# Patient Record
Sex: FEMALE | Race: WHITE | NOT HISPANIC OR LATINO | URBAN - METROPOLITAN AREA
[De-identification: names, ages, dates, MRNs, and addresses within clinical notes are randomized per-mention and may not be internally consistent; named-entity substitution may affect disease eponyms.]

---

## 2017-05-19 ENCOUNTER — EMERGENCY (EMERGENCY)
Facility: HOSPITAL | Age: 62
LOS: 0 days | Discharge: NURSING, REHAB OR LONG-TERM CARE | End: 2017-05-19
Admitting: INTERNAL MEDICINE

## 2017-05-31 ENCOUNTER — INPATIENT (INPATIENT)
Facility: HOSPITAL | Age: 62
LOS: 71 days | Discharge: PSYCHIATRIC OR SUBSTANCE USE FACILITY | End: 2017-08-11
Attending: PSYCHIATRY & NEUROLOGY | Admitting: INTERNAL MEDICINE

## 2017-05-31 DIAGNOSIS — F25.9 SCHIZOAFFECTIVE DISORDER, UNSPECIFIED: ICD-10-CM

## 2017-05-31 DIAGNOSIS — Z72.0 TOBACCO USE: ICD-10-CM

## 2017-05-31 DIAGNOSIS — I10 ESSENTIAL (PRIMARY) HYPERTENSION: ICD-10-CM

## 2017-05-31 DIAGNOSIS — N39.0 URINARY TRACT INFECTION, SITE NOT SPECIFIED: ICD-10-CM

## 2017-07-06 DIAGNOSIS — Z79.51 LONG TERM (CURRENT) USE OF INHALED STEROIDS: ICD-10-CM

## 2017-07-06 DIAGNOSIS — Z79.899 OTHER LONG TERM (CURRENT) DRUG THERAPY: ICD-10-CM

## 2017-07-06 DIAGNOSIS — J44.1 CHRONIC OBSTRUCTIVE PULMONARY DISEASE WITH (ACUTE) EXACERBATION: ICD-10-CM

## 2017-07-06 DIAGNOSIS — R53.1 WEAKNESS: ICD-10-CM

## 2017-07-06 DIAGNOSIS — F17.200 NICOTINE DEPENDENCE, UNSPECIFIED, UNCOMPLICATED: ICD-10-CM

## 2017-08-16 DIAGNOSIS — E78.5 HYPERLIPIDEMIA, UNSPECIFIED: ICD-10-CM

## 2017-08-16 DIAGNOSIS — J44.9 CHRONIC OBSTRUCTIVE PULMONARY DISEASE, UNSPECIFIED: ICD-10-CM

## 2017-08-16 DIAGNOSIS — F20.0 PARANOID SCHIZOPHRENIA: ICD-10-CM

## 2017-08-16 DIAGNOSIS — Z91.14 PATIENT'S OTHER NONCOMPLIANCE WITH MEDICATION REGIMEN: ICD-10-CM

## 2017-08-16 DIAGNOSIS — I10 ESSENTIAL (PRIMARY) HYPERTENSION: ICD-10-CM

## 2017-08-16 DIAGNOSIS — Z88.0 ALLERGY STATUS TO PENICILLIN: ICD-10-CM

## 2017-08-16 DIAGNOSIS — F20.9 SCHIZOPHRENIA, UNSPECIFIED: ICD-10-CM

## 2017-08-16 DIAGNOSIS — M81.0 AGE-RELATED OSTEOPOROSIS WITHOUT CURRENT PATHOLOGICAL FRACTURE: ICD-10-CM

## 2017-08-16 DIAGNOSIS — Z78.1 PHYSICAL RESTRAINT STATUS: ICD-10-CM

## 2017-08-16 DIAGNOSIS — Z63.8 OTHER SPECIFIED PROBLEMS RELATED TO PRIMARY SUPPORT GROUP: ICD-10-CM

## 2017-08-16 SDOH — SOCIAL STABILITY - SOCIAL INSECURITY: OTHER SPECIFIED PROBLEMS RELATED TO PRIMARY SUPPORT GROUP: Z63.8

## 2019-10-11 ENCOUNTER — OUTPATIENT (OUTPATIENT)
Dept: OUTPATIENT SERVICES | Facility: HOSPITAL | Age: 64
LOS: 1 days | Discharge: HOME | End: 2019-10-11

## 2019-10-11 DIAGNOSIS — J44.9 CHRONIC OBSTRUCTIVE PULMONARY DISEASE, UNSPECIFIED: ICD-10-CM

## 2020-07-31 ENCOUNTER — INPATIENT (INPATIENT)
Facility: HOSPITAL | Age: 65
LOS: 1 days | Discharge: PSYCHIATRIC OR SUBSTANCE USE FACILITY | End: 2020-08-02
Attending: HOSPITALIST | Admitting: HOSPITALIST
Payer: MEDICARE

## 2020-07-31 VITALS
DIASTOLIC BLOOD PRESSURE: 62 MMHG | RESPIRATION RATE: 18 BRPM | TEMPERATURE: 99 F | OXYGEN SATURATION: 94 % | HEART RATE: 80 BPM | SYSTOLIC BLOOD PRESSURE: 148 MMHG

## 2020-07-31 DIAGNOSIS — F31.9 BIPOLAR DISORDER, UNSPECIFIED: ICD-10-CM

## 2020-07-31 DIAGNOSIS — F03.90 UNSPECIFIED DEMENTIA, UNSPECIFIED SEVERITY, WITHOUT BEHAVIORAL DISTURBANCE, PSYCHOTIC DISTURBANCE, MOOD DISTURBANCE, AND ANXIETY: ICD-10-CM

## 2020-07-31 LAB
ALBUMIN SERPL ELPH-MCNC: 4.4 G/DL — SIGNIFICANT CHANGE UP (ref 3.5–5.2)
ALP SERPL-CCNC: 75 U/L — SIGNIFICANT CHANGE UP (ref 30–115)
ALT FLD-CCNC: 8 U/L — SIGNIFICANT CHANGE UP (ref 0–41)
ANION GAP SERPL CALC-SCNC: 13 MMOL/L — SIGNIFICANT CHANGE UP (ref 7–14)
APAP SERPL-MCNC: <5 UG/ML — LOW (ref 10–30)
APPEARANCE UR: ABNORMAL
AST SERPL-CCNC: 12 U/L — SIGNIFICANT CHANGE UP (ref 0–41)
BACTERIA # UR AUTO: ABNORMAL
BASOPHILS # BLD AUTO: 0.03 K/UL — SIGNIFICANT CHANGE UP (ref 0–0.2)
BASOPHILS NFR BLD AUTO: 0.4 % — SIGNIFICANT CHANGE UP (ref 0–1)
BILIRUB SERPL-MCNC: 0.6 MG/DL — SIGNIFICANT CHANGE UP (ref 0.2–1.2)
BILIRUB UR-MCNC: NEGATIVE — SIGNIFICANT CHANGE UP
BUN SERPL-MCNC: 12 MG/DL — SIGNIFICANT CHANGE UP (ref 10–20)
CALCIUM SERPL-MCNC: 9.7 MG/DL — SIGNIFICANT CHANGE UP (ref 8.5–10.1)
CHLORIDE SERPL-SCNC: 101 MMOL/L — SIGNIFICANT CHANGE UP (ref 98–110)
CO2 SERPL-SCNC: 29 MMOL/L — SIGNIFICANT CHANGE UP (ref 17–32)
COLOR SPEC: YELLOW — SIGNIFICANT CHANGE UP
CREAT SERPL-MCNC: 0.7 MG/DL — SIGNIFICANT CHANGE UP (ref 0.7–1.5)
DIFF PNL FLD: NEGATIVE — SIGNIFICANT CHANGE UP
EOSINOPHIL # BLD AUTO: 0.13 K/UL — SIGNIFICANT CHANGE UP (ref 0–0.7)
EOSINOPHIL NFR BLD AUTO: 1.5 % — SIGNIFICANT CHANGE UP (ref 0–8)
EPI CELLS # UR: >27 /HPF — HIGH (ref 0–5)
ETHANOL SERPL-MCNC: <10 MG/DL — SIGNIFICANT CHANGE UP
GLUCOSE SERPL-MCNC: 139 MG/DL — HIGH (ref 70–99)
GLUCOSE UR QL: NEGATIVE — SIGNIFICANT CHANGE UP
HCT VFR BLD CALC: 45 % — SIGNIFICANT CHANGE UP (ref 37–47)
HGB BLD-MCNC: 14.2 G/DL — SIGNIFICANT CHANGE UP (ref 12–16)
HYALINE CASTS # UR AUTO: 6 /LPF — SIGNIFICANT CHANGE UP (ref 0–7)
IMM GRANULOCYTES NFR BLD AUTO: 0.4 % — HIGH (ref 0.1–0.3)
KETONES UR-MCNC: ABNORMAL
LEUKOCYTE ESTERASE UR-ACNC: ABNORMAL
LYMPHOCYTES # BLD AUTO: 2.2 K/UL — SIGNIFICANT CHANGE UP (ref 1.2–3.4)
LYMPHOCYTES # BLD AUTO: 25.9 % — SIGNIFICANT CHANGE UP (ref 20.5–51.1)
MCHC RBC-ENTMCNC: 28.1 PG — SIGNIFICANT CHANGE UP (ref 27–31)
MCHC RBC-ENTMCNC: 31.6 G/DL — LOW (ref 32–37)
MCV RBC AUTO: 88.9 FL — SIGNIFICANT CHANGE UP (ref 81–99)
MONOCYTES # BLD AUTO: 0.57 K/UL — SIGNIFICANT CHANGE UP (ref 0.1–0.6)
MONOCYTES NFR BLD AUTO: 6.7 % — SIGNIFICANT CHANGE UP (ref 1.7–9.3)
NEUTROPHILS # BLD AUTO: 5.54 K/UL — SIGNIFICANT CHANGE UP (ref 1.4–6.5)
NEUTROPHILS NFR BLD AUTO: 65.1 % — SIGNIFICANT CHANGE UP (ref 42.2–75.2)
NITRITE UR-MCNC: NEGATIVE — SIGNIFICANT CHANGE UP
NRBC # BLD: 0 /100 WBCS — SIGNIFICANT CHANGE UP (ref 0–0)
PH UR: 6 — SIGNIFICANT CHANGE UP (ref 5–8)
PLATELET # BLD AUTO: 176 K/UL — SIGNIFICANT CHANGE UP (ref 130–400)
POTASSIUM SERPL-MCNC: 4.3 MMOL/L — SIGNIFICANT CHANGE UP (ref 3.5–5)
POTASSIUM SERPL-SCNC: 4.3 MMOL/L — SIGNIFICANT CHANGE UP (ref 3.5–5)
PROT SERPL-MCNC: 6.4 G/DL — SIGNIFICANT CHANGE UP (ref 6–8)
PROT UR-MCNC: ABNORMAL
RBC # BLD: 5.06 M/UL — SIGNIFICANT CHANGE UP (ref 4.2–5.4)
RBC # FLD: 13.9 % — SIGNIFICANT CHANGE UP (ref 11.5–14.5)
RBC CASTS # UR COMP ASSIST: 3 /HPF — SIGNIFICANT CHANGE UP (ref 0–4)
SALICYLATES SERPL-MCNC: <0.3 MG/DL — LOW (ref 4–30)
SARS-COV-2 RNA SPEC QL NAA+PROBE: SIGNIFICANT CHANGE UP
SODIUM SERPL-SCNC: 143 MMOL/L — SIGNIFICANT CHANGE UP (ref 135–146)
SP GR SPEC: 1.02 — SIGNIFICANT CHANGE UP (ref 1.01–1.02)
UROBILINOGEN FLD QL: SIGNIFICANT CHANGE UP
WBC # BLD: 8.5 K/UL — SIGNIFICANT CHANGE UP (ref 4.8–10.8)
WBC # FLD AUTO: 8.5 K/UL — SIGNIFICANT CHANGE UP (ref 4.8–10.8)
WBC UR QL: 238 /HPF — HIGH (ref 0–5)

## 2020-07-31 PROCEDURE — 99285 EMERGENCY DEPT VISIT HI MDM: CPT | Mod: CS

## 2020-07-31 PROCEDURE — 90792 PSYCH DIAG EVAL W/MED SRVCS: CPT

## 2020-07-31 RX ORDER — MIDAZOLAM HYDROCHLORIDE 1 MG/ML
4 INJECTION, SOLUTION INTRAMUSCULAR; INTRAVENOUS ONCE
Refills: 0 | Status: DISCONTINUED | OUTPATIENT
Start: 2020-07-31 | End: 2020-07-31

## 2020-07-31 RX ORDER — HALOPERIDOL DECANOATE 100 MG/ML
2 INJECTION INTRAMUSCULAR ONCE
Refills: 0 | Status: COMPLETED | OUTPATIENT
Start: 2020-07-31 | End: 2020-07-31

## 2020-07-31 RX ORDER — CEFTRIAXONE 500 MG/1
1000 INJECTION, POWDER, FOR SOLUTION INTRAMUSCULAR; INTRAVENOUS ONCE
Refills: 0 | Status: COMPLETED | OUTPATIENT
Start: 2020-07-31 | End: 2020-07-31

## 2020-07-31 RX ORDER — HALOPERIDOL DECANOATE 100 MG/ML
3 INJECTION INTRAMUSCULAR ONCE
Refills: 0 | Status: COMPLETED | OUTPATIENT
Start: 2020-07-31 | End: 2020-07-31

## 2020-07-31 RX ORDER — CEFTRIAXONE 500 MG/1
1000 INJECTION, POWDER, FOR SOLUTION INTRAMUSCULAR; INTRAVENOUS ONCE
Refills: 0 | Status: DISCONTINUED | OUTPATIENT
Start: 2020-07-31 | End: 2020-07-31

## 2020-07-31 RX ORDER — MIDAZOLAM HYDROCHLORIDE 1 MG/ML
5 INJECTION, SOLUTION INTRAMUSCULAR; INTRAVENOUS ONCE
Refills: 0 | Status: DISCONTINUED | OUTPATIENT
Start: 2020-07-31 | End: 2020-07-31

## 2020-07-31 RX ADMIN — HALOPERIDOL DECANOATE 2 MILLIGRAM(S): 100 INJECTION INTRAMUSCULAR at 16:59

## 2020-07-31 RX ADMIN — Medication 1 MILLIGRAM(S): at 16:59

## 2020-07-31 RX ADMIN — MIDAZOLAM HYDROCHLORIDE 4 MILLIGRAM(S): 1 INJECTION, SOLUTION INTRAMUSCULAR; INTRAVENOUS at 20:43

## 2020-07-31 RX ADMIN — HALOPERIDOL DECANOATE 3 MILLIGRAM(S): 100 INJECTION INTRAMUSCULAR at 18:25

## 2020-07-31 RX ADMIN — CEFTRIAXONE 1000 MILLIGRAM(S): 500 INJECTION, POWDER, FOR SOLUTION INTRAMUSCULAR; INTRAVENOUS at 22:52

## 2020-07-31 NOTE — ED PROVIDER NOTE - PROGRESS NOTE DETAILS
Assess by Psych attending. Recommends giving patient Haldol 2 and ativan 1 for sedation. Will need to sedate patient for IV and blood work. -DC Attending Note:   64 yo F PMH dementia and schizophrenia, came from NH for erratic behavior for psych eval. On exam: Pt told resident that she thought we were all members of the [employer], demonstrating disorganized behavior. Psych consulted stat. Was able to be eval pt prior to sedating her. Pt now still refusing labs but will provide sedation with Ativan and Haldol and reassess. Patient will be admitted to IPP if cleared medically; persistently agitated despite sedative medications. Was refusing blood work, urine throughout stay. Pending urinalysis and dispo. Signed out to Dr. Williamson. [place]

## 2020-07-31 NOTE — ED BEHAVIORAL HEALTH ASSESSMENT NOTE - SUMMARY
65 year old female with history of bipolar sent from nursing home due to decompensated psychiatric symptoms, secondary to medication non-compliance. Notable disorganized behavior and underlying psychosis impairing patient's judgment and behavior. Although dementia predisposed patient to become agitated but current presentation is mostly related to decompensated symptoms secondary to medication non-compliance.   Once medically cleared, r/o delirium Patient is in need of inpatient psych admission for stabilization

## 2020-07-31 NOTE — CHART NOTE - NSCHARTNOTEFT_GEN_A_CORE
Pt has home medication of PO Invega XR 6mg BID. This is a non-formulary medication and will require a non-formulary form when the pt is on the inpatient unit.
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
## 2020-07-31 NOTE — ED BEHAVIORAL HEALTH ASSESSMENT NOTE - DESCRIPTION
See above. none known  from her  more than 30 years ago. She has 2 daughters. Patient has a court appointed legal guardian.

## 2020-07-31 NOTE — ED ADULT NURSE NOTE - NSFALLRSKASSESSTYPE_ED_ALL_ED
Procedures Ordered This Visit    CBC auto differential           Comprehensive metabolic panel           Ferritin           Iron and TIBC           Lipid panel           Magnesium           TSH           Vitamin B1           Vitamin B12           Vitamin D               Initial (On Arrival)

## 2020-07-31 NOTE — ED BEHAVIORAL HEALTH ASSESSMENT NOTE - OTHER PAST PSYCHIATRIC HISTORY (INCLUDE DETAILS REGARDING ONSET, COURSE OF ILLNESS, INPATIENT/OUTPATIENT TREATMENT)
Patient has psychiatric history diagnosed with Bipolar since age 24, has been in an out of the hospital. Was at [facility] for almost 3 years, until November of 2020. REcently discharged from [facility] and currently livign at [facility].

## 2020-07-31 NOTE — ED PROVIDER NOTE - OBJECTIVE STATEMENT
Patient is a 66 yo F w/ hx of paranoid schizophrenia, COPD, HTN, anemia, (possible hx of DVT s/p IVC filter- unsure if on AC per daughter) BIBEMS from [facility] for AMS. Per SNF staff, patient was more somnolent than usual today but otherwise was at baseline health. Patient has been noncompliant with all meds x few weeks including IM and PO haldol. Staff denies cough, vomiting, diarrhea. Patient denies all symptoms bedside but is unreliable. Patient refusing to answer questions; refuses blood work, is agitated. Talks of her 17 year old boyfriend; believes the system is trying to kill her and overtake her.

## 2020-07-31 NOTE — ED ADULT NURSE NOTE - CHIEF COMPLAINT QUOTE
Pt sent in for psych evaluation. Denies suicidal/homicidal ideations. Pt has been refusing all medications

## 2020-07-31 NOTE — ED BEHAVIORAL HEALTH ASSESSMENT NOTE - CURRENT MEDICATION
On haloperidol 1mg po three times a day. Haldol 50mg IM was due on 7/15/20, but patient refused. Patient also on Invega 6mg XR q12hrs, she has been refusing.

## 2020-07-31 NOTE — ED PROVIDER NOTE - CARE PLAN
Principal Discharge DX:	Altered mental status  Secondary Diagnosis:	Agitation  Secondary Diagnosis:	UTI (urinary tract infection)

## 2020-07-31 NOTE — ED PROVIDER NOTE - PHYSICAL EXAMINATION
CONSTITUTIONAL: Well-developed; well-nourished; in no acute distress.   SKIN: warm, dry.  HEAD: Normocephalic; atraumatic.  EYES: PERRL, EOMI, no conjunctival erythema  ENT: No nasal discharge; airway clear.  NECK: Supple; non tender.  CARD: S1, S2 normal; no murmurs, gallops, or rubs. Regular rate and rhythm.   RESP: No wheezes, rales or rhonchi.  ABD: soft ntnd.  EXT: Normal ROM.  No clubbing, cyanosis or edema.   NEURO: Alert, oriented x 4, grossly unremarkable.  PSYCH: Uncooperative; agitated. Punching/kicking. Patient appears disheveled.
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
## 2020-08-01 VITALS — RESPIRATION RATE: 18 BRPM | SYSTOLIC BLOOD PRESSURE: 124 MMHG | HEART RATE: 75 BPM | DIASTOLIC BLOOD PRESSURE: 56 MMHG

## 2020-08-01 DIAGNOSIS — F31.9 BIPOLAR DISORDER, UNSPECIFIED: ICD-10-CM

## 2020-08-01 DIAGNOSIS — F03.90 UNSPECIFIED DEMENTIA, UNSPECIFIED SEVERITY, WITHOUT BEHAVIORAL DISTURBANCE, PSYCHOTIC DISTURBANCE, MOOD DISTURBANCE, AND ANXIETY: ICD-10-CM

## 2020-08-01 LAB
AMPHET UR-MCNC: NEGATIVE — SIGNIFICANT CHANGE UP
BARBITURATES UR SCN-MCNC: NEGATIVE — SIGNIFICANT CHANGE UP
BENZODIAZ UR-MCNC: POSITIVE
COCAINE METAB.OTHER UR-MCNC: NEGATIVE — SIGNIFICANT CHANGE UP
DRUG SCREEN 1, URINE RESULT: SIGNIFICANT CHANGE UP
METHADONE UR-MCNC: NEGATIVE — SIGNIFICANT CHANGE UP
OPIATES UR-MCNC: NEGATIVE — SIGNIFICANT CHANGE UP
PCP UR-MCNC: NEGATIVE — SIGNIFICANT CHANGE UP
PROPOXYPHENE QUALITATIVE URINE RESULT: NEGATIVE — SIGNIFICANT CHANGE UP
THC UR QL: NEGATIVE — SIGNIFICANT CHANGE UP

## 2020-08-01 PROCEDURE — 99222 1ST HOSP IP/OBS MODERATE 55: CPT | Mod: AI

## 2020-08-01 PROCEDURE — 99239 HOSP IP/OBS DSCHRG MGMT >30: CPT | Mod: 25

## 2020-08-01 RX ORDER — ENOXAPARIN SODIUM 100 MG/ML
40 INJECTION SUBCUTANEOUS AT BEDTIME
Refills: 0 | Status: DISCONTINUED | OUTPATIENT
Start: 2020-08-01 | End: 2020-08-02

## 2020-08-01 RX ORDER — HALOPERIDOL DECANOATE 100 MG/ML
2 INJECTION INTRAMUSCULAR
Refills: 0 | Status: DISCONTINUED | OUTPATIENT
Start: 2020-08-01 | End: 2020-08-01

## 2020-08-01 RX ORDER — CEFPODOXIME PROXETIL 100 MG
1 TABLET ORAL
Qty: 6 | Refills: 0
Start: 2020-08-01 | End: 2020-08-03

## 2020-08-01 RX ORDER — CEFTRIAXONE 500 MG/1
1000 INJECTION, POWDER, FOR SOLUTION INTRAMUSCULAR; INTRAVENOUS EVERY 24 HOURS
Refills: 0 | Status: DISCONTINUED | OUTPATIENT
Start: 2020-08-01 | End: 2020-08-02

## 2020-08-01 RX ORDER — PALIPERIDONE 1.5 MG/1
6 TABLET, EXTENDED RELEASE ORAL
Refills: 0 | Status: DISCONTINUED | OUTPATIENT
Start: 2020-08-01 | End: 2020-08-02

## 2020-08-01 RX ORDER — HALOPERIDOL DECANOATE 100 MG/ML
2 INJECTION INTRAMUSCULAR EVERY 8 HOURS
Refills: 0 | Status: DISCONTINUED | OUTPATIENT
Start: 2020-08-01 | End: 2020-08-02
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
## 2020-08-01 NOTE — DISCHARGE NOTE PROVIDER - CARE PROVIDER_API CALL
Inpatient Psychiatric Facility,   Please follow up with psychiatrist and PCP at inpatient psychiatric facility  Phone: (   )    -  Fax: (   )    -  Follow Up Time: 1-3 days

## 2020-08-01 NOTE — H&P ADULT - NSHPPHYSICALEXAM_GEN_ALL_CORE
pt is very sleepy, not answering my questions, couldn't do full exam  CONSTITUTIONAL: No acute distress, well-developed,  HEAD:   EYES:   ENT:   PULMONARY: Clear to auscultation bilaterally; no wheezes, rales, or rhonchi  CARDIOVASCULAR: Regular rate and rhythm; no murmurs, rubs, or gallops  GASTROINTESTINAL:  non-distended;   MUSCULOSKELETAL:   NEUROLOGY: very sleepy, non-focal  SKIN:

## 2020-08-01 NOTE — PROGRESS NOTE BEHAVIORAL HEALTH - RISK ASSESSMENT
patient at risk for acute psychosis and inability to participate in safety planning. This is mitigated by housing stability, lack of suicidality, suicide attempts and location in controlled environment.

## 2020-08-01 NOTE — PROGRESS NOTE BEHAVIORAL HEALTH - NSBHCHARTREVIEWVS_PSY_A_CORE FT
Vital Signs Last 24 Hrs  T(C): --  T(F): --  HR: 75 (01 Aug 2020 06:23) (75 - 75)  BP: 124/56 (01 Aug 2020 06:23) (124/56 - 124/56)  BP(mean): --  RR: 18 (01 Aug 2020 06:23) (18 - 18)  SpO2: --

## 2020-08-01 NOTE — DISCHARGE NOTE NURSING/CASE MANAGEMENT/SOCIAL WORK - PATIENT PORTAL LINK FT
You can access the FollowMyHealth Patient Portal offered by [facility] by registering at the following website: [email or web address]. By joining [facility]’s FollowMyHealth portal, you will also be able to view your health information using other applications (apps) compatible with our system.

## 2020-08-01 NOTE — DISCHARGE NOTE PROVIDER - HOSPITAL COURSE
64 yo F w/ hx of schizophrenia, COPD, HTN, GERD, questionable DVT with IVC was brought from NH due to alerted mental status.    Per SNF staff, today the patient was noted to be somnolent with abnormal behavioral according , and she has been refusing VS checking meds and blood work, pt is sleeping and refusing to answer questions. Pt was treated few weeks ago for COVID.        Per ED, refuses blood draw and VS, was sedated with Ativan and Haldol. she said that she believes the system is trying to kill her and overtake her, she also said to ED team they are all members of the [facility]. Labs showed +ve UA, normal WBC, normal VS, psych consulted, assessed as decompensated psychiatric symptoms, secondary to medication non-compliance, asked to have medical clearance and treatment of UTI. Pt is refusing tests, IV placement, however  is medically stable, can treat UTI with PO abx. Pt will be transferred to inpatient psych facility.

## 2020-08-01 NOTE — H&P ADULT - ASSESSMENT
64 yo F w/ hx of schizophrenia, COPD, HTN, GERD, questionable DVT with IVC was brought from NH due to alerted mental status, agitation, delusions and hallucinations       #schizophrenia with decompensated psychiatric symptoms 2/2 to medications non compliance and presumed UTI  - Needs Inpt psych admission once treated for presumed UTI  - Rocephin, f/u Ucx  - f/u with psych    #GERD  - PPI    #copd:  - stable on RA, not on any other meds    #HTN  - BP controlled for now, no home meds    #Diet: DASH  #DVT pro: Lovenox  #GI pro:  PPI  #Dispo: from NH, needs inpt pschy  #Code status : full code 64 yo F w/ hx of schizophrenia, COPD, HTN, GERD, questionable DVT with IVC was brought from NH due to alerted mental status, agitation, delusions and hallucinations       #schizophrenia with decompensated psychiatric symptoms 2/2 to medications non compliance and presumed UTI  - Needs Inpt psych admission once treated for presumed UTI  - Rocephin, f/u Ucx (in chartn pt is allergic to PCN with no detail of kind of reaction, no contact to reach to family to confirm, she got Rocephin in ED with no reaction, c/w Rocephin for now)  - f/u with psych  - Invega 6mg BID  - Haldol PRN for agitation  - Check EKG for QTc    #GERD  - PPI    #copd:  - stable on RA, not on any other meds    #HTN  - BP controlled for now, no home meds    #Diet: DASH  #DVT pro: Lovenox  #GI pro:  PPI  #Dispo: from NH, needs inpt pschy  #Code status : full code 64 yo F w/ hx of schizophrenia, COPD, HTN, GERD, questionable DVT with IVC was brought from NH due to alerted mental status, agitation, delusions and hallucinations       #schizophrenia with decompensated psychiatric symptoms 2/2 to medications non compliance and presumed UTI  - Needs Inpt psych admission once treated for presumed UTI  - Rocephin, f/u Ucx (in chartn pt is allergic to PCN with no detail of kind of reaction, no contact to reach to family to confirm, she got Rocephin in ED with no reaction, c/w Rocephin for now)  - f/u with psych  - Invega 6mg BID  - Haldol or Ativan inkection PRN for agitation  - Check EKG for QTc    #GERD  - PPI    #copd:  - stable on RA, not on any other meds    #HTN  - BP controlled for now, no home meds    #Diet: DASH  #DVT pro: Lovenox  #GI pro:  PPI  #Dispo: from NH, needs inpt pschy  #Code status : full code 64 yo F w/ hx of schizophrenia, COPD, HTN, GERD, questionable DVT with IVC was brought from NH due to alerted mental status, agitation, delusions and hallucinations     # ? UTI  U/A is lg leuks, but few bact  pt denies symptoms  abx: vantin 200mg po q12 x3 days and stop (PCN all, but tolerating rocpehin)  no need to wait on cultures - afeb, no WBC, fully awake and alert    # schizophrenia with decompensated psychiatric symptoms 2/2 to medications non compliance   pt refused physical exam by me  Needs Inpt psych admission once treated for presumed UTI  f/u with psych  Invega 6mg BID  Haldol 2mg IM q8 PRN for agitation    # GERD  PPI    # copd:  stable on RA, not on any other meds    # HTN  BP controlled for now, no home meds    # DVT pro: Lovenox    # GI pro:  PPI    # Code status : full code    # Dispo: medically stable for d/c; short course abx for u/a; f/u w/ psych for IPP admit today

## 2020-08-01 NOTE — H&P ADULT - NSHPLABSRESULTS_GEN_ALL_CORE
LABS:                        14.2   8.50  )-----------( 176      ( 31 Jul 2020 18:59 )             45.0     31 Jul 2020 18:59    143    |  101    |  12     ----------------------------<  139    4.3     |  29     |  0.7      Ca    9.7        31 Jul 2020 18:59    TPro  6.4    /  Alb  4.4    /  TBili  0.6    /  DBili  x      /  AST  12     /  ALT  8      /  AlkPhos  75     31 Jul 2020 18:59

## 2020-08-01 NOTE — H&P ADULT - HISTORY OF PRESENT ILLNESS
64 yo F w/ hx of schizophrenia, COPD, HTN, GERD, questionable DVT with IVC was brought from NH due to alerted mental status.  Per SNF staff, today the patient was noted to be somnolent with abnormal behavioral according , and she has been refusing VS checking meds and blood work, ,     pt is sleeping and refusing to answer questions   according to NH staff, no fever, chills, cough, SOB, diarrhea, dysuria, frequency, urgency, abd pain,   pt was treated few weeks ago for COVID,     per ED, refuses blood draw and VS, was sedated with Ativan and Haldol.  she said that she believes the system is trying to kill her and overtake her, she also said to ED team they are all members of the [facility].  Labs showed +ve UA, normal WBC, normal VS, psych consulted, assessed as decompensated psychiatric symptoms, secondary to medication non-compliance,  asked to have medical clearance and treatment of UTI,  once treated fot UTI will take her to inpatient psych admission 66 yo F w/ hx of schizophrenia, COPD, HTN, GERD, questionable DVT with IVC was brought from NH due to alerted mental status.  Per SNF staff, today the patient was noted to be somnolent with abnormal behavioral according , and she has been refusing VS checking meds and blood work, ,     pt is sleeping and refusing to answer questions   according to NH staff, no fever, chills, cough, SOB, diarrhea, dysuria, frequency, urgency, abd pain,   pt was treated few weeks ago for COVID,     per ED, refuses blood draw and VS, was sedated with Ativan and Haldol.  she said that she believes the system is trying to kill her and overtake her, she also said to ED team they are all members of the [facility].  Labs showed +ve UA, normal WBC, normal VS, psych consulted, assessed as decompensated psychiatric symptoms, secondary to medication non-compliance,  asked to have medical clearance and treatment of UTI,  once treated fot UTI will take her to inpatient psych admission    Attd: pt says she is fine and does not need an exam; says she is eating well; I saw her walking fine; NH sent her in for abnl behavior and psych said she'll take to IPP; she is on 1:1 sit on floor

## 2020-08-01 NOTE — PROGRESS NOTE BEHAVIORAL HEALTH - SUMMARY
65 year old female with history of bipolar sent from nursing home due to decompensated psychiatric symptoms, secondary to medication non-compliance. Upon initial evaluation patient demonstrated grossly disorganized behavior and underlying psychosis impairing patient's judgment and behavior. Patient admitted for   Upon re-evaluation patient continues to demonstrate grossly disorganized behavior with continued delusions, impaired judgement and insight. Given presentation, condition likely symptoms secondary to continued medication non-compliance. Currently patient of danger to herself as she is unable to care for herself. Patient is in need of inpatient psych admission for stabilization.     Plan:   # Acute aggitation  For acute agitation not responsive to verbal redirection may give haldol 2mg q8 prn with escalation to IM if patient becomes a danger to him/herself/others or property.   - Admission to inpatient psychiatry

## 2020-08-01 NOTE — DISCHARGE NOTE PROVIDER - PROVIDER TOKENS
FREE:[LAST:[Inpatient Psychiatric Facility],PHONE:[(   )    -],FAX:[(   )    -],ADDRESS:[Please follow up with psychiatrist and PCP at inpatient psychiatric facility],FOLLOWUP:[1-3 days]]

## 2020-08-01 NOTE — PROGRESS NOTE BEHAVIORAL HEALTH - NSBHCHARTREVIEWLAB_PSY_A_CORE FT
14.2   8.50  )-----------( 176      ( 31 Jul 2020 18:59 )             45.0     07-31    143  |  101  |  12  ----------------------------<  139<H>  4.3   |  29  |  0.7    Ca    9.7      31 Jul 2020 18:59    TPro  6.4  /  Alb  4.4  /  TBili  0.6  /  DBili  x   /  AST  12  /  ALT  8   /  AlkPhos  75  07-31

## 2020-08-01 NOTE — DISCHARGE NOTE PROVIDER - NSDCCPCAREPLAN_GEN_ALL_CORE_FT
PRINCIPAL DISCHARGE DIAGNOSIS  Diagnosis: Acute exacerbation of chronic schizophrenia  Assessment and Plan of Treatment: You were brought in for evaluation of your change in behavior. Psychiatry was consulted. You were found to have Schizophrenia with decompensated psychiatric symptoms due to medcompliance vs UTI. Your UTI is being treated and now you will be transferred to inpatient psychiatric facility.      SECONDARY DISCHARGE DIAGNOSES  Diagnosis: UTI (urinary tract infection)  Assessment and Plan of Treatment: You have bacteria in your Urine. You have been treated with IV antibiotics. You will be discharged with Vantin 200mg PO twice a day for 3 days. Please follow up with your Primary Care provider within 1 week.

## 2020-08-01 NOTE — DISCHARGE NOTE PROVIDER - NSDCMRMEDTOKEN_GEN_ALL_CORE_FT
Haldol 1 mg oral tablet: 1 tab(s) orally 3 times a day  Haldol Decanoate 50 mg/mL intramuscular solution: 0.5 milliliter(s) intramuscular once a month  Invega 6 mg oral tablet, extended release: 1 tab(s) orally 2 times a day  LORazepam 2 mg/mL injectable solution: 2 milligram(s) injectable every 8 hours, As Needed - for agitation  pantoprazole 40 mg oral delayed release tablet: 1 tab(s) orally once a day

## 2020-08-02 ENCOUNTER — INPATIENT (INPATIENT)
Facility: HOSPITAL | Age: 65
LOS: 122 days | Discharge: PSYCHIATRIC OR SUBSTANCE USE FACILITY | End: 2020-12-03
Attending: PSYCHIATRY & NEUROLOGY | Admitting: PSYCHIATRY & NEUROLOGY
Payer: MEDICARE

## 2020-08-02 VITALS
SYSTOLIC BLOOD PRESSURE: 150 MMHG | HEART RATE: 96 BPM | HEIGHT: 62 IN | WEIGHT: 151.02 LBS | DIASTOLIC BLOOD PRESSURE: 82 MMHG

## 2020-08-02 DIAGNOSIS — F20.9 SCHIZOPHRENIA, UNSPECIFIED: ICD-10-CM

## 2020-08-02 DIAGNOSIS — K21.9 GASTRO-ESOPHAGEAL REFLUX DISEASE WITHOUT ESOPHAGITIS: ICD-10-CM

## 2020-08-02 DIAGNOSIS — J44.9 CHRONIC OBSTRUCTIVE PULMONARY DISEASE, UNSPECIFIED: ICD-10-CM

## 2020-08-02 PROCEDURE — 99231 SBSQ HOSP IP/OBS SF/LOW 25: CPT

## 2020-08-02 RX ORDER — ACETAMINOPHEN 500 MG
650 TABLET ORAL EVERY 4 HOURS
Refills: 0 | Status: DISCONTINUED | OUTPATIENT
Start: 2020-08-02 | End: 2020-09-01

## 2020-08-02 RX ORDER — PANTOPRAZOLE SODIUM 20 MG/1
40 TABLET, DELAYED RELEASE ORAL
Refills: 0 | Status: DISCONTINUED | OUTPATIENT
Start: 2020-08-02 | End: 2020-09-01

## 2020-08-02 RX ORDER — HYDROXYZINE HCL 10 MG
25 TABLET ORAL EVERY 12 HOURS
Refills: 0 | Status: DISCONTINUED | OUTPATIENT
Start: 2020-08-02 | End: 2020-08-18

## 2020-08-02 RX ORDER — HALOPERIDOL DECANOATE 100 MG/ML
5 INJECTION INTRAMUSCULAR EVERY 12 HOURS
Refills: 0 | Status: DISCONTINUED | OUTPATIENT
Start: 2020-08-02 | End: 2020-08-03

## 2020-08-02 NOTE — PATIENT PROFILE BEHAVIORAL HEALTH - AS SC BRADEN SENSORY
How Severe Is Your Rash?: moderate
Is This A New Presentation, Or A Follow-Up?: Rash
(4) no impairment

## 2020-08-02 NOTE — H&P ADULT - ASSESSMENT
64 yo F w/ hx of schizophrenia, COPD, HTN, GERD, questionable DVT with IVC was brought from NH due to alerted mental status.  Per SNF staff, today the patient was noted to be somnolent with abnormal behavioral according staff. Pt  here with a c/o abnormal behavior and depression.

## 2020-08-02 NOTE — H&P ADULT - NSHPLABSRESULTS_GEN_ALL_CORE
14.2   8.50  )-----------( 176      ( 2020 18:59 )             45.0       07-31    143  |  101  |  12  ----------------------------<  139<H>  4.3   |  29  |  0.7    Ca    9.7      2020 18:59    TPro  6.4  /  Alb  4.4  /  TBili  0.6  /  DBili  x   /  AST  12  /  ALT  8   /  AlkPhos  75  07-              Urinalysis Basic - ( 2020 21:00 )    Color: Yellow / Appearance: Slightly Turbid / S.024 / pH: x  Gluc: x / Ketone: Small  / Bili: Negative / Urobili: <2 mg/dL   Blood: x / Protein: 30 mg/dL / Nitrite: Negative   Leuk Esterase: Large / RBC: 3 /HPF /  /HPF   Sq Epi: x / Non Sq Epi: >27 /HPF / Bacteria: Few            Lactate Trend            CAPILLARY BLOOD GLUCOSE

## 2020-08-02 NOTE — H&P ADULT - HISTORY OF PRESENT ILLNESS
64 yo F w/ hx of schizophrenia, COPD, HTN, GERD, questionable DVT with IVC was brought from NH due to alerted mental status.  Per SNF staff, today the patient was noted to be somnolent with abnormal behavioral . Pt here with a c/o abnormal behavior and worsening depression.

## 2020-08-02 NOTE — PROGRESS NOTE BEHAVIORAL HEALTH - NSBHFUPINTERVALHXFT_PSY_A_CORE
Chart reviewed, Nursing report discussed, pt was assessed in person. Pt was admitted from medical floor to [facility]. Earlier she was sent from her residence for changed mental status, withdrawn behaviors, not taking medications for over a month. Her last Haldol decanoate was due July 15. She also refused Invega.    Pt was laying in bed refusing interview or communicate. No aggressive behaviors. She ate and stayed in her bed. 1:1 observation initiated.    PRN Haldol 5mg, Benadryl 50mg, Ativan 2mg po for agitation ordered.

## 2020-08-02 NOTE — H&P ADULT - NSHPPHYSICALEXAM_GEN_ALL_CORE
GENERAL:  64y/o Female NAD, resting comfortably.  HEAD:  Atraumatic, Normocephalic  EYES: EOMI, PERRLA, conjunctiva and sclera clear  NECK: Supple, No JVD, no cervical lymphadenopathy, non-tender  CHEST/LUNG: Clear to auscultation bilaterally; No wheeze, rhonchi, or rales  HEART: Regular rate and rhythm; S1&S2  ABDOMEN: Soft, Nontender, Nondistended x 4 quadrants; Bowel sounds present  EXTREMITIES:   Peripheral Pulses Present, No clubbing, no cyanosis, or no edema, no calf tenderness  PSYCH: AAOx3, cooperative, appropriate  NEUROLOGY: WNL  SKIN: WNL

## 2020-08-03 DIAGNOSIS — F17.200 NICOTINE DEPENDENCE, UNSPECIFIED, UNCOMPLICATED: ICD-10-CM

## 2020-08-03 PROCEDURE — 99232 SBSQ HOSP IP/OBS MODERATE 35: CPT

## 2020-08-03 RX ORDER — HALOPERIDOL DECANOATE 100 MG/ML
1 INJECTION INTRAMUSCULAR
Refills: 0 | Status: DISCONTINUED | OUTPATIENT
Start: 2020-08-03 | End: 2020-08-21

## 2020-08-03 RX ORDER — HALOPERIDOL DECANOATE 100 MG/ML
5 INJECTION INTRAMUSCULAR EVERY 12 HOURS
Refills: 0 | Status: DISCONTINUED | OUTPATIENT
Start: 2020-08-03 | End: 2020-08-03

## 2020-08-03 RX ORDER — NICOTINE POLACRILEX 2 MG
2 GUM BUCCAL
Refills: 0 | Status: DISCONTINUED | OUTPATIENT
Start: 2020-08-03 | End: 2020-09-02

## 2020-08-03 RX ORDER — HALOPERIDOL DECANOATE 100 MG/ML
2 INJECTION INTRAMUSCULAR EVERY 8 HOURS
Refills: 0 | Status: DISCONTINUED | OUTPATIENT
Start: 2020-08-03 | End: 2020-08-21

## 2020-08-03 NOTE — CHART NOTE - NSCHARTNOTEFT_GEN_A_CORE
Called in by the Psychiatry resident   She found that Patient was discharged with a UA positive and has not received any antibiotics.   Patient is afebrile, no leukocytosis, UA from /31st were positive for Leukocytes but without nitrites and a few bacteria and many epithelial cells, likely contaminated   Will get another UA and Urine culture   Will hold on giving antibiotics for now

## 2020-08-03 NOTE — PROGRESS NOTE BEHAVIORAL HEALTH - NSBHCHARTREVIEWLAB_PSY_A_CORE FT
Complete Blood Count + Automated Diff (07.31.20 @ 18:59)    WBC Count: 8.50 K/uL    RBC Count: 5.06 M/uL    Hemoglobin: 14.2 g/dL    Hematocrit: 45.0 %    Mean Cell Volume: 88.9 fL    Mean Cell Hemoglobin: 28.1 pg    Mean Cell Hemoglobin Conc: 31.6 g/dL    Red Cell Distrib Width: 13.9     Platelet Count - Automated: 176 K/uL    Auto Neutrophil #: 5.54 K/uL    Auto Lymphocyte #: 2.20 K/uL    Auto Monocyte #: 0.57 K/uL    Auto Eosinophil #: 0.13 K/uL    Auto Basophil #: 0.03 K/uL    Auto Neutrophil %: 65.1    Auto Lymphocyte %: 25.9     Auto Monocyte %: 6.7     Auto Eosinophil %: 1.5     Auto Basophil %: 0.4     Auto Immature Granulocyte %: 0.4     Nucleated RBC: 0 /100 WBCs  Comprehensive Metabolic Panel (07.31.20 @ 18:59)    Sodium, Serum: 143 mmol/L    Potassium, Serum: 4.3 mmol/L    Chloride, Serum: 101 mmol/L    Carbon Dioxide, Serum: 29 mmol/L    Anion Gap, Serum: 13 mmol/L    Blood Urea Nitrogen, Serum: 12 mg/dL    Creatinine, Serum: 0.7 mg/dL    Glucose, Serum: 139 mg/dL    Calcium, Total Serum: 9.7 mg/dL    Protein Total, Serum: 6.4 g/dL    Albumin, Serum: 4.4 g/dL    Bilirubin Total, Serum: 0.6 mg/dL    Alkaline Phosphatase, Serum: 75 U/L    Aspartate Aminotransferase (AST/SGOT): 12 U/L    Alanine Aminotransferase (ALT/SGPT): 8 U/L    eGFR if Non : 90:

## 2020-08-03 NOTE — PROGRESS NOTE BEHAVIORAL HEALTH - SUMMARY
63 year old female , domiciled in nursing home, with history of COPD, DVT, bipolar, and dementia, multiple prior inpatient psych, was at [place] for 2 years, recently discharged from inpatient psych [facility] transferred to [facility], sent to the ER for continuous medication non-compliant for the past months, worsening psychosis and agitation.    Patient presents with decompensated psychiatric symptoms, secondary to medication non-compliance. Disorganized behavior noted and underlying psychosis appears to be impairing patient's judgment and behavior. Although dementia predisposed patient to become agitated but current presentation is mostly related to decompensated symptoms secondary to medication non-compliance. Patient appears to be a danger to herself at this time due to patient unable to care for herself at this time. Patient would benefit form IPP for safety and stabilization.    #Admit 2PC (9:27)    Plan:    # Schizophrenia  -Haldol 1mg BID    #Tobacco Use Disorder  -Nicotine Gum PRN    -Haldol 2mg Q8 PRN for Severe agitation  -Lorazepam 1mg Q12 PRN for agitation/anxiety  -Hydroxyzine 25mg Q12 PRN for anxiety    -Tylenol PRN for pain  -Pantoprazole for GERD  -Maalox PRN for dyspepsia

## 2020-08-03 NOTE — PROGRESS NOTE BEHAVIORAL HEALTH - NSBHFUPINTERVALHXFT_PSY_A_CORE
Pt seen and evaluated, chart reviewed. As per nursing report patient transferred from [facility], refusing all meds and vitals. Patient alert and oriented to person and place. Patient calm and attempted to be cooperative. No agitation or aggression noted. Impaired concentration, had to be redirected multiple times. Patient presents with paranoid delusions. States she was brought her from [facility] because the facility is closing. [facility] chinese placed her there. Patient states she has 2 daughters but one in really a machine. Patient states she sleeps well and appetite is good. When asked about depression she sates " its situational" Would not elaborate but denies depression and this time. Denies anxiety. Denies suicidal/homicidal ideations. Denies A/V hallucinations. When asked about allergies states " I rather not answer". Then later admits to an allergy to PCN. Interview cut short due to patients inability to concentrate, paranoia.     Collaterals: Nurse Mary Meyers [phone], [facility]. Patient currently prescribed haloperidol 1mg po three times a day. Haldol 25mg IM was due on 7/15/20, but patient refused, Invega 6mg q12hrs. [facility] patient has not taken any medication in over a month.    Collaterals: Hannah Frazier [phone] patients daughter. Patients daughter states patient diagnosed with Schizophrenia vs Bipolar. [facility] she has been at [facility] since May 2020 and has not been taking medication in over a month. [facility] patient was supposed to be getting a monthly injection and was not aware until recently that patient has been refusing all meds. [facility] she spent a few years in [place] prior and has been on multiple medications.    Patients transferred from [facility], UTI. Patient was discharged with a UA positive and has not received any antibiotics. Writer contacted PA and reviewed PA note:  Patient is afebrile, no leukocytosis, UA from /31st were positive for Leukocytes but without nitrites and a few bacteria and many epithelial cells, likely contaminated   Will get another UA and Urine culture   Will hold on giving antibiotics for now

## 2020-08-03 NOTE — CONSULT NOTE ADULT - SUBJECTIVE AND OBJECTIVE BOX
GAGANDEEP MORRIS  65y  Female      Patient is a 65y old  Female who presents with a chief complaint of depression (02 Aug 2020 02:03)    HPI:  66 yo F w/ hx of schizophrenia, COPD, HTN, GERD, questionable DVT with IVC was brought from NH due to alerted mental status.  Per SNF staff, today the patient was noted to be somnolent with abnormal behavioral . Pt here with a c/o abnormal behavior and worsening depression. (02 Aug 2020 02:03)    INTERVAL HPI/OVERNIGHT EVENTS:  HEALTH ISSUES - PROBLEM Dx:  Chronic obstructive lung disease: Chronic obstructive lung disease  Chronic GERD: Chronic GERD  Schizophrenia: Schizophrenia    known from previos admssion in past    PAST MEDICAL & SURGICAL HISTORY:  Chronic GERD  Chronic obstructive lung disease  Schizophrenia    FAMILY HISTORY:    acetaminophen   Tablet .. 650 milliGRAM(s) Oral every 4 hours PRN  aluminum hydroxide/magnesium hydroxide/simethicone Suspension 30 milliLiter(s) Oral every 4 hours PRN  haloperidol     Tablet 5 milliGRAM(s) Oral every 12 hours PRN  hydrOXYzine hydrochloride 25 milliGRAM(s) Oral every 12 hours PRN  LORazepam     Tablet 1 milliGRAM(s) Oral every 12 hours PRN  pantoprazole    Tablet 40 milliGRAM(s) Oral before breakfast      REVIEW OF SYSTEMS:  CONSTITUTIONAL: No fever, weight loss, or fatigue  EYES: No eye pain, visual disturbances, or discharge  ENMT:  No difficulty hearing, tinnitus, vertigo; No sinus or throat pain  NECK: No pain or stiffness  BREASTS: No pain, masses, or nipple discharge  RESPIRATORY: No cough, wheezing, chills or hemoptysis; No shortness of breath  CARDIOVASCULAR: No chest pain, palpitations, dizziness, or leg swelling  GASTROINTESTINAL: No abdominal or epigastric pain. No nausea, vomiting, or hematemesis; No diarrhea or constipation. No melena or hematochezia.  GENITOURINARY: No dysuria, frequency, hematuria, or incontinence  NEUROLOGICAL: No headaches, memory loss, loss of strength, numbness, or tremors  SKIN: No itching, burning, rashes, or lesions   LYMPH NODES: No enlarged glands  ENDOCRINE: No heat or cold intolerance; No hair loss  MUSCULOSKELETAL: No joint pain or swelling; No muscle, back, or extremity pain  PSYCHIATRIC: as per hpi and previous psych history  HEME/LYMPH: No easy bruising, or bleeding gums  ALLERY AND IMMUNOLOGIC: No hives or eczema    T(C): --  HR: --  BP: --  RR: --  SpO2: --  Wt(kg): --Vital Signs Last 24 Hrs  T(C): --  T(F): --  HR: --  BP: --  BP(mean): --  RR: --  SpO2: --    PHYSICAL EXAM:  GENERAL: NAD,well-developed deshevled  HEAD:  Atraumatic, Normocephalic  EYES: EOMI, PERRLA, conjunctiva and sclera clear  ENMT:; Moist mucous membranes, poor dentition, No lesions  NECK: Supple, No JVD, Normal thyroid  CHEST/LUNG: Clear bs bilaterally; No rales, rhonchi, wheezing  HEART: Regular rate and rhythm; No murmurs, rubs, or gallops  ABDOMEN: Soft, Nontender, Nondistended; Bowel sounds present  EXTREMITIES:  2+ Peripheral Pulses, No clubbing, cyanosis, or edema  LYMPH: No lymphadenopathy noted  SKIN: No rashes or lesions  Neuro: alert  no focal deficits    Consultant(s) Notes Reviewed:  [x ] YES  [ ] NO  Care Discussed with Consultants/Other Providers [ x] YES  [ ] NO    LABS:              CAPILLARY BLOOD GLUCOSE                RADIOLOGY & ADDITIONAL TESTS:    Imaging Personally Reviewed:  [ ] YES  [ ] NO

## 2020-08-03 NOTE — PROGRESS NOTE BEHAVIORAL HEALTH - NSBHCHARTREVIEWVS_PSY_A_CORE FT
Vital Signs Last 24 Hrs  T(C): --  T(F): --  HR: --  BP: --  BP(mean): --  RR: --  SpO2: -- Vital Signs Last 24 Hrs Patient refused  T(C): --  T(F): --  HR: --  BP: --  BP(mean): --  RR: --  SpO2: --
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
## 2020-08-03 NOTE — CHART NOTE - NSCHARTNOTEFT_GEN_A_CORE
Social Work Admit Note:    Ms. Frazier is 65 years of age female  who was admitted for D/O Thought and behavior as well as medication non-compliance. Pt was BIB EMS from [facility] where she was receiving Short Term Rehab. Pt was brought to IPP from the Medical Floor once UTI was resolved. Even with treatment of UTI pt still presents disorganized and is refusing any and all psych medications at this time. Per report, pt was in [facility] for 2 years. Pt was unable to tell us how she got to [facility]. Pt at this time is s poor historian. NP states she will contact family contact on file for some more information,     When meeting with the pt, she was lying in bed and refused to come to treatment team. Pt was tangential in speech and reported that the world is ending, and stating that the “chinese people put her into [facility]”. Pt reports that [facility] is closing down and that is why she is in the hospital. SW spoke with [facility] Lesion Xin who reports that pt is currently at [facility] on a short term basis and once stable could return. Pt also has Medicaid at this time.     Sexual History – patient identifies as heterosexual. 	    Family relationships and history – Patient’s primary social support is her daughter    Leisure Activity Assessment – Not Identified    Community Supports –[facility].     Employment – patient is not employed     Substance Use Assessment – no use reported    History of suicidality or self- injurious behaviors – none identified      Significant Loses – None identified.      Life Goals – patient verbalized goal of improved mental health     will continue to meet with patient 1:1 and with treatment team daily.  Discharge plan is for continued treatment @ [facility] [place]  Please refer to Social Work Psychosocial for additional information.

## 2020-08-03 NOTE — CONSULT NOTE ADULT - PROBLEM SELECTOR RECOMMENDATION 9
continue present treatment as per psych plan as reviewed  Medically stable with no new changes in treatment  will continue to monitor medical status while being treated on psych  h/o gff in [facility]

## 2020-08-03 NOTE — PROGRESS NOTE BEHAVIORAL HEALTH - NSBHADDHXPSYSOCFT_PSY_A_CORE
Lives at [facility] Paramedian Forehead Flap Text: A decision was made to reconstruct the defect utilizing an interpolation axial flap and a staged reconstruction.  A telfa template was made of the defect.  This telfa template was then used to outline the paramedian forehead pedicle flap.  The donor area for the pedicle flap was then injected with anesthesia.  The flap was excised through the skin and subcutaneous tissue down to the layer of the underlying musculature.  The pedicle flap was carefully excised within this deep plane to maintain its blood supply.  The edges of the donor site were undermined.   The donor site was closed in a primary fashion.  The pedicle was then rotated into position and sutured.  Once the tube was sutured into place, adequate blood supply was confirmed with blanching and refill.  The pedicle was then wrapped with xeroform gauze and dressed appropriately with a telfa and gauze bandage to ensure continued blood supply and protect the attached pedicle.

## 2020-08-04 DIAGNOSIS — J44.9 CHRONIC OBSTRUCTIVE PULMONARY DISEASE, UNSPECIFIED: ICD-10-CM

## 2020-08-04 DIAGNOSIS — Z91.14 PATIENT'S OTHER NONCOMPLIANCE WITH MEDICATION REGIMEN: ICD-10-CM

## 2020-08-04 DIAGNOSIS — Z88.0 ALLERGY STATUS TO PENICILLIN: ICD-10-CM

## 2020-08-04 DIAGNOSIS — N39.0 URINARY TRACT INFECTION, SITE NOT SPECIFIED: ICD-10-CM

## 2020-08-04 DIAGNOSIS — F17.210 NICOTINE DEPENDENCE, CIGARETTES, UNCOMPLICATED: ICD-10-CM

## 2020-08-04 DIAGNOSIS — F03.90 UNSPECIFIED DEMENTIA WITHOUT BEHAVIORAL DISTURBANCE: ICD-10-CM

## 2020-08-04 DIAGNOSIS — I10 ESSENTIAL (PRIMARY) HYPERTENSION: ICD-10-CM

## 2020-08-04 DIAGNOSIS — F20.9 SCHIZOPHRENIA, UNSPECIFIED: ICD-10-CM

## 2020-08-04 DIAGNOSIS — R41.82 ALTERED MENTAL STATUS, UNSPECIFIED: ICD-10-CM

## 2020-08-04 DIAGNOSIS — F31.9 BIPOLAR DISORDER, UNSPECIFIED: ICD-10-CM

## 2020-08-04 DIAGNOSIS — K21.9 GASTRO-ESOPHAGEAL REFLUX DISEASE WITHOUT ESOPHAGITIS: ICD-10-CM

## 2020-08-04 DIAGNOSIS — Z86.718 PERSONAL HISTORY OF OTHER VENOUS THROMBOSIS AND EMBOLISM: ICD-10-CM

## 2020-08-04 DIAGNOSIS — D64.9 ANEMIA, UNSPECIFIED: ICD-10-CM

## 2020-08-04 DIAGNOSIS — F20.0 PARANOID SCHIZOPHRENIA: ICD-10-CM

## 2020-08-04 PROCEDURE — 99231 SBSQ HOSP IP/OBS SF/LOW 25: CPT

## 2020-08-04 NOTE — PROGRESS NOTE BEHAVIORAL HEALTH - NSBHFUPINTERVALHXFT_PSY_A_CORE
Pt seen and evaluated, chart reviewed. As per nursing report patient continues to refuse all meds and vitals, refusing labs. Patient alert and oriented to person and place. Patient calm, no agitation or aggression noted. Impaired concentration, had to be redirected multiple times. Thoughts disorganized, illogical. When asked about medications patient states " I was weened off all medications"  Patient continues to present with paranoid delusions. Preoccupied with Nixon Trgustavo and people conspiring with him against her. Patient states she sleeps well and appetite is good. Denies depression and anxiety. Denies suicidal/homicidal ideations. Denies A/V hallucinations. Pt seen and evaluated, chart reviewed. As per nursing report patient continues to refuse all meds and vitals, refusing labs. Patient alert and oriented to person and place. Patient calm, no agitation or aggression noted. Impaired concentration, had to be redirected multiple times. Thoughts disorganized, illogical. When asked about medications patient states " I was weened off all medications"  Patient continues to present with paranoid delusions. Preoccupied with Nixon Trump and people conspiring with him against her. Patient states she sleeps well and appetite is good. Denies depression and anxiety. Denies suicidal/homicidal ideations. Denies A/V hallucinations.    Writer reviewed note from internal medicine. No new interventions at this time.

## 2020-08-04 NOTE — PROGRESS NOTE ADULT - SUBJECTIVE AND OBJECTIVE BOX
pt stable alert in NAD  no new complaints  pa note reviewd adn ntoed poitive leukocyte neg nitirates    PSY  ^PSY  Handoff  Chronic GERD  Chronic obstructive lung disease  Schizophrenia  Tobacco use disorder  Chronic obstructive lung disease  Chronic GERD  Schizophrenia    HEALTH ISSUES - PROBLEM Dx:  Tobacco use disorder  Chronic obstructive lung disease: Chronic obstructive lung disease  Chronic GERD: Chronic GERD  Schizophrenia: Schizophrenia        PAST MEDICAL & SURGICAL HISTORY:  Chronic GERD  Chronic obstructive lung disease  Schizophrenia    eggs (Other (U))  fish (Other (U))  Navane (Other (U))  penicillins (Other (U))      FAMILY HISTORY:      acetaminophen   Tablet .. 650 milliGRAM(s) Oral every 4 hours PRN  aluminum hydroxide/magnesium hydroxide/simethicone Suspension 30 milliLiter(s) Oral every 4 hours PRN  haloperidol     Tablet 1 milliGRAM(s) Oral two times a day  haloperidol     Tablet 2 milliGRAM(s) Oral every 8 hours PRN  hydrOXYzine hydrochloride 25 milliGRAM(s) Oral every 12 hours PRN  LORazepam     Tablet 1 milliGRAM(s) Oral every 12 hours PRN  nicotine  Polacrilex Gum 2 milliGRAM(s) Oral every 2 hours PRN  pantoprazole    Tablet 40 milliGRAM(s) Oral before breakfast      T(C): --  HR: --  BP: --  RR: --  SpO2: --    PE;  general:  no acute cahnges in nad    Lungs:    Heart:    EXT:    Neuro:  no deficits                          CAPILLARY BLOOD GLUCOSE

## 2020-08-04 NOTE — PROGRESS NOTE BEHAVIORAL HEALTH - NSBHCHARTREVIEWVS_PSY_A_CORE FT
Vital Signs Last 24 Hrs Patient refused  T(C): --  T(F): --  HR: --  BP: --  BP(mean): --  RR: --  SpO2: --

## 2020-08-04 NOTE — PROGRESS NOTE BEHAVIORAL HEALTH - SUMMARY
63 year old female , domiciled in nursing home, with history of COPD, DVT, bipolar, and dementia, multiple prior inpatient psych, was at [place] for 2 years, recently discharged from inpatient psych [facility] transferred to [facility], sent to the ER for continuous medication non-compliant for the past months, worsening psychosis and agitation.    Patient presents with decompensated psychiatric symptoms, secondary to medication non-compliance. Disorganized behavior noted and underlying psychosis appears to be impairing patient's judgment and behavior. Although dementia predisposed patient to become agitated but current presentation is mostly related to decompensated symptoms secondary to medication non-compliance. Patient appears to be a danger to herself at this time due to patient unable to care for herself at this time. Patient would benefit form IPP for safety and stabilization.    #Admit 2PC (9:27)    Plan:    #Schizophrenia  -Haldol 1mg BID    #Tobacco Use Disorder  -Nicotine Gum PRN    -Haldol 2mg Q8 PRN for Severe agitation  -Lorazepam 1mg Q12 PRN for agitation/anxiety  -Hydroxyzine 25mg Q12 PRN for anxiety    -Tylenol PRN for pain  -Pantoprazole for GERD  -Maalox PRN for dyspepsia 63 year old female , domiciled in nursing home, with history of COPD, DVT, bipolar, and dementia, multiple prior inpatient psych, was at [place] for 2 years, recently discharged from inpatient psych [facility] transferred to [facility], sent to the ER for continuous medication non-compliant for the past months, worsening psychosis and agitation.    Patient presents with decompensated psychiatric symptoms, secondary to medication non-compliance. Disorganized behavior noted and underlying psychosis appears to be impairing patient's judgment and behavior. Patient appears to be a danger to herself at this time due to patient unable to care for herself at this time. Patient would benefit form IPP for safety and stabilization.    #Admit 2PC (9:27)    Plan:    #Schizophrenia  -Haldol 1mg BID    #Tobacco Use Disorder  -Nicotine Gum PRN    -Haldol 2mg Q8 PRN for Severe agitation  -Lorazepam 1mg Q12 PRN for agitation/anxiety  -Hydroxyzine 25mg Q12 PRN for anxiety    -Tylenol PRN for pain  -Pantoprazole for GERD  -Maalox PRN for dyspepsia

## 2020-08-05 PROCEDURE — 99231 SBSQ HOSP IP/OBS SF/LOW 25: CPT

## 2020-08-05 RX ORDER — BENZTROPINE MESYLATE 1 MG
1 TABLET ORAL ONCE
Refills: 0 | Status: DISCONTINUED | OUTPATIENT
Start: 2020-08-05 | End: 2020-08-05

## 2020-08-05 RX ORDER — HALOPERIDOL DECANOATE 100 MG/ML
2 INJECTION INTRAMUSCULAR ONCE
Refills: 0 | Status: DISCONTINUED | OUTPATIENT
Start: 2020-08-05 | End: 2020-08-05

## 2020-08-05 RX ADMIN — HALOPERIDOL DECANOATE 1 MILLIGRAM(S): 100 INJECTION INTRAMUSCULAR at 22:44

## 2020-08-05 RX ADMIN — Medication 1 MILLIGRAM(S): at 22:44

## 2020-08-05 NOTE — PROGRESS NOTE BEHAVIORAL HEALTH - NSBHCHARTREVIEWVS_PSY_A_CORE FT
Vital Signs Last 24 Hrs  T(C): --  T(F): --  HR: 70 (05 Aug 2020 06:01) (70 - 70)  BP: 138/76 (05 Aug 2020 06:01) (138/76 - 138/76)  BP(mean): --  RR: 18 (05 Aug 2020 06:01) (18 - 18)  SpO2: --

## 2020-08-05 NOTE — PROGRESS NOTE BEHAVIORAL HEALTH - SUMMARY
63 year old female , domiciled in nursing home, with history of COPD, DVT, bipolar, and dementia, multiple prior inpatient psych, was at [place] for 2 years, recently discharged from inpatient psych [facility] transferred to [facility], sent to the ER for continuous medication non-compliant for the past months, worsening psychosis and agitation.    Patient presents with decompensated psychiatric symptoms, secondary to medication non-compliance. Disorganized behavior noted and underlying psychosis appears to be impairing patient's judgment and behavior. Patient appears to be a danger to herself at this time due to patient unable to care for herself at this time. Patient would benefit form IPP for safety and stabilization.    Thoughts continue to be disorganized, illogical. Patient continues to present with paranoid delusions.  Denies depression and anxiety. Denies suicidal/homicidal ideations. Denies A/V hallucinations.    #Admit 2PC (9:27)    Plan:    #Schizophrenia  -Haldol 1mg BID    #Tobacco Use Disorder  -Nicotine Gum PRN    -Haldol 2mg Q8 PRN for Severe agitation  -Lorazepam 1mg Q12 PRN for agitation/anxiety  -Hydroxyzine 25mg Q12 PRN for anxiety    -Tylenol PRN for pain  -Pantoprazole for GERD  -Maalox PRN for dyspepsia

## 2020-08-05 NOTE — PROGRESS NOTE BEHAVIORAL HEALTH - NSBHFUPINTERVALHXFT_PSY_A_CORE
Pt seen and evaluated, chart reviewed. As per nursing report patient continues to refuse all meds and vitals at times, refusing labs. PCA was able to obtain BP this morning. Patient alert and oriented to person and place. Patient calm, no agitation or aggression noted. Impaired concentration, had to be redirected multiple times. Thoughts continue to be disorganized, illogical. When again asked about medications patient states " Im not on any medication. I don't need medication"  Patient continues to present with paranoid delusions. Patient states she sleeps well and appetite is good. Denies depression and anxiety. Denies suicidal/homicidal ideations. Denies A/V hallucinations. Pt seen and evaluated, chart reviewed. As per nursing report patient continues to refuse all meds and vitals at times, refusing labs. PCA was able to obtain BP this morning. Patient alert and oriented to person and place. Patient calm, no agitation or aggression noted.  Impaired concentration, had to be redirected multiple times. Thoughts continue to be disorganized, illogical. When again asked about medications patient states " Im not on any medication. I don't need medication". States "everything is fine". Patient continues to present with paranoid delusions. Patient states she sleeps well and appetite is good. Denies depression and anxiety. Denies suicidal/homicidal ideations. Denies A/V hallucinations. Patient visualized on the unit engaging with peers and attending groups.

## 2020-08-05 NOTE — CHART NOTE - NSCHARTNOTEFT_GEN_A_CORE
Social Work Note:    Treatment team met with patient to discuss treatment plan, medications and discharge plan.  During the day patient is observed to be visible on unit and active in groups. Patient in good behavioral control but at this time continues to refuse all medication and vital signs. SW has spoken to [facility] who is willing to accept the pt back, once pt is stable and taking medication. While pt is pleasant in nature she does not want to talk and will just tell treatment team “everything is fine” and dismiss us. TOO to be scheduled and conducted to ensure medication compliance. SW will continue to follow.     Mental Status Exam:    Mood – Euthymic    Sleep - Good  Appetite - Good  ADLs - Good  Thought Process – Delusional/Tangential    Observation – [id number].

## 2020-08-06 PROCEDURE — 99231 SBSQ HOSP IP/OBS SF/LOW 25: CPT

## 2020-08-06 RX ORDER — HALOPERIDOL DECANOATE 100 MG/ML
3 INJECTION INTRAMUSCULAR ONCE
Refills: 0 | Status: COMPLETED | OUTPATIENT
Start: 2020-08-06 | End: 2020-08-06

## 2020-08-06 RX ADMIN — HALOPERIDOL DECANOATE 1 MILLIGRAM(S): 100 INJECTION INTRAMUSCULAR at 08:29

## 2020-08-06 RX ADMIN — Medication 1 MILLIGRAM(S): at 23:39

## 2020-08-06 RX ADMIN — HALOPERIDOL DECANOATE 2 MILLIGRAM(S): 100 INJECTION INTRAMUSCULAR at 23:39

## 2020-08-06 NOTE — PROGRESS NOTE BEHAVIORAL HEALTH - SUMMARY
63 year old female , domiciled in nursing home, with history of COPD, DVT, bipolar, and dementia, multiple prior inpatient psych, was at [place] for 2 years, recently discharged from inpatient psych [facility] transferred to [facility], sent to the ER for continuous medication non-compliant for the past months, worsening psychosis and agitation.    Patient presents with decompensated psychiatric symptoms, secondary to medication non-compliance. Disorganized behavior noted and underlying psychosis appears to be impairing patient's judgment and behavior. Patient appears to be a danger to herself at this time due to patient unable to care for herself at this time. Patient would benefit form IPP for safety and stabilization.    Thoughts continue to be disorganized, illogical. Patient continues to present with paranoid delusions.  Denies depression and anxiety. Denies suicidal/homicidal ideations. Denies A/V hallucinations.    #Admit 2PC (9:27)    Plan:    #Schizophrenia  -Haldol 1mg BID    #Tobacco Use Disorder  -Nicotine Gum PRN    -Haldol 2mg Q8 PRN for Severe agitation  -Lorazepam 1mg Q12 PRN for agitation/anxiety  -Hydroxyzine 25mg Q12 PRN for anxiety    -Tylenol PRN for pain  -Pantoprazole for GERD  -Maalox PRN for dyspepsia 63 year old female , domiciled in nursing home, with history of COPD, DVT, bipolar, and dementia, multiple prior inpatient psych, was at [place] for 2 years, recently discharged from inpatient psych [facility] transferred to [facility], sent to the ER for continuous medication non-compliant for the past months, worsening psychosis and agitation.    Patient presents with decompensated psychiatric symptoms, secondary to medication non-compliance. Disorganized behavior noted and underlying psychosis appears to be impairing patient's judgment and behavior. Patient appears to be a danger to herself at this time due to patient unable to care for herself at this time. Patient would benefit form IPP for safety and stabilization.    Thoughts continue to be disorganized, illogical. Patient continues to present with paranoid delusions.  Denies depression and anxiety. Denies suicidal/homicidal ideations. Denies A/V hallucinations.    #Admit 2PC (9:27)    Plan:    #Schizophrenia  -Haldol 1mg BID    #Tobacco Use Disorder  -Nicotine Gum PRN    -Haldol 2mg Q8 PRN for Severe agitation  -Lorazepam 1mg Q12 PRN for agitation/anxiety  -Hydroxyzine 25mg Q12 PRN for anxiety    -Tylenol PRN for pain  -Pantoprazole for GERD  -Maalox PRN for dyspepsia    -cw 1:1 elopement risk

## 2020-08-06 NOTE — PROGRESS NOTE ADULT - SUBJECTIVE AND OBJECTIVE BOX
pt stable alert in NAD  no new complaints    PSY  ^PSY  Handoff  Chronic GERD  Chronic obstructive lung disease  Schizophrenia  Chronic obstructive pulmonary disease, unspecified COPD type  Schizophrenia, unspecified type  Tobacco use disorder  Chronic obstructive lung disease  Chronic GERD  Schizophrenia    HEALTH ISSUES - PROBLEM Dx:  Chronic obstructive pulmonary disease, unspecified COPD type: Chronic obstructive pulmonary disease, unspecified COPD type  Schizophrenia, unspecified type: Schizophrenia, unspecified type  Tobacco use disorder  Chronic obstructive lung disease: Chronic obstructive lung disease  Chronic GERD: Chronic GERD  Schizophrenia: Schizophrenia        PAST MEDICAL & SURGICAL HISTORY:  Chronic GERD  Chronic obstructive lung disease  Schizophrenia    eggs (Other (U))  fish (Other (U))  Navane (Other (U))  penicillins (Other (U))      FAMILY HISTORY:      acetaminophen   Tablet .. 650 milliGRAM(s) Oral every 4 hours PRN  aluminum hydroxide/magnesium hydroxide/simethicone Suspension 30 milliLiter(s) Oral every 4 hours PRN  haloperidol     Tablet 2 milliGRAM(s) Oral every 8 hours PRN  haloperidol     Tablet 1 milliGRAM(s) Oral two times a day  hydrOXYzine hydrochloride 25 milliGRAM(s) Oral every 12 hours PRN  LORazepam     Tablet 1 milliGRAM(s) Oral every 12 hours PRN  nicotine  Polacrilex Gum 2 milliGRAM(s) Oral every 2 hours PRN  pantoprazole    Tablet 40 milliGRAM(s) Oral before breakfast      T(C): --  HR: --  BP: --  RR: --  SpO2: --    PE;  general:  still desheveled  but innad    Lungs:    Heart:    EXT:    Neuro:  no deficits                          CAPILLARY BLOOD GLUCOSE

## 2020-08-06 NOTE — PROGRESS NOTE BEHAVIORAL HEALTH - NSBHFUPINTERVALHXFT_PSY_A_CORE
Pt seen and evaluated, chart reviewed. As per nursing report patient eloped from unit. Placed on 1:1 for safety. Noted episodes of restlessness, anger, cursing at times, inappropriate behavior. Continues to periodically refuse meds and vitals at times, refusing labs. On approach during rounds patient calm, no agitation or aggression noted.  Impaired concentration, had to be redirected multiple times. Thoughts continue to be disorganized, illogical. Patient continues to present with paranoid delusions. Patient states she sleeps well and appetite is good. Denies depression and anxiety. Denies suicidal/homicidal ideations. Denies A/V hallucinations. Patient visualized on the unit engaging with peers and attending groups. Pt seen and evaluated, chart reviewed. As per nursing report patient eloped from unit. Placed on 1:1 for safety. Noted episodes of restlessness, anger, cursing at times, inappropriate behavior. Continues to periodically refuse meds and vitals at times, refusing labs. On approach during rounds patient calm, no agitation or aggression noted.  Impaired concentration, had to be redirected multiple times. Thoughts continue to be disorganized, illogical. Patient continues to present with paranoid delusions. Patient states she sleeps well and appetite is good. Denies depression and anxiety. Denies suicidal/homicidal ideations. Denies A/V hallucinations. Patient visualized on the unit engaging with peers and attending groups.    Writer reviewed note from Internal Medicine. No new interventions at this time. Pt seen and evaluated, chart reviewed. As per nursing report patient eloped from unit. Placed on 1:1 for safety. Noted episodes of restlessness, anger, cursing at times, inappropriate behavior. Continues to periodically refuse meds and vitals at times, refusing labs. On approach during rounds patient calm, no agitation or aggression noted.  Impaired concentration, had to be redirected multiple times. Thoughts continue to be disorganized, illogical. Patient continues to present with paranoid delusions. Patient states she sleeps well and appetite is good. Denies depression and anxiety. Denies suicidal/homicidal ideations. Denies A/V hallucinations. Patient visualized on the unit engaging with peers and attending groups.    Writer reviewed note from Internal Medicine. No new interventions at this time.    Placed call to patients daughter Hannah Frazier [phone]. Updated her on patients care and treatment. Made her aware of elopement incident and patients continued refusal to take meds, refusing labs and vitals. Patients daughter states she is aware of how her mother can be when she is not taking her medication. Wants her mom to get better. Daughter verbalized that a court order was needed for her to take medication before and she is in agreement if that is what is needed. Daughter states she is patients healthcare proxy and makes all medical decisions for her mom.

## 2020-08-07 PROCEDURE — 99231 SBSQ HOSP IP/OBS SF/LOW 25: CPT

## 2020-08-07 RX ADMIN — HALOPERIDOL DECANOATE 1 MILLIGRAM(S): 100 INJECTION INTRAMUSCULAR at 19:43

## 2020-08-07 RX ADMIN — PANTOPRAZOLE SODIUM 40 MILLIGRAM(S): 20 TABLET, DELAYED RELEASE ORAL at 08:00

## 2020-08-07 RX ADMIN — HALOPERIDOL DECANOATE 1 MILLIGRAM(S): 100 INJECTION INTRAMUSCULAR at 08:00

## 2020-08-07 RX ADMIN — HALOPERIDOL DECANOATE 2 MILLIGRAM(S): 100 INJECTION INTRAMUSCULAR at 16:19

## 2020-08-07 RX ADMIN — Medication 25 MILLIGRAM(S): at 00:23

## 2020-08-07 RX ADMIN — Medication 1 MILLIGRAM(S): at 19:38

## 2020-08-07 RX ADMIN — Medication 25 MILLIGRAM(S): at 19:42

## 2020-08-07 NOTE — PROGRESS NOTE BEHAVIORAL HEALTH - NSBHFUPINTERVALHXFT_PSY_A_CORE
Pt seen and evaluated, chart reviewed. As per nursing report patient verbally aggressive toward staff, IM haldol and Ativan given. 1:1 in place for safety. Continues to periodically refuse meds and vitals at times, refusing labs. On approach during rounds patient calm, no agitation or aggression noted. Continues to have impaired concentration, had to be redirected multiple times. Thoughts continue to be disorganized and illogical. Patient continues to present with paranoid delusions. Denies depression and anxiety. Denies suicidal/homicidal ideations. Denies A/V hallucinations. Patient visualized on the unit engaging with peers and attending groups. Pt seen and evaluated, chart reviewed. As per nursing report patient verbally aggressive toward staff, IM haldol and Ativan given. 1:1 in place for safety. Continues to periodically refuse meds and vitals at times, refusing labs. On approach during rounds patient calm, no agitation or aggression noted. Continues to have impaired concentration, had to be redirected multiple times. Thoughts continue to be disorganized and illogical. Patient continues to present with paranoid delusions. Continues to present with poor insight and judgement. Denies depression and anxiety. Denies suicidal/homicidal ideations. Denies A/V hallucinations. Patient visualized on the unit engaging with peers and attending groups.

## 2020-08-07 NOTE — CHART NOTE - NSCHARTNOTEFT_GEN_A_CORE
Social Work Note:    Treatment team met with patient to discuss treatment plan, medications and discharge plan.  During the day patient is observed to be visible on unit but not attending groups and can be verbally aggressive towards staff. Pt is on a 1-1 due to elopment risk as she attempted to leave the unit. Patient  continues to refuse all medication and vital signs. SW has spoken to [facility] who is willing to accept the pt back, once pt is stable and taking medication. While pt is pleasant in nature she does not want to talk and will just tell treatment team “everything is fine” and dismiss us. TOO to be scheduled and conducted to ensure medication compliance. SW will continue to follow.     Mental Status Exam:    Mood – Euthymic    Sleep - Good  Appetite - Good  ADLs - Good  Thought Process – Delusional/Tangential    Observation – [id number].

## 2020-08-07 NOTE — CHART NOTE - NSCHARTNOTEFT_GEN_A_CORE
Chart reviewed. Pt was seen and evaluated with [facility] .  Pt exhibits poor insight into her illness and regarding the need for medication. States "I don't need medication, there is nothing wrong with me."    I agree with treatment team that pt needs to be treated over objection for clinical improvement.

## 2020-08-07 NOTE — PROGRESS NOTE BEHAVIORAL HEALTH - SUMMARY
63 year old female , domiciled in nursing home, with history of COPD, DVT, bipolar, and dementia, multiple prior inpatient psych, was at [place] for 2 years, recently discharged from inpatient psych [facility] transferred to [facility], sent to the ER for continuous medication non-compliant for the past months, worsening psychosis and agitation.    Patient presents with decompensated psychiatric symptoms, secondary to medication non-compliance. Disorganized behavior noted and underlying psychosis appears to be impairing patient's judgment and behavior. Patient appears to be a danger to herself at this time due to patient unable to care for herself at this time. Patient would benefit form IPP for safety and stabilization.    Thoughts continue to be disorganized, illogical. Patient continues to present with paranoid delusions.  Denies depression and anxiety. Denies suicidal/homicidal ideations. Denies A/V hallucinations.    #Admit 2PC (9:27)    Plan:    #Schizophrenia  -Haldol 1mg BID    #Tobacco Use Disorder  -Nicotine Gum PRN    -Haldol 2mg Q8 PRN for Severe agitation  -Lorazepam 1mg Q12 PRN for agitation/anxiety  -Hydroxyzine 25mg Q12 PRN for anxiety    -Tylenol PRN for pain  -Pantoprazole for GERD  -Maalox PRN for dyspepsia    -cw 1:1 elopement risk 63 year old female , domiciled in nursing home, with history of COPD, DVT, bipolar, and dementia, multiple prior inpatient psych, was at [place] for 2 years, recently discharged from inpatient psych [facility] transferred to [facility], sent to the ER for continuous medication non-compliant for the past months, worsening psychosis and agitation.    Patient presents with decompensated psychiatric symptoms, secondary to medication non-compliance. Disorganized behavior noted and underlying psychosis appears to be impairing patient's judgment and behavior. Patient appears to be a danger to herself at this time due to patient unable to care for herself at this time. Patient would benefit form IPP for safety and stabilization.    Thoughts continue to be disorganized, illogical. Patient continues to present with paranoid delusions.  Continues to present with poor insight and judgement.  Denies depression and anxiety. Denies suicidal/homicidal ideations. Denies A/V hallucinations.    #Admit 2PC (9:27)    Plan:    #Schizophrenia  -Haldol 1mg BID    #Tobacco Use Disorder  -Nicotine Gum PRN    -Haldol 2mg Q8 PRN for Severe agitation  -Lorazepam 1mg Q12 PRN for agitation/anxiety  -Hydroxyzine 25mg Q12 PRN for anxiety    -Tylenol PRN for pain  -Pantoprazole for GERD  -Maalox PRN for dyspepsia    -cw 1:1 elopement risk

## 2020-08-08 RX ADMIN — HALOPERIDOL DECANOATE 2 MILLIGRAM(S): 100 INJECTION INTRAMUSCULAR at 23:41

## 2020-08-08 RX ADMIN — HALOPERIDOL DECANOATE 1 MILLIGRAM(S): 100 INJECTION INTRAMUSCULAR at 20:06

## 2020-08-08 RX ADMIN — HALOPERIDOL DECANOATE 1 MILLIGRAM(S): 100 INJECTION INTRAMUSCULAR at 12:36

## 2020-08-08 RX ADMIN — Medication 1 MILLIGRAM(S): at 23:41

## 2020-08-08 RX ADMIN — PANTOPRAZOLE SODIUM 40 MILLIGRAM(S): 20 TABLET, DELAYED RELEASE ORAL at 12:35

## 2020-08-09 RX ADMIN — HALOPERIDOL DECANOATE 1 MILLIGRAM(S): 100 INJECTION INTRAMUSCULAR at 20:57

## 2020-08-09 RX ADMIN — HALOPERIDOL DECANOATE 1 MILLIGRAM(S): 100 INJECTION INTRAMUSCULAR at 08:22

## 2020-08-09 NOTE — PROGRESS NOTE BEHAVIORAL HEALTH - NSBHCHARTREVIEWVS_PSY_A_CORE FT
Vital Signs Last 24 Hrs- Patient refused  T(C): --  T(F): --  HR: --  BP: --  BP(mean): --  RR: --  SpO2: --

## 2020-08-09 NOTE — PROGRESS NOTE BEHAVIORAL HEALTH - NSBHFUPINTERVALHXFT_PSY_A_CORE
Pt seen and evaluated, chart reviewed. discussed with staff 1:1 in place for safety and elopement risk  Continues to periodically refuse meds and vitals at times, refusing labs.  No acute event  overnight  On approach during rounds patient lying in bed   calm, no agitation or aggression noted. Continues to have impaired concentration, had to be redirected multiple times. Thoughts continue to be disorganized and illogical. Patient continues to present with paranoid delusions. Continues to present with poor insight and judgement. Denies depression and anxiety. Denies suicidal/homicidal ideations. Denies A/V hallucinations. Patient visualized on the unit engaging with peers and attending groups.

## 2020-08-09 NOTE — PROGRESS NOTE BEHAVIORAL HEALTH - SUMMARY
63 year old female , domiciled in nursing home, with history of COPD, DVT, bipolar, and dementia, multiple prior inpatient psych, was at [place] for 2 years, recently discharged from inpatient psych [facility] transferred to [facility], sent to the ER for continuous medication non-compliant for the past months, worsening psychosis and agitation.    Patient presents with decompensated psychiatric symptoms, secondary to medication non-compliance. Disorganized behavior noted and underlying psychosis appears to be impairing patient's judgment and behavior. Patient appears to be a danger to herself at this time due to patient unable to care for herself at this time. Patient would benefit form IPP for safety and stabilization.    Thoughts continue to be disorganized, illogical. Patient continues to present with paranoid delusions.  Continues to present with poor insight and judgement.  Denies depression and anxiety. Denies suicidal/homicidal ideations. Denies A/V hallucinations.    #Admit 2PC (9:27)    Plan:    #Schizophrenia  -Haldol 1mg BID    #Tobacco Use Disorder  -Nicotine Gum PRN    -Haldol 2mg Q8 PRN for Severe agitation  -Lorazepam 1mg Q12 PRN for agitation/anxiety  -Hydroxyzine 25mg Q12 PRN for anxiety    -Tylenol PRN for pain  -Pantoprazole for GERD  -Maalox PRN for dyspepsia    -cw 1:1 elopement risk

## 2020-08-10 PROCEDURE — 99231 SBSQ HOSP IP/OBS SF/LOW 25: CPT

## 2020-08-10 RX ADMIN — HALOPERIDOL DECANOATE 1 MILLIGRAM(S): 100 INJECTION INTRAMUSCULAR at 20:04

## 2020-08-10 RX ADMIN — Medication 1 MILLIGRAM(S): at 00:30

## 2020-08-10 RX ADMIN — HALOPERIDOL DECANOATE 1 MILLIGRAM(S): 100 INJECTION INTRAMUSCULAR at 08:09

## 2020-08-10 NOTE — PROGRESS NOTE BEHAVIORAL HEALTH - SUMMARY
63 year old female , domiciled in nursing home, with history of COPD, DVT, bipolar, and dementia, multiple prior inpatient psych, was at [place] for 2 years, recently discharged from inpatient psych [facility] transferred to [facility], sent to the ER for continuous medication non-compliant for the past months, worsening psychosis and agitation.    Patient presents with decompensated psychiatric symptoms, secondary to medication non-compliance. Disorganized behavior noted and underlying psychosis appears to be impairing patient's judgment and behavior. Patient appears to be a danger to herself at this time due to patient unable to care for herself at this time. Patient would benefit form IPP for safety and stabilization.    Thoughts continue to be disorganized, illogical. Patient continues to present with paranoid delusions.  Continues to present with poor insight and judgement.  Denies depression and anxiety. Denies suicidal/homicidal ideations. Denies A/V hallucinations.    #Admit 2PC (9:27)    Plan:    #Schizophrenia  -Haldol 1mg BID    #Tobacco Use Disorder  -Nicotine Gum PRN    -Haldol 2mg Q8 PRN for Severe agitation  -Lorazepam 1mg Q12 PRN for agitation/anxiety  -Hydroxyzine 25mg Q12 PRN for anxiety    -Tylenol PRN for pain  -Pantoprazole for GERD  -Maalox PRN for dyspepsia    -cw 1:1 elopement risk

## 2020-08-10 NOTE — PROGRESS NOTE BEHAVIORAL HEALTH - NSBHFUPINTERVALHXFT_PSY_A_CORE
Pt seen and evaluated in treatment team, chart reviewed. As per nursing report patient continues to be intermittently verbally aggressive toward staff, PRN's required for behavioral control. 1:1 in place for safety. Continues to periodically refuse meds and vitals at times, refusing labs. TOO to be scheduled and conducted to ensure medication compliance. Patient calm and cooperative during team. No agitation or aggression noted. Continues to have impaired concentration, had to be redirected multiple times. Thoughts continue to be disorganized and illogical. Periods of clarity. Patient continues to present with paranoid delusions. Continues to present with poor insight and judgement. Denies depression and anxiety. Denies suicidal/homicidal ideations. Denies A/V hallucinations. Patient visualized on the unit engaging with peers.

## 2020-08-10 NOTE — CHART NOTE - NSCHARTNOTEFT_GEN_A_CORE
Social Work Note:    Treatment team met with patient to discuss treatment plan, medications and discharge plan.  During the day patient is observed to be visible on unit but not attending groups and can be verbally aggressive towards staff. Pt is on a 1-1 due to elopement risk as she attempted to leave the unit. Patient continues to refuse all medication and vital signs. AMBROSE has spoken to [facility] who is willing to accept the pt back, once pt is stable and taking medication. While pt is pleasant in nature she does not want to talk and will just tell treatment team “everything is fine” and dismiss us. TOO to be scheduled and conducted to ensure medication compliance. SW will continue to follow.    Pt continues to attempt to make some reality based statements but is showing difficulty at this time.     AMBROSE spoke to Xin wilkins @ [facility] who stated that she can accept the pt back once she  is compliance with meds and psych stable. SW will submit LTC referral due to NO SKILLED NEED at this time.     Mental Status Exam:    Mood – Euthymic    Sleep - Good  Appetite - Good  ADLs - Good  Thought Process – Delusional/Tangential    Observation – [id number].

## 2020-08-11 PROCEDURE — 99231 SBSQ HOSP IP/OBS SF/LOW 25: CPT

## 2020-08-11 NOTE — PROGRESS NOTE BEHAVIORAL HEALTH - NSBHFUPINTERVALHXFT_PSY_A_CORE
Pt seen and evaluated, chart reviewed. As per nursing report patient continues to be intermittently irritable. 1:1 in place for safety. Continues to periodically refuse meds and vitals at times, refusing labs. TOO to be scheduled and conducted to ensure medication compliance. Patient calm and cooperative this morning. No agitation or aggression noted. Continues to have impaired concentration. Thoughts continue to be disorganized and illogical. Patient continues to present with paranoid delusions. Continues to present with poor insight and judgement. Denies depression and anxiety. Denies suicidal/homicidal ideations. Denies A/V hallucinations. Patient visualized on the unit engaging with peers. Pt seen and evaluated, chart reviewed. As per nursing report patient continues to be intermittently irritable, agitated, verbally aggressive toward staff. 1:1 in place for safety. Continues to periodically refuse meds and vitals at times, refusing labs. TOO to be scheduled and conducted to ensure medication compliance. Patient calm and cooperative this morning. No agitation or aggression noted. Continues to have impaired concentration. Thoughts continue to be disorganized and illogical. Patient continues to present with paranoid delusions. Continues to present with poor insight and judgement. Denies depression and anxiety. Denies suicidal/homicidal ideations. Denies A/V hallucinations. Patient visualized on the unit engaging with peers. Pt seen and evaluated, chart reviewed. As per nursing report patient continues to be intermittently irritable, agitated, verbally aggressive toward staff. 1:1 in place for safety. Continues to periodically refuse meds and vitals at times, refusing labs. TOO to be scheduled and conducted to ensure medication compliance. On initial approach with  patient refused to get out of bed and answer questions. Writer met with patient later. Patient calm and cooperative. No agitation or aggression noted. Continues to have impaired concentration. Thoughts continue to be disorganized and illogical. Patient continues to present with paranoid delusions. Continues to present with poor insight and judgement. Denies depression and anxiety. Denies suicidal/homicidal ideations. Denies A/V hallucinations. Patient visualized on the unit engaging with peers.

## 2020-08-11 NOTE — CHART NOTE - NSCHARTNOTEFT_GEN_A_CORE
SW and NP attempted to meet with pt, but pt refused to get out of bed or interact with staff at this time. Pt is still refusing vital signs and all medication at this time. TOO submitted and pending court date at this time. SW will continue to follow.

## 2020-08-11 NOTE — PROGRESS NOTE BEHAVIORAL HEALTH - SUMMARY
63 year old female , domiciled in nursing home, with history of COPD, DVT, bipolar, and dementia, multiple prior inpatient psych, was at [place] for 2 years, recently discharged from inpatient psych [facility] transferred to [facility], sent to the ER for continuous medication non-compliant for the past months, worsening psychosis and agitation.    Patient presents with decompensated psychiatric symptoms, secondary to medication non-compliance. Disorganized behavior noted and underlying psychosis appears to be impairing patient's judgment and behavior. Patient appears to be a danger to herself at this time due to patient unable to care for herself at this time. Patient would benefit form IPP for safety and stabilization.    Thoughts continue to be disorganized, illogical. Patient continues to present with paranoid delusions.  Continues to present with poor insight and judgement.  Denies depression and anxiety. Denies suicidal/homicidal ideations. Denies A/V hallucinations.    #Admit 2PC (9:27)    Plan:    #Schizophrenia  -Haldol 1mg BID    #Tobacco Use Disorder  -Nicotine Gum PRN    -Haldol 2mg Q8 PRN for Severe agitation  -Lorazepam 1mg Q12 PRN for agitation/anxiety  -Hydroxyzine 25mg Q12 PRN for anxiety    -Tylenol PRN for pain  -Pantoprazole for GERD  -Maalox PRN for dyspepsia    -cw 1:1 elopement risk

## 2020-08-12 PROCEDURE — 99231 SBSQ HOSP IP/OBS SF/LOW 25: CPT

## 2020-08-12 RX ORDER — BENZTROPINE MESYLATE 1 MG
1 TABLET ORAL ONCE
Refills: 0 | Status: COMPLETED | OUTPATIENT
Start: 2020-08-12 | End: 2020-08-12

## 2020-08-12 RX ORDER — HALOPERIDOL DECANOATE 100 MG/ML
2 INJECTION INTRAMUSCULAR ONCE
Refills: 0 | Status: COMPLETED | OUTPATIENT
Start: 2020-08-12 | End: 2020-08-12

## 2020-08-12 RX ADMIN — Medication 1 MILLIGRAM(S): at 23:40

## 2020-08-12 RX ADMIN — Medication 1 MILLIGRAM(S): at 01:54

## 2020-08-12 RX ADMIN — Medication 1 MILLIGRAM(S): at 23:41

## 2020-08-12 RX ADMIN — HALOPERIDOL DECANOATE 1 MILLIGRAM(S): 100 INJECTION INTRAMUSCULAR at 08:03

## 2020-08-12 RX ADMIN — HALOPERIDOL DECANOATE 2 MILLIGRAM(S): 100 INJECTION INTRAMUSCULAR at 23:40

## 2020-08-12 RX ADMIN — HALOPERIDOL DECANOATE 2 MILLIGRAM(S): 100 INJECTION INTRAMUSCULAR at 01:55

## 2020-08-12 NOTE — PROVIDER CONTACT NOTE (OTHER) - BACKGROUND
Pt admitted for altered mental status. Pt has been refusing meds. She is easily irritable. Gets verbally abusive; sometimes aggressive. High elopement risk also.

## 2020-08-12 NOTE — PROVIDER CONTACT NOTE (OTHER) - ASSESSMENT
Pt agitated and angry. Refused PO prn. Yelling & cursing at staff. Poor judgement and insight. Pt at first says I don't want either prn PO or IM. Later pt says she would rather has an IM. PO offerred and pt refused

## 2020-08-12 NOTE — PROGRESS NOTE BEHAVIORAL HEALTH - SUMMARY
63 year old female , domiciled in nursing home, with history of COPD, DVT, bipolar, and dementia, multiple prior inpatient psych, was at [place] for 2 years, recently discharged from inpatient psych [facility] transferred to [facility], sent to the ER for continuous medication non-compliant for the past months, worsening psychosis and agitation.    Patient presents with decompensated psychiatric symptoms, secondary to medication non-compliance. Disorganized behavior noted and underlying psychosis appears to be impairing patient's judgment and behavior. Patient appears to be a danger to herself at this time due to patient unable to care for herself at this time. Patient would benefit form IPP for safety and stabilization.    Thoughts continue to be disorganized, illogical. Patient continues to present with paranoid delusions.  Continues to present with poor insight and judgement.  Denies depression and anxiety. Denies suicidal/homicidal ideations. Denies A/V hallucinations.    #Admit 2PC (9:27)    Plan:    #Schizophrenia  -Haldol 1mg BID    #Tobacco Use Disorder  -Nicotine Gum PRN    -Haldol 2mg Q8 PRN for Severe agitation  -Lorazepam 1mg Q12 PRN for agitation/anxiety  -Hydroxyzine 25mg Q12 PRN for anxiety    -Tylenol PRN for pain  -Pantoprazole for GERD  -Maalox PRN for dyspepsia    -cw 1:1 elopement risk

## 2020-08-12 NOTE — CHART NOTE - NSCHARTNOTEFT_GEN_A_CORE
Social Work Note:    Treatment team met with patient to discuss treatment plan, medications and discharge plan.  During the day patient is observed to be visible on unit but not attending groups and can be verbally aggressive towards staff. Pt is on a 1-1 due to elopement risk as she attempted to leave the unit. Patient continues to refuse all medication and vital signs. However pt is taking medication sporadically with her coffee but at times will even refuse that.   Pt has been able to make some reality based statements but will than wane back into delusional thoughts. TOO is scheduled for Monday 8/17/2020.     SW spoke to Xin wilkins @ [facility] who stated that she can accept the pt back once she  is compliance with meds and psych stable. SW will submit LTC referral due to NO SKILLED NEED at this time.     Mental Status Exam:    Mood – Euthymic    Sleep - Good  Appetite - Good  ADLs - Good  Thought Process – Delusional/Tangential    Observation – [id number].

## 2020-08-12 NOTE — PROVIDER CONTACT NOTE (OTHER) - SITUATION
Pt attempted to walk into a male pt's room. When pt was redirected, she began yelling, cursing at staff and raised her hand to hit staffmember. Began to make derogatory hand gestures at staff.

## 2020-08-12 NOTE — PROVIDER CONTACT NOTE (OTHER) - REASON
Pt agiatated, tried to walk into another pt's room. raised hand to hit staffmember, cursing, yelling

## 2020-08-12 NOTE — PROGRESS NOTE BEHAVIORAL HEALTH - NSBHFUPINTERVALHXFT_PSY_A_CORE
Pt seen and evaluated, chart reviewed. As per nursing report patient continues to be intermittently irritable, agitated, verbally aggressive toward staff. 1:1 in place for safety/elopement risk. Continues to periodically refuse meds and vitals at times, refusing labs. TOO to be scheduled and conducted to ensure medication compliance. Patient continues to have impaired concentration. Thoughts continue to be disorganized and illogical. Patient continues to present with paranoid delusions. Continues to present with poor insight and judgement. Denies depression and anxiety. Denies suicidal/homicidal ideations. Denies A/V hallucinations. Patient visualized on the unit engaging with peers. Pt seen and evaluated, chart reviewed. As per nursing report patient continues to be intermittently irritable, agitated, verbally aggressive toward staff. 1:1 in place for safety/elopement risk. Continues to periodically refuse meds and vitals at times, refusing labs. TOO to be scheduled and conducted to ensure medication compliance. Patient continues to have impaired concentration. Thoughts continue to be disorganized and illogical. Patient continues to present with paranoid delusions. Continues to present with poor insight and judgement. Denies depression and anxiety. Denies suicidal/homicidal ideations. Denies A/V hallucinations. Patient visualized on the unit engaging with peers.      At end of day writer notified Pt seemed to collapse in hallway while on the phone. Pt was caught by 1:1 before falling to the floor. Pt assisted to her room; no injuries. However, pt is refusing vitals, labs, and other diagnostic tests. Writer assessed patient who seemed to be fine. Observed in dinning room eating dinner. Pt seen and evaluated, chart reviewed. As per nursing report patient continues to be intermittently irritable, agitated, verbally aggressive toward staff. 1:1 in place for safety/elopement risk. Continues to periodically refuse meds and vitals at times, refusing labs. TOO to be scheduled and conducted to ensure medication compliance. Patient continues to have impaired concentration. Thoughts continue to be disorganized and illogical. Patient continues to present with paranoid delusions. Continues to present with poor insight and judgement. Denies depression and anxiety. Denies suicidal/homicidal ideations. Denies A/V hallucinations. Patient visualized on the unit engaging with peers.      At end of day writer notified Pt seemed to collapse in hallway while on the phone. Pt was caught by 1:1 before falling to the floor. Pt assisted to her room; no injuries. However, pt is refusing vitals, labs, and other diagnostic tests. RN notified PA. Writer assessed patient who seemed to be fine. Observed in dinning room eating dinner.

## 2020-08-12 NOTE — CHART NOTE - NSCHARTNOTEFT_GEN_A_CORE
Patient reportedly was trying to enter the room of another patient. When redirected she was agitated and was trying to hit staff. She was not redirectable and continued to be agitated. when offered PO medications she refused and was cursing at staff.   She was given Haldol 2 mg IM, Ativan 1 mg IM, Cogentin 1 mg IM. After the medications she calmed down and more redirectable but continued to be delusional.

## 2020-08-13 PROCEDURE — 99231 SBSQ HOSP IP/OBS SF/LOW 25: CPT

## 2020-08-13 RX ADMIN — HALOPERIDOL DECANOATE 1 MILLIGRAM(S): 100 INJECTION INTRAMUSCULAR at 08:07

## 2020-08-13 RX ADMIN — HALOPERIDOL DECANOATE 1 MILLIGRAM(S): 100 INJECTION INTRAMUSCULAR at 21:53

## 2020-08-13 NOTE — PROGRESS NOTE BEHAVIORAL HEALTH - SUMMARY
63 year old female , domiciled in nursing home, with history of COPD, DVT, bipolar, and dementia, multiple prior inpatient psych, was at [place] for 2 years, recently discharged from inpatient psych [facility] transferred to [facility], sent to the ER for continuous medication non-compliant for the past months, worsening psychosis and agitation.    Patient presents with decompensated psychiatric symptoms, secondary to medication non-compliance. Disorganized behavior noted and underlying psychosis appears to be impairing patient's judgment and behavior. Patient appears to be a danger to herself at this time due to patient unable to care for herself at this time. Patient would benefit form IPP for safety and stabilization.    Thoughts continue to be disorganized, illogical. Patient continues to present with paranoid delusions.  Continues to present with poor insight and judgement.  Denies depression and anxiety. Denies suicidal/homicidal ideations. Denies A/V hallucinations.    #Admit 2PC (9:27)    Plan:    #Schizophrenia  -Haldol 1mg BID    #Tobacco Use Disorder  -Nicotine Gum PRN    -Haldol 2mg Q8 PRN for Severe agitation  -Lorazepam 1mg Q12 PRN for agitation/anxiety  -Hydroxyzine 25mg Q12 PRN for anxiety    -Tylenol PRN for pain  -Pantoprazole for GERD  -Maalox PRN for dyspepsia    -cw 1:1 elopement risk

## 2020-08-13 NOTE — PROGRESS NOTE BEHAVIORAL HEALTH - NSBHFUPINTERVALHXFT_PSY_A_CORE
Pt seen and evaluated, chart reviewed. As per nursing report patient continues to be intermittently irritable, agitated, verbally and physically aggressive toward staff. Required Haldol 2 mg IM, Ativan 1 mg IM, Cogentin 1 mg IM.1:1 in place for safety/elopement risk. Patient stumbled at end of the day yesterday. Patient assessed again today by writer. No injury noted. Continues to periodically refuse meds and vitals at times, refusing labs. TOO to be scheduled and conducted to ensure medication compliance. Patient continues to have impaired concentration. Thoughts continue to be disorganized and illogical. Patient continues to present with paranoid delusions. Continues to present with poor insight and judgement. Denies depression and anxiety. Denies suicidal/homicidal ideations. Denies A/V hallucinations. Patient visualized on the unit engaging with peers.    Writer reviewed note from PA: a/p  1.weakness  -c/w 1 to 1 for safety  -ambulate as tolerated, arise slowly from recumbent position  -encourage po fluids  -monitor pt for return of sx  -get a set of vital signs preferably orthostatic if able  -get a set of labs if pt is agreeable  -pt consult prn  -recall prn.

## 2020-08-14 PROCEDURE — 99231 SBSQ HOSP IP/OBS SF/LOW 25: CPT

## 2020-08-14 RX ADMIN — HALOPERIDOL DECANOATE 1 MILLIGRAM(S): 100 INJECTION INTRAMUSCULAR at 11:41

## 2020-08-14 RX ADMIN — Medication 1 MILLIGRAM(S): at 03:21

## 2020-08-14 NOTE — PROGRESS NOTE BEHAVIORAL HEALTH - SUMMARY
63 year old female , domiciled in nursing home, with history of COPD, DVT, bipolar, and dementia, multiple prior inpatient psych, was at [place] for 2 years, recently discharged from inpatient psych [facility] transferred to [facility], sent to the ER for continuous medication non-compliant for the past months, worsening psychosis and agitation.    Patient presents with decompensated psychiatric symptoms, secondary to medication non-compliance. Disorganized behavior noted and underlying psychosis appears to be impairing patient's judgment and behavior. Patient appears to be a danger to herself at this time due to patient unable to care for herself at this time. Patient would benefit form IPP for safety and stabilization.    Thoughts continue to be disorganized, illogical. Patient continues to present with paranoid delusions.  Continues to present with poor insight and judgement.  Denies depression and anxiety. Denies suicidal/homicidal ideations. Denies A/V hallucinations.    #Admit 2PC (9:27)    Plan:    #Schizophrenia  -Haldol 1mg BID    #Tobacco Use Disorder  -Nicotine Gum PRN    -Haldol 2mg Q8 PRN for Severe agitation  -Lorazepam 1mg Q12 PRN for agitation/anxiety  -Hydroxyzine 25mg Q12 PRN for anxiety    -Tylenol PRN for pain  -Pantoprazole for GERD  -Maalox PRN for dyspepsia    -cw 1:1 elopement risk

## 2020-08-14 NOTE — PROGRESS NOTE BEHAVIORAL HEALTH - NSBHFUPINTERVALHXFT_PSY_A_CORE
Pt seen and evaluated, chart reviewed. As per nursing report patient continues to be intermittently irritable, agitated, verbally and physically aggressive toward staff. Required ativan PRN with good effect.1:1 in place for safety/elopement risk. Continues to periodically refuse meds and vitals at times, refusing labs. TOO scheduled to be conducted to ensure medication compliance. Patient continues to have impaired concentration. Thoughts continue to be disorganized and illogical. Patient continues to present with paranoid delusions. Continues to present with poor insight and judgement. Denies depression and anxiety. Denies suicidal/homicidal ideations. Denies A/V hallucinations. Patient visualized on the unit engaging with peers. Patient attended group yesterday.

## 2020-08-14 NOTE — CHART NOTE - NSCHARTNOTEFT_GEN_A_CORE
Social Work Note:    Treatment team met with patient to discuss treatment plan, medications and discharge plan.  During the day patient is observed to be visible on unit but not attending groups and can be verbally aggressive towards staff. Pt is on a 1-1 due to elopement risk as she attempted to leave the unit. Patient continues to refuse all medication and vital signs. However pt is taking medication sporadically with her coffee but at times will even refuse that.   Pt has been able to make some reality based statements but will than wane back into delusional thoughts. TOO is scheduled for Monday 8/17/2020.     SW spoke to Xin wilkins @ [facility] who stated that she can accept the pt back once she  is compliance with meds and psych stable. SW will submit LTC referral due to NO SKILLED NEED at this time.     Mental Status Exam:    Mood – Euthymic    Sleep - Good  Appetite - Good  ADLs - Good  Thought Process – Delusional/Tangential    Observation – [id number].

## 2020-08-15 PROCEDURE — 99232 SBSQ HOSP IP/OBS MODERATE 35: CPT

## 2020-08-15 NOTE — PROGRESS NOTE BEHAVIORAL HEALTH - NSBHCHARTREVIEWVS_PSY_A_CORE FT
Vital Signs Last 24 Hrs  T(C): --  T(F): --  HR: --  BP: --  BP(mean): --  RR: 16 (15 Aug 2020 06:29) (16 - 16)  SpO2: --

## 2020-08-15 NOTE — PROGRESS NOTE BEHAVIORAL HEALTH - SUMMARY
63 year old female , domiciled in nursing home, with history of COPD, DVT, bipolar, and dementia, multiple prior inpatient psych, was at [place] for 2 years, recently discharged from inpatient psych [facility] transferred to [facility], sent to the ER for continuous medication non-compliant for the past months, worsening psychosis and agitation.    Upon assessment today patient appears to be disorganized, illogical and making nonsensical statements.    #Admit 2PC (9:27)    Plan:    #Schizophrenia  -Haldol 1mg BID    #Tobacco Use Disorder  -Nicotine Gum PRN    -Haldol 2mg Q8 PRN for Severe agitation  -Lorazepam 1mg Q12 PRN for agitation/anxiety  -Hydroxyzine 25mg Q12 PRN for anxiety    -Tylenol PRN for pain  -Pantoprazole for GERD  -Maalox PRN for dyspepsia    -cw 1:1 elopement risk

## 2020-08-15 NOTE — PROGRESS NOTE BEHAVIORAL HEALTH - NSBHFUPINTERVALHXFT_PSY_A_CORE
Pt seen and evaluated, chart reviewed. As per staff, patient remains intrusive. Upon approach patient is seen by the day room. Patient reports no new concerns. Patient state she was "weaned off all of her medications" by her doctor before coming to the hospital. Patient reports, "I don't need to take anything." Patient then makes nonsensical statements about a doctor to whom she once saw.

## 2020-08-16 RX ADMIN — HALOPERIDOL DECANOATE 1 MILLIGRAM(S): 100 INJECTION INTRAMUSCULAR at 08:00

## 2020-08-17 PROCEDURE — 99231 SBSQ HOSP IP/OBS SF/LOW 25: CPT

## 2020-08-17 RX ORDER — HALOPERIDOL DECANOATE 100 MG/ML
3 INJECTION INTRAMUSCULAR ONCE
Refills: 0 | Status: COMPLETED | OUTPATIENT
Start: 2020-08-17 | End: 2020-08-18

## 2020-08-17 RX ORDER — DIPHENHYDRAMINE HCL 50 MG
25 CAPSULE ORAL ONCE
Refills: 0 | Status: COMPLETED | OUTPATIENT
Start: 2020-08-17 | End: 2020-08-18

## 2020-08-17 NOTE — CHART NOTE - NSCHARTNOTEFT_GEN_A_CORE
Social Work Note:    Treatment team met with patient to discuss treatment plan, medications and discharge plan.  During the day patient is observed to be visible on unit but not attending groups and can be verbally aggressive towards staff. Pt is on a 1-1 due to elopement risk as she attempted to leave the unit. Patient continues to refuse all medication and vital signs. However pt is taking medication sporadically with her coffee but at times will even refuse that.   Pt has been able to make some reality based statements but will than wane back into delusional thoughts. TOO is scheduled for TODAY. SW will updated with TOO determination    .   Mental Status Exam:    Mood – Euthymic    Sleep - Good  Appetite - Good  ADLs - Good  Thought Process – Delusional/Tangential    Observation –1-1 constant

## 2020-08-17 NOTE — PROGRESS NOTE BEHAVIORAL HEALTH - SUMMARY
63 year old female , domiciled in nursing home, with history of COPD, DVT, bipolar, and dementia, multiple prior inpatient psych, was at [place] for 2 years, recently discharged from inpatient psych [facility] transferred to [facility], sent to the ER for continuous medication non-compliant for the past months, worsening psychosis and agitation.    Patient presents with decompensated psychiatric symptoms, secondary to medication non-compliance. Disorganized behavior noted and underlying psychosis appears to be impairing patient's judgment and behavior. Patient appears to be a danger to herself at this time due to patient unable to care for herself at this time. Patient would benefit form IPP for safety and stabilization.    Thoughts continue to be disorganized, illogical. Patient continues to present with paranoid delusions.  Continues to present with poor insight and judgement.  Denies depression and anxiety. Denies suicidal/homicidal ideations. Denies A/V hallucinations.    #Admit 2PC (9:27)    Plan:    #Schizophrenia  -Haldol 1mg BID    #Tobacco Use Disorder  -Nicotine Gum PRN    -Haldol 2mg Q8 PRN for Severe agitation  -Lorazepam 1mg Q12 PRN for agitation/anxiety  -Hydroxyzine 25mg Q12 PRN for anxiety    -Tylenol PRN for pain  -Pantoprazole for GERD  -Maalox PRN for dyspepsia    -cw 1:1 elopement risk

## 2020-08-17 NOTE — PROGRESS NOTE BEHAVIORAL HEALTH - NSBHFUPINTERVALHXFT_PSY_A_CORE
Pt seen and evaluated, chart reviewed. As per nursing report patient continues to be intermittently irritable, agitated, verbally and physically aggressive toward staff at times.1:1 in place for safety/elopement risk. Continues to periodically refuse meds and vitals at times, refusing labs. TOO to be scheduled to be conducted to ensure medication compliance. Patient continues to have impaired concentration. Thoughts continue to be disorganized and illogical. Patient continues to present with paranoid delusions. Continues to present with poor insight and judgement. Patient continues to state that she feels fine and does not need any medication. Denies depression and anxiety. Denies suicidal/homicidal ideations. Denies A/V hallucinations. Patient visualized on the unit engaging with peers.

## 2020-08-17 NOTE — PROGRESS NOTE BEHAVIORAL HEALTH - NSBHCHARTREVIEWVS_PSY_A_CORE FT
Vital Signs Last 24 Hrs  T(C): 37.1 (17 Aug 2020 05:41), Max: 37.1 (17 Aug 2020 05:41)  T(F): 98.7 (17 Aug 2020 05:41), Max: 98.7 (17 Aug 2020 05:41)  HR: 69 (17 Aug 2020 05:41) (69 - 69)  BP: 135/63 (17 Aug 2020 05:41) (135/63 - 135/63)  BP(mean): --  RR: 18 (17 Aug 2020 05:41) (18 - 18)  SpO2: --

## 2020-08-18 PROCEDURE — 99231 SBSQ HOSP IP/OBS SF/LOW 25: CPT

## 2020-08-18 RX ORDER — HYDROXYZINE HCL 10 MG
25 TABLET ORAL EVERY 12 HOURS
Refills: 0 | Status: ACTIVE | OUTPATIENT
Start: 2020-08-18 | End: 2021-07-17

## 2020-08-18 RX ADMIN — HALOPERIDOL DECANOATE 3 MILLIGRAM(S): 100 INJECTION INTRAMUSCULAR at 00:01

## 2020-08-18 RX ADMIN — Medication 25 MILLIGRAM(S): at 00:01

## 2020-08-18 RX ADMIN — Medication 1 MILLIGRAM(S): at 00:01

## 2020-08-18 NOTE — PROGRESS NOTE BEHAVIORAL HEALTH - NSBHFUPINTERVALHXFT_PSY_A_CORE
Pt seen and evaluated, chart reviewed. As per nursing report patient continues to be intermittently irritable, agitated, verbally and physically aggressive toward staff at times. Required IM Benadryl, Haldol, Ativan with good effect. 1:1 in place for safety/elopement risk. Continues to periodically refuse meds and vitals at times, refusing labs. TOO to be scheduled to be conducted to ensure medication compliance. Patient continues to have impaired concentration. Thoughts continue to be disorganized and illogical. Patient continues to present with paranoid delusions. Continues to present with poor insight and judgement. Denies depression and anxiety. Denies suicidal/homicidal ideations. Denies A/V hallucinations. Patient visualized on the unit engaging with peers.

## 2020-08-18 NOTE — PROGRESS NOTE BEHAVIORAL HEALTH - SUMMARY
63 year old female , domiciled in nursing home, with history of COPD, DVT, bipolar, and dementia, multiple prior inpatient psych, was at [place] for 2 years, recently discharged from inpatient psych [facility] transferred to [facility], sent to the ER for continuous medication non-compliant for the past months, worsening psychosis and agitation.    Patient presents with decompensated psychiatric symptoms, secondary to medication non-compliance. Disorganized behavior noted and underlying psychosis appears to be impairing patient's judgment and behavior. Patient appears to be a danger to herself at this time due to patient unable to care for herself at this time. Patient would benefit form IPP for safety and stabilization.    Thoughts continue to be disorganized, illogical. Patient continues to present with paranoid delusions.  Continues to present with poor insight and judgement.  Denies depression and anxiety. Denies suicidal/homicidal ideations. Denies A/V hallucinations.    #Admit 2PC (9:27)    Plan:    #Schizophrenia  -Haldol 1mg BID    #Tobacco Use Disorder  -Nicotine Gum PRN    -Haldol 2mg Q8 PRN for Severe agitation  -Lorazepam 1mg Q12 PRN for agitation  -Hydroxyzine 25mg Q12 PRN for anxiety    -Tylenol PRN for pain  -Pantoprazole for GERD  -Maalox PRN for dyspepsia    -cw 1:1 elopement risk

## 2020-08-19 PROCEDURE — 99231 SBSQ HOSP IP/OBS SF/LOW 25: CPT

## 2020-08-19 RX ADMIN — PANTOPRAZOLE SODIUM 40 MILLIGRAM(S): 20 TABLET, DELAYED RELEASE ORAL at 07:36

## 2020-08-19 NOTE — PROGRESS NOTE BEHAVIORAL HEALTH - NSBHCHARTREVIEWVS_PSY_A_CORE FT
Vital Signs Last 24 Hrs  T(C): 36.6 (19 Aug 2020 08:19), Max: 36.6 (19 Aug 2020 08:19)  T(F): 97.8 (19 Aug 2020 08:19), Max: 97.8 (19 Aug 2020 08:19)  HR: 81 (19 Aug 2020 08:19) (81 - 81)  BP: 101/80 (19 Aug 2020 08:19) (101/80 - 101/80)  BP(mean): --  RR: 16 (19 Aug 2020 08:19) (16 - 16)  SpO2: --

## 2020-08-19 NOTE — PROGRESS NOTE BEHAVIORAL HEALTH - NSBHADMITIPOBS_PSY_A_CORE
Constant observation Purse String (Simple) Text: Given the location of the defect and the characteristics of the surrounding skin a purse string simple closure was deemed most appropriate.  Undermining was performed circumferentially around the surgical defect.  A purse string suture was then placed and tightened.

## 2020-08-19 NOTE — CHART NOTE - NSCHARTNOTEFT_GEN_A_CORE
Social Work Note:    Treatment team met with patient to discuss treatment plan, medications and discharge plan.  During the day patient is observed to be visible on unit but not attending groups and can be verbally aggressive towards staff. Pt is on a 1-1 due to elopement risk as she attempted to leave the unit. Patient continues to refuse all medication and vital signs. However pt is taking medication sporadically with her coffee but at times will even refuse that.     Pt has been able to make some reality based statements but will than wane back into delusional thoughts.     TOO was cancelled on Monday 8/17/2020 due to need for specific court. TOO now scheduled for next Monday 8/24/2020. SW will continue to follow.   .   Mental Status Exam:    Mood – Euthymic    Sleep - Good  Appetite - Good  ADLs - Good  Thought Process – Delusional/Tangential    Observation –1-1 constant.

## 2020-08-19 NOTE — PROGRESS NOTE BEHAVIORAL HEALTH - NSBHFUPINTERVALHXFT_PSY_A_CORE
Pt seen and evaluated, chart reviewed. As per nursing report patient continues to be intermittently irritable, agitated, verbally and physically aggressive toward staff at times. 1:1 in place for safety/elopement risk. Continues to periodically refuse meds and vitals at times, refusing labs. TOO to be scheduled to be conducted to ensure medication compliance. Patient continues to have impaired concentration. Thoughts continue to be disorganized and illogical. Patient continues to present with paranoid delusions. Continues to present with poor insight and judgement. Patient still states she does not need any medication. Denies depression and anxiety. Denies suicidal/homicidal ideations. Denies A/V hallucinations. Patient visualized on the unit engaging with peers.

## 2020-08-20 PROCEDURE — 99231 SBSQ HOSP IP/OBS SF/LOW 25: CPT

## 2020-08-20 NOTE — PROGRESS NOTE BEHAVIORAL HEALTH - SUMMARY
63 year old female , domiciled in nursing home, with history of COPD, DVT, bipolar, and dementia, multiple prior inpatient psych, was at [place] for 2 years, recently discharged from inpatient psych [facility] transferred to [facility], sent to the ER for continuous medication non-compliant for the past months, worsening psychosis and agitation.    Patient presents with decompensated psychiatric symptoms, secondary to medication non-compliance. Disorganized behavior noted and underlying psychosis appears to be impairing patient's judgment and behavior. Patient appears to be a danger to herself at this time due to patient unable to care for herself at this time. Patient would benefit form IPP for safety and stabilization.    Thoughts continue to be disorganized, illogical. Patient continues to present with paranoid delusions.  Continues to present with poor insight and judgement.  Denies depression and anxiety. Denies suicidal/homicidal ideations. Denies A/V hallucinations.    #Admit 2PC (9:27)    Plan:    #Schizophrenia  -Haldol 1mg BID    #Tobacco Use Disorder  -Nicotine Gum PRN    -Haldol 2mg Q8 PRN for Severe agitation  -Lorazepam 1mg Q12 PRN for agitation  -Hydroxyzine 25mg Q12 PRN for anxiety    -Tylenol PRN for pain  -Pantoprazole for GERD  -Maalox PRN for dyspepsia    -cw 1:1 elopement risk

## 2020-08-20 NOTE — CHART NOTE - NSCHARTNOTEFT_GEN_A_CORE
Pt had tele-legal hearing for treatment over objection today.  Hospital obtained order for meds; will discus again with patient and attempt to encourage voluntary compliance with meds.

## 2020-08-20 NOTE — PROGRESS NOTE BEHAVIORAL HEALTH - NSBHFUPINTERVALHXFT_PSY_A_CORE
Pt seen and evaluated, chart reviewed. As per nursing report patient continues to be intermittently irritable, agitated, verbally and physically aggressive toward staff at times. 1:1 in place for safety/elopement risk. Continues to periodically refuse meds and vitals at times, refusing labs. TOO scheduled to be conducted today to ensure medication compliance. Patient continues to have impaired concentration. Thoughts continue to be disorganized and illogical. Patient continues to present with paranoid delusions. Continues to present with poor insight and judgement. Patient still states she does not need any medication. Denies depression and anxiety. Denies suicidal/homicidal ideations. Denies A/V hallucinations. Patient visualized on the unit engaging with peers.

## 2020-08-21 PROCEDURE — 99231 SBSQ HOSP IP/OBS SF/LOW 25: CPT

## 2020-08-21 RX ORDER — HALOPERIDOL DECANOATE 100 MG/ML
1 INJECTION INTRAMUSCULAR EVERY 12 HOURS
Refills: 0 | Status: DISCONTINUED | OUTPATIENT
Start: 2020-08-21 | End: 2020-08-31

## 2020-08-21 RX ORDER — HALOPERIDOL DECANOATE 100 MG/ML
1 INJECTION INTRAMUSCULAR
Refills: 0 | Status: DISCONTINUED | OUTPATIENT
Start: 2020-08-21 | End: 2020-08-21

## 2020-08-21 RX ORDER — HALOPERIDOL DECANOATE 100 MG/ML
2 INJECTION INTRAMUSCULAR EVERY 8 HOURS
Refills: 0 | Status: DISCONTINUED | OUTPATIENT
Start: 2020-08-21 | End: 2020-09-21

## 2020-08-21 RX ORDER — HALOPERIDOL DECANOATE 100 MG/ML
1 INJECTION INTRAMUSCULAR ONCE
Refills: 0 | Status: COMPLETED | OUTPATIENT
Start: 2020-08-21 | End: 2020-08-21

## 2020-08-21 RX ADMIN — Medication 1 MILLIGRAM(S): at 04:00

## 2020-08-21 RX ADMIN — HALOPERIDOL DECANOATE 1 MILLIGRAM(S): 100 INJECTION INTRAMUSCULAR at 11:38

## 2020-08-21 RX ADMIN — HALOPERIDOL DECANOATE 1 MILLIGRAM(S): 100 INJECTION INTRAMUSCULAR at 20:21

## 2020-08-21 NOTE — PROGRESS NOTE BEHAVIORAL HEALTH - NSBHFUPINTERVALHXFT_PSY_A_CORE
Pt seen and evaluated in team, chart reviewed. As per nursing report patient continues to be intermittently irritable, agitated, verbally and physically aggressive toward staff at times. 1:1 in place for safety/elopement risk. Continues to periodically refuse meds and vitals at times, refusing labs. TOO granted to ensure medication compliance. Patient continues to have impaired concentration. Thoughts continue to be disorganized and illogical. Patient continues to present with paranoid delusions. Continues to present with poor insight and judgement. Patient still states she does not need any medication. Denies depression and anxiety. Denies suicidal/homicidal ideations. Denies A/V hallucinations. Patient visualized on the unit engaging with peers. Pt seen and evaluated in team, chart reviewed. As per nursing report patient continues to be intermittently irritable, agitated, verbally and physically aggressive toward staff at times. 1:1 in place for safety/elopement risk. Continues to periodically refuse meds and vitals at times, refusing labs. During team patient encouraged to voluntarily take medication. Patient refused and became agitated. Continues to sated that she is fine and does not need medication. TOO granted to ensure medication compliance. Patient continues to have impaired concentration. Thoughts continue to be disorganized and illogical. Patient continues to present with paranoid delusions. Continues to present with poor insight and judgement. Patient still states she does not need any medication. Denies depression and anxiety. Denies suicidal/homicidal ideations. Denies A/V hallucinations. Patient visualized on the unit engaging with peers. Pt seen and evaluated in team, chart reviewed. As per nursing report patient continues to be intermittently irritable, agitated, verbally and physically aggressive toward staff at times. 1:1 in place for safety/elopement risk. Continues to periodically refuse meds and vitals at times, refusing labs. During team patient encouraged to voluntarily take medication. Patient refused and became agitated. Continues to sate that she is fine and does not need medication. TOO granted to ensure medication compliance. Patient continues to have impaired concentration. Thoughts continue to be disorganized and illogical. Patient continues to present with paranoid delusions. Continues to present with poor insight and judgement. Denies depression and anxiety. Denies suicidal/homicidal ideations. Denies A/V hallucinations. Patient visualized on the unit engaging with peers. Pt seen and evaluated in team, chart reviewed. As per nursing report patient continues to be intermittently irritable, agitated, verbally and physically aggressive toward staff at times. 1:1 in place for safety/elopement risk. Continues to periodically refuse meds and vitals at times, refusing labs. During team patient encouraged to voluntarily take medication. Patient refused and became agitated. Continues to state that she is fine and does not need medication. TOO granted to ensure medication compliance. Patient continues to have impaired concentration. Thoughts continue to be disorganized and illogical. Patient continues to present with paranoid delusions. Continues to present with poor insight and judgement. Denies depression and anxiety. Denies suicidal/homicidal ideations. Denies A/V hallucinations. Patient visualized on the unit engaging with peers.

## 2020-08-21 NOTE — PROGRESS NOTE BEHAVIORAL HEALTH - SUMMARY
63 year old female , domiciled in nursing home, with history of COPD, DVT, bipolar, and dementia, multiple prior inpatient psych, was at [place] for 2 years, recently discharged from inpatient psych [facility] transferred to [facility], sent to the ER for continuous medication non-compliant for the past months, worsening psychosis and agitation.    Patient presents with decompensated psychiatric symptoms, secondary to medication non-compliance. Disorganized behavior noted and underlying psychosis appears to be impairing patient's judgment and behavior. Patient appears to be a danger to herself at this time due to patient unable to care for herself at this time. Patient would benefit form IPP for safety and stabilization.    Thoughts continue to be disorganized, illogical. Patient continues to present with paranoid delusions.  Continues to present with poor insight and judgement.  Denies depression and anxiety. Denies suicidal/homicidal ideations. Denies A/V hallucinations.    #Admit 2PC (9:27)    Plan:    #Schizophrenia  -Haldol 1mg BID Po  -TOO granted Haldol 1mg BID IM if patient refuses PO    #Tobacco Use Disorder  -Nicotine Gum PRN    -Haldol 2mg Q8 PRN for Severe agitation  -Lorazepam 1mg Q12 PRN for agitation  -Hydroxyzine 25mg Q12 PRN for anxiety    -Tylenol PRN for pain  -Pantoprazole for GERD  -Maalox PRN for dyspepsia    -cw 1:1 elopement risk 63 year old female , domiciled in nursing home, with history of COPD, DVT, bipolar, and dementia, multiple prior inpatient psych, was at [place] for 2 years, recently discharged from inpatient psych [facility] transferred to [facility], sent to the ER for continuous medication non-compliant for the past months, worsening psychosis and agitation.    Patient presents with decompensated psychiatric symptoms, secondary to medication non-compliance. Disorganized behavior noted and underlying psychosis appears to be impairing patient's judgment and behavior. Patient appears to be a danger to herself at this time due to patient unable to care for herself at this time. Patient would benefit form IPP for safety and stabilization.    Thoughts continue to be disorganized, illogical. Patient continues to present with paranoid delusions.  Continues to present with poor insight and judgement.  Denies depression and anxiety. Denies suicidal/homicidal ideations. Denies A/V hallucinations.    #Admit 2PC (9:27)    Plan:    #Schizophrenia  -Haldol 1mg BID PO  -TOO granted Haldol 1mg BID IM if patient refuses PO    #Tobacco Use Disorder  -Nicotine Gum PRN    -Haldol 2mg Q8 PRN for Severe agitation  -Lorazepam 1mg Q12 PRN for agitation  -Hydroxyzine 25mg Q12 PRN for anxiety    -Tylenol PRN for pain  -Pantoprazole for GERD  -Maalox PRN for dyspepsia    -cw 1:1 elopement risk

## 2020-08-21 NOTE — CHART NOTE - NSCHARTNOTEFT_GEN_A_CORE
Social Work Note:    Treatment team met with patient to discuss treatment plan, medications and discharge plan.  During the day patient is observed to be visible on unit but not attending groups and can be verbally aggressive towards staff. Pt is on a 1-1 due to elopement risk as she attempted to leave the unit. Patient continues to refuse all medication and vital signs. However pt is taking medication sporadically with her coffee but at times will even refuse that.     Pt has been able to make some reality based statements but will than wane back into delusional thoughts.     TOO still pending at this time      Mental Status Exam:    Mood – Euthymic    Sleep - Good  Appetite - Good  ADLs - Good  Thought Process – Delusional/Tangential    Observation –1-1 constant.

## 2020-08-22 PROCEDURE — 99231 SBSQ HOSP IP/OBS SF/LOW 25: CPT

## 2020-08-22 RX ADMIN — HALOPERIDOL DECANOATE 2 MILLIGRAM(S): 100 INJECTION INTRAMUSCULAR at 04:49

## 2020-08-22 RX ADMIN — HALOPERIDOL DECANOATE 1 MILLIGRAM(S): 100 INJECTION INTRAMUSCULAR at 08:42

## 2020-08-22 RX ADMIN — HALOPERIDOL DECANOATE 1 MILLIGRAM(S): 100 INJECTION INTRAMUSCULAR at 20:36

## 2020-08-22 NOTE — PROGRESS NOTE BEHAVIORAL HEALTH - NSBHFUPINTERVALHXFT_PSY_A_CORE
Pt seen and evaluated in team, chart reviewed. As per nursing report patient continues to be intermittently irritable, agitated, verbally and physically aggressive toward staff at times. 1:1 in place for safety/elopement risk. Pt reluctantly takes court-ordered meds; TOO granted to ensure medication compliance. P Patient continues to present with persecutory delusions, and states that "women were teaming up on me" in her residence. Continues to present with poor insight and judgement. Pt denies any complaints. Denies depression and anxiety. Denies suicidal/homicidal ideations. Denies A/V hallucinations. Patient visualized on the unit engaging with peers.

## 2020-08-22 NOTE — PROGRESS NOTE BEHAVIORAL HEALTH - SUMMARY
63 year old female , domiciled in nursing home, with history of COPD, DVT, bipolar, and dementia, multiple prior inpatient psych, was at [place] for 2 years, recently discharged from inpatient psych [facility] transferred to [facility], sent to the ER for continuous medication non-compliant for the past months, worsening psychosis and agitation.    Patient presents with decompensated psychiatric symptoms, secondary to medication non-compliance. Disorganized behavior noted and underlying psychosis appears to be impairing patient's judgment and behavior. Patient appears to be a danger to herself at this time due to patient unable to care for herself at this time. Patient would benefit form IPP for safety and stabilization.    Thoughts continue to be illogical. Patient continues to present with paranoid delusions.  Continues to present with poor insight and judgement.  Denies depression and anxiety. Denies suicidal/homicidal ideations. Denies A/V hallucinations.    #Admit 2PC (9:27)    Plan:    #Schizophrenia  -Haldol 1mg BID PO  -TOO granted Haldol 1mg BID IM if patient refuses PO    #Tobacco Use Disorder  -Nicotine Gum PRN    -Haldol 2mg Q8 PRN for Severe agitation  -Lorazepam 1mg Q12 PRN for agitation  -Hydroxyzine 25mg Q12 PRN for anxiety    -Tylenol PRN for pain  -Pantoprazole for GERD  -Maalox PRN for dyspepsia    -cw 1:1 elopement risk

## 2020-08-23 RX ADMIN — HALOPERIDOL DECANOATE 1 MILLIGRAM(S): 100 INJECTION INTRAMUSCULAR at 20:08

## 2020-08-23 RX ADMIN — Medication 1 MILLIGRAM(S): at 22:22

## 2020-08-23 RX ADMIN — HALOPERIDOL DECANOATE 1 MILLIGRAM(S): 100 INJECTION INTRAMUSCULAR at 08:18

## 2020-08-23 RX ADMIN — Medication 1 MILLIGRAM(S): at 00:03

## 2020-08-23 NOTE — PROGRESS NOTE BEHAVIORAL HEALTH - SUMMARY
63 year old female , domiciled in nursing home, with history of COPD, DVT, bipolar, and dementia, multiple prior inpatient psych, was at [place] for 2 years, recently discharged from inpatient psych [facility] transferred to [facility], sent to the ER for continuous medication non-compliant for the past months, worsening psychosis and agitation.    Patient presents with decompensated psychiatric symptoms, secondary to medication non-compliance. Disorganized behavior noted and underlying psychosis appears to be impairing patient's judgment and behavior. Patient appears to be a danger to herself at this time due to patient unable to care for herself at this time. Patient would benefit form IPP for safety and stabilization.    Thoughts continue to be illogical. Patient continues to present with paranoid delusions.  Continues to present with poor insight and judgement.  Denies depression and anxiety. Denies suicidal/homicidal ideations. Denies A/V hallucinations.    #Admit 2PC (9:27)    Plan:    #Schizophrenia  -Haldol 1mg BID PO  -TOO granted Haldol 1mg BID IM if patient refuses PO    #Tobacco Use Disorder  -Nicotine Gum PRN    -Haldol 2mg Q8 PRN for Severe agitation  -Lorazepam 1mg Q12 PRN for agitation  -Hydroxyzine 25mg Q12 PRN for anxiety    -Tylenol PRN for pain  -Pantoprazole for GERD  -Maalox PRN for dyspepsia    -cw 1:1 elopement risk

## 2020-08-23 NOTE — PROGRESS NOTE BEHAVIORAL HEALTH - NSBHFUPINTERVALHXFT_PSY_A_CORE
Pt seen and evaluated in team, chart reviewed. As per nursing report patient continues to be intermittently irritable, agitated, verbally and physically aggressive toward staff at times. 1:1 in place for safety/elopement risk. Pt reluctantly takes court-ordered meds; TOO granted to ensure medication compliance. P Patient continues to present with persecutory delusions, and states that "women were teaming up on me" in her residence. Continues to present with poor insight and judgement. Pt denies any complaints. Denies depression and anxiety. Denies suicidal/homicidal ideations. Denies A/V hallucinations. Patient visualized on the unit engaging with peers.   no acute event overnight

## 2020-08-23 NOTE — PROGRESS NOTE BEHAVIORAL HEALTH - NSBHCHARTREVIEWVS_PSY_A_CORE FT
Vital Signs Last 24 Hrs  T(C): 36.7 (23 Aug 2020 06:20), Max: 36.7 (23 Aug 2020 06:20)  T(F): 98.1 (23 Aug 2020 06:20), Max: 98.1 (23 Aug 2020 06:20)  HR: 81 (23 Aug 2020 06:20) (81 - 81)  BP: 123/59 (23 Aug 2020 06:20) (123/59 - 123/59)  BP(mean): --  RR: 20 (23 Aug 2020 06:20) (20 - 20)  SpO2: --

## 2020-08-24 PROCEDURE — 99231 SBSQ HOSP IP/OBS SF/LOW 25: CPT

## 2020-08-24 RX ADMIN — HALOPERIDOL DECANOATE 1 MILLIGRAM(S): 100 INJECTION INTRAMUSCULAR at 08:29

## 2020-08-24 RX ADMIN — HALOPERIDOL DECANOATE 1 MILLIGRAM(S): 100 INJECTION INTRAMUSCULAR at 20:03

## 2020-08-24 NOTE — CHART NOTE - NSCHARTNOTEFT_GEN_A_CORE
Social Work Note:    Treatment team met with patient to discuss treatment plan, medications and discharge plan.  During the day patient is observed to be visible on unit but not attending groups and can be verbally aggressive towards staff. Pt is on a 1-1 due to elopement risk as she attempted to leave the unit. Pt has been taking medication P.O and at times requesting PRN to address anxiety or sleep issues.    Pt has been able to make some reality based statements but will than wane back into delusional thoughts.     TOO was approved.       Mental Status Exam:    Mood – Euthymic    Sleep - Good  Appetite - Good  ADLs - Good  Thought Process – Delusional/Tangential    Observation –1-1 constant.

## 2020-08-24 NOTE — PROGRESS NOTE BEHAVIORAL HEALTH - SUMMARY
63 year old female , domiciled in nursing home, with history of COPD, DVT, bipolar, and dementia, multiple prior inpatient psych, was at [place] for 2 years, recently discharged from inpatient psych [facility] transferred to [facility], sent to the ER for continuous medication non-compliant for the past months, worsening psychosis and agitation.    Patient presents with decompensated psychiatric symptoms, secondary to medication non-compliance. Disorganized behavior noted and underlying psychosis appears to be impairing patient's judgment and behavior. Patient appears to be a danger to herself at this time due to patient unable to care for herself at this time. Patient would benefit form IPP for safety and stabilization.    Thoughts continue to be disorganized, illogical. Patient continues to present with paranoid delusions.  Continues to present with poor insight and judgement.  Denies depression and anxiety. Denies suicidal/homicidal ideations. Denies A/V hallucinations.    #Admit 2PC (9:27)    Plan:    #Schizophrenia  -Haldol 1mg BID PO  -TOO granted Haldol 1mg BID IM if patient refuses PO    #Tobacco Use Disorder  -Nicotine Gum PRN    -Haldol 2mg Q8 PRN for Severe agitation  -Lorazepam 1mg Q12 PRN for agitation  -Hydroxyzine 25mg Q12 PRN for anxiety    -Tylenol PRN for pain  -Pantoprazole for GERD  -Maalox PRN for dyspepsia    -cw 1:1 elopement risk

## 2020-08-24 NOTE — PROGRESS NOTE BEHAVIORAL HEALTH - NSBHFUPINTERVALHXFT_PSY_A_CORE
Pt seen and evaluated, chart reviewed. As per nursing report patient continues to be intermittently irritable, agitated, verbally and physically aggressive toward staff at times. 1:1 in place for safety/elopement risk. Reluctantly takes po meds with encouragement. TOO granted to ensure medication compliance. Patient continues to have impaired concentration. Thoughts continue to be disorganized and illogical. Patient continues to present with paranoid delusions. Continues to present with poor insight and judgement. Denies depression and anxiety. Denies suicidal/homicidal ideations. Denies A/V hallucinations. Patient visualized on the unit engaging with peers.

## 2020-08-25 PROCEDURE — 99231 SBSQ HOSP IP/OBS SF/LOW 25: CPT

## 2020-08-25 RX ORDER — HALOPERIDOL DECANOATE 100 MG/ML
2 INJECTION INTRAMUSCULAR ONCE
Refills: 0 | Status: COMPLETED | OUTPATIENT
Start: 2020-08-25 | End: 2020-08-25

## 2020-08-25 RX ADMIN — HALOPERIDOL DECANOATE 1 MILLIGRAM(S): 100 INJECTION INTRAMUSCULAR at 20:12

## 2020-08-25 RX ADMIN — HALOPERIDOL DECANOATE 1 MILLIGRAM(S): 100 INJECTION INTRAMUSCULAR at 08:05

## 2020-08-25 RX ADMIN — HALOPERIDOL DECANOATE 2 MILLIGRAM(S): 100 INJECTION INTRAMUSCULAR at 02:09

## 2020-08-25 RX ADMIN — Medication 1 MILLIGRAM(S): at 02:10

## 2020-08-25 NOTE — PROGRESS NOTE BEHAVIORAL HEALTH - SUMMARY
63 year old female , domiciled in nursing home, with history of COPD, DVT, bipolar, and dementia, multiple prior inpatient psych, was at [place] for 2 years, recently discharged from inpatient psych [facility] transferred to [facility], sent to the ER for continuous medication non-compliant for the past months, worsening psychosis and agitation.    Patient presents with decompensated psychiatric symptoms, secondary to medication non-compliance. Disorganized behavior noted and underlying psychosis appears to be impairing patient's judgment and behavior. Patient appears to be a danger to herself at this time due to patient unable to care for herself at this time. Patient would benefit form IPP for safety and stabilization.    Thoughts continue to be disorganized, illogical. Patient continues to present with paranoid delusions.  Continues to present with poor insight and judgement.  Denies depression and anxiety. Denies suicidal/homicidal ideations. Denies A/V hallucinations.    #Admit 2PC (9:27)    Plan:    #Schizophrenia  -Haldol 1mg BID PO  -TOO granted Haldol 1mg BID IM if patient refuses PO    #Tobacco Use Disorder  -Nicotine Gum PRN    -Haldol 2mg Q8 PRN for Severe agitation  -Lorazepam 2mg Q12 PRN for agitation  -Hydroxyzine 25mg Q12 PRN for anxiety    -Tylenol PRN for pain  -Pantoprazole for GERD  -Maalox PRN for dyspepsia    -cw 1:1 elopement risk

## 2020-08-25 NOTE — PROGRESS NOTE BEHAVIORAL HEALTH - NSBHFUPINTERVALHXFT_PSY_A_CORE
Pt seen and evaluated, chart reviewed. As per nursing report patient continues to be intermittently irritable, agitated, verbally and physically aggressive toward staff at times requiring PRN's. Periodically cheeking meds. Refused vitals this morning. 1:1 in place for safety/elopement risk. Reluctantly takes po meds with encouragement. TOO granted to ensure medication compliance. Patient continues to have impaired concentration. Thoughts continue to be disorganized and illogical. Patient continues to present with paranoid delusions. Continues to present with poor insight and judgement. Denies depression and anxiety. Denies suicidal/homicidal ideations. Denies A/V hallucinations. Patient visualized on the unit engaging with peers.

## 2020-08-26 PROCEDURE — 99231 SBSQ HOSP IP/OBS SF/LOW 25: CPT

## 2020-08-26 RX ADMIN — HALOPERIDOL DECANOATE 1 MILLIGRAM(S): 100 INJECTION INTRAMUSCULAR at 08:44

## 2020-08-26 RX ADMIN — HALOPERIDOL DECANOATE 1 MILLIGRAM(S): 100 INJECTION INTRAMUSCULAR at 20:27

## 2020-08-26 RX ADMIN — Medication 2 MILLIGRAM(S): at 04:59

## 2020-08-26 RX ADMIN — HALOPERIDOL DECANOATE 2 MILLIGRAM(S): 100 INJECTION INTRAMUSCULAR at 05:00

## 2020-08-26 NOTE — PROGRESS NOTE BEHAVIORAL HEALTH - SUMMARY
63 year old female , domiciled in nursing home, with history of COPD, DVT, bipolar, and dementia, multiple prior inpatient psych, was at [place] for 2 years, recently discharged from inpatient psych [facility] transferred to [facility], sent to the ER for continuous medication non-compliant for the past months, worsening psychosis and agitation.    Patient presents with decompensated psychiatric symptoms, secondary to medication non-compliance. Disorganized behavior noted and underlying psychosis appears to be impairing patient's judgment and behavior. Patient appears to be a danger to herself at this time due to patient unable to care for herself at this time. Patient would benefit form IPP for safety and stabilization.    Thoughts continue to be disorganized, illogical. Patient continues to present with paranoid delusions.  Continues to present with poor insight and judgement.  Denies depression and anxiety. Denies suicidal/homicidal ideations. Denies A/V hallucinations.    #Admit 2PC (9:27)    Plan:    #Schizophrenia  -Haldol 1mg BID PO  -TOO granted Haldol 1mg BID IM if patient refuses PO    #Tobacco Use Disorder  -Nicotine Gum PRN    -Haldol 2mg Q8 PRN for Severe agitation  -Lorazepam 2mg Q12 PRN for agitation  -Hydroxyzine 25mg Q12 PRN for anxiety    -Tylenol PRN for pain  -Pantoprazole for GERD  -Maalox PRN for dyspepsia    -cw 1:1 elopement risk

## 2020-08-26 NOTE — PROGRESS NOTE BEHAVIORAL HEALTH - NSBHFUPINTERVALHXFT_PSY_A_CORE
Pt seen and evaluated, chart reviewed. As per nursing report patient continues to be intermittently irritable, agitated, verbally and physically aggressive toward staff at times requiring PRN's. Periodically refusing vitals. 1:1 in place for safety/elopement risk. Reluctantly takes po meds with encouragement. TOO granted to ensure medication compliance. Patient continues to have impaired concentration. Thoughts continue to be disorganized and illogical. Patient continues to present with paranoid delusions. Continues to present with poor insight and judgement. Denies depression and anxiety. Denies suicidal/homicidal ideations. Denies A/V hallucinations. Patient visualized on the unit engaging with peers.

## 2020-08-26 NOTE — PROGRESS NOTE BEHAVIORAL HEALTH - NSBHCHARTREVIEWVS_PSY_A_CORE FT
Vital Signs Last 24 Hrs  T(C): --  T(F): --  HR: 78 (26 Aug 2020 06:04) (78 - 78)  BP: 150/86 (26 Aug 2020 06:04) (150/86 - 150/86)  BP(mean): --  RR: 16 (26 Aug 2020 06:04) (16 - 16)  SpO2: --

## 2020-08-27 LAB
A1C WITH ESTIMATED AVERAGE GLUCOSE RESULT: 5.5 % — SIGNIFICANT CHANGE UP (ref 4–5.6)
ALBUMIN SERPL ELPH-MCNC: 4.3 G/DL — SIGNIFICANT CHANGE UP (ref 3.5–5.2)
ALP SERPL-CCNC: 83 U/L — SIGNIFICANT CHANGE UP (ref 30–115)
ALT FLD-CCNC: 9 U/L — SIGNIFICANT CHANGE UP (ref 0–41)
ANION GAP SERPL CALC-SCNC: 10 MMOL/L — SIGNIFICANT CHANGE UP (ref 7–14)
AST SERPL-CCNC: 13 U/L — SIGNIFICANT CHANGE UP (ref 0–41)
BASOPHILS # BLD AUTO: 0.06 K/UL — SIGNIFICANT CHANGE UP (ref 0–0.2)
BASOPHILS NFR BLD AUTO: 0.5 % — SIGNIFICANT CHANGE UP (ref 0–1)
BILIRUB SERPL-MCNC: <0.2 MG/DL — SIGNIFICANT CHANGE UP (ref 0.2–1.2)
BUN SERPL-MCNC: 17 MG/DL — SIGNIFICANT CHANGE UP (ref 10–20)
CALCIUM SERPL-MCNC: 10.5 MG/DL — HIGH (ref 8.5–10.1)
CHLORIDE SERPL-SCNC: 101 MMOL/L — SIGNIFICANT CHANGE UP (ref 98–110)
CHOLEST SERPL-MCNC: 222 MG/DL — HIGH (ref 100–200)
CO2 SERPL-SCNC: 31 MMOL/L — SIGNIFICANT CHANGE UP (ref 17–32)
CREAT SERPL-MCNC: 0.8 MG/DL — SIGNIFICANT CHANGE UP (ref 0.7–1.5)
EOSINOPHIL # BLD AUTO: 0.35 K/UL — SIGNIFICANT CHANGE UP (ref 0–0.7)
EOSINOPHIL NFR BLD AUTO: 2.7 % — SIGNIFICANT CHANGE UP (ref 0–8)
ESTIMATED AVERAGE GLUCOSE: 111 MG/DL — SIGNIFICANT CHANGE UP (ref 68–114)
GLUCOSE SERPL-MCNC: 87 MG/DL — SIGNIFICANT CHANGE UP (ref 70–99)
HCT VFR BLD CALC: 43.8 % — SIGNIFICANT CHANGE UP (ref 37–47)
HDLC SERPL-MCNC: 62 MG/DL — SIGNIFICANT CHANGE UP
HGB BLD-MCNC: 14.1 G/DL — SIGNIFICANT CHANGE UP (ref 12–16)
IMM GRANULOCYTES NFR BLD AUTO: 0.3 % — SIGNIFICANT CHANGE UP (ref 0.1–0.3)
LIPID PNL WITH DIRECT LDL SERPL: 142 MG/DL — HIGH (ref 4–129)
LYMPHOCYTES # BLD AUTO: 35.9 % — SIGNIFICANT CHANGE UP (ref 20.5–51.1)
LYMPHOCYTES # BLD AUTO: 4.69 K/UL — HIGH (ref 1.2–3.4)
MCHC RBC-ENTMCNC: 28.4 PG — SIGNIFICANT CHANGE UP (ref 27–31)
MCHC RBC-ENTMCNC: 32.2 G/DL — SIGNIFICANT CHANGE UP (ref 32–37)
MCV RBC AUTO: 88.1 FL — SIGNIFICANT CHANGE UP (ref 81–99)
MONOCYTES # BLD AUTO: 1.33 K/UL — HIGH (ref 0.1–0.6)
MONOCYTES NFR BLD AUTO: 10.2 % — HIGH (ref 1.7–9.3)
NEUTROPHILS # BLD AUTO: 6.61 K/UL — HIGH (ref 1.4–6.5)
NEUTROPHILS NFR BLD AUTO: 50.4 % — SIGNIFICANT CHANGE UP (ref 42.2–75.2)
NRBC # BLD: 0 /100 WBCS — SIGNIFICANT CHANGE UP (ref 0–0)
PLATELET # BLD AUTO: 206 K/UL — SIGNIFICANT CHANGE UP (ref 130–400)
POTASSIUM SERPL-MCNC: 5.8 MMOL/L — HIGH (ref 3.5–5)
POTASSIUM SERPL-SCNC: 5.8 MMOL/L — HIGH (ref 3.5–5)
PROT SERPL-MCNC: 6.5 G/DL — SIGNIFICANT CHANGE UP (ref 6–8)
RBC # BLD: 4.97 M/UL — SIGNIFICANT CHANGE UP (ref 4.2–5.4)
RBC # FLD: 13.4 % — SIGNIFICANT CHANGE UP (ref 11.5–14.5)
SODIUM SERPL-SCNC: 142 MMOL/L — SIGNIFICANT CHANGE UP (ref 135–146)
TOTAL CHOLESTEROL/HDL RATIO MEASUREMENT: 3.6 RATIO — LOW (ref 4–5.5)
TRIGL SERPL-MCNC: 205 MG/DL — HIGH (ref 10–149)
WBC # BLD: 13.08 K/UL — HIGH (ref 4.8–10.8)
WBC # FLD AUTO: 13.08 K/UL — HIGH (ref 4.8–10.8)

## 2020-08-27 PROCEDURE — 99231 SBSQ HOSP IP/OBS SF/LOW 25: CPT

## 2020-08-27 RX ORDER — HALOPERIDOL DECANOATE 100 MG/ML
2 INJECTION INTRAMUSCULAR ONCE
Refills: 0 | Status: COMPLETED | OUTPATIENT
Start: 2020-08-27 | End: 2020-08-27

## 2020-08-27 RX ADMIN — HALOPERIDOL DECANOATE 1 MILLIGRAM(S): 100 INJECTION INTRAMUSCULAR at 21:00

## 2020-08-27 RX ADMIN — HALOPERIDOL DECANOATE 1 MILLIGRAM(S): 100 INJECTION INTRAMUSCULAR at 08:08

## 2020-08-27 RX ADMIN — HALOPERIDOL DECANOATE 2 MILLIGRAM(S): 100 INJECTION INTRAMUSCULAR at 21:20

## 2020-08-27 NOTE — PROGRESS NOTE BEHAVIORAL HEALTH - SUMMARY
63 year old female , domiciled in nursing home, with history of COPD, DVT, bipolar, and dementia, multiple prior inpatient psych, was at [place] for 2 years, recently discharged from inpatient psych [facility] transferred to [facility], sent to the ER for continuous medication non-compliant for the past months, worsening psychosis and agitation.    Patient presents with decompensated psychiatric symptoms, secondary to medication non-compliance. Disorganized behavior noted and underlying psychosis appears to be impairing patient's judgment and behavior. Patient appears to be a danger to herself at this time due to patient unable to care for herself at this time. Patient would benefit form IPP for safety and stabilization.    Thoughts continue to be disorganized, illogical. Patient continues to present with paranoid delusions.  Continues to present with poor insight and judgement.  Denies depression and anxiety. Denies suicidal/homicidal ideations. Denies A/V hallucinations.    #Admit 2PC (9:27)    Plan:    #Schizophrenia  -Haldol 1mg BID PO  -TOO granted Haldol 1mg BID IM if patient refuses PO    #Tobacco Use Disorder  -Nicotine Gum PRN    -Haldol 2mg Q8 PRN for Severe agitation  -Lorazepam 2mg Q12 PRN for agitation  -Hydroxyzine 25mg Q12 PRN for anxiety    -Tylenol PRN for pain  -Pantoprazole for GERD  -Maalox PRN for dyspepsia    -cw 1:1 elopement risk

## 2020-08-27 NOTE — PROGRESS NOTE BEHAVIORAL HEALTH - NSBHFUPINTERVALHXFT_PSY_A_CORE
Pt seen and evaluated, chart reviewed. As per nursing report patient continues to be intermittently irritable, agitated, disruptive, verbally and physically aggressive toward staff at times requiring PRN's. Periodically refusing vitals. Refused labs this morning. 1:1 in place for safety/elopement risk. Reluctantly takes po meds with encouragement. TOO granted to ensure medication compliance. On evaluation patient irritable and intrusive. Patient continues to have impaired concentration. Thoughts continue to be disorganized and illogical. Patient continues to present with paranoid delusions. Continues to present with poor insight and judgement. Denies depression and anxiety. Denies suicidal/homicidal ideations. Denies A/V hallucinations. Patient visualized on the unit engaging with peers. Pt seen and evaluated, chart reviewed. As per nursing report patient continues to be intermittently irritable, agitated, disruptive, verbally and physically aggressive toward staff at times requiring PRN's. Periodically refusing vitals. Refused labs this morning. 1:1 in place for safety/elopement risk. Reluctantly takes po meds with encouragement. TOO granted to ensure medication compliance. On evaluation patient irritable and intrusive. Patient continues to have impaired concentration. Thoughts continue to be disorganized and illogical. Patient continues to present with paranoid delusions. Continues to present with poor insight and judgement. Denies depression and anxiety. Denies suicidal/homicidal ideations. Denies A/V hallucinations. Patient visualized on the unit engaging with peers.    Pt refusing court ordered labs; blood work obtained with manual hold without incident.    Placed call to patients daughter Hannah Frazier [phone]. Updated her on patients care, treatment and progress.

## 2020-08-28 LAB
ANION GAP SERPL CALC-SCNC: 12 MMOL/L — SIGNIFICANT CHANGE UP (ref 7–14)
BASOPHILS # BLD AUTO: 0.07 K/UL — SIGNIFICANT CHANGE UP (ref 0–0.2)
BASOPHILS NFR BLD AUTO: 0.5 % — SIGNIFICANT CHANGE UP (ref 0–1)
BUN SERPL-MCNC: 19 MG/DL — SIGNIFICANT CHANGE UP (ref 10–20)
CALCIUM SERPL-MCNC: 9.8 MG/DL — SIGNIFICANT CHANGE UP (ref 8.5–10.1)
CHLORIDE SERPL-SCNC: 101 MMOL/L — SIGNIFICANT CHANGE UP (ref 98–110)
CO2 SERPL-SCNC: 26 MMOL/L — SIGNIFICANT CHANGE UP (ref 17–32)
CREAT SERPL-MCNC: 0.7 MG/DL — SIGNIFICANT CHANGE UP (ref 0.7–1.5)
EOSINOPHIL # BLD AUTO: 0.4 K/UL — SIGNIFICANT CHANGE UP (ref 0–0.7)
EOSINOPHIL NFR BLD AUTO: 2.9 % — SIGNIFICANT CHANGE UP (ref 0–8)
GLUCOSE SERPL-MCNC: 104 MG/DL — HIGH (ref 70–99)
HCT VFR BLD CALC: 44.1 % — SIGNIFICANT CHANGE UP (ref 37–47)
HGB BLD-MCNC: 14.3 G/DL — SIGNIFICANT CHANGE UP (ref 12–16)
IMM GRANULOCYTES NFR BLD AUTO: 0.3 % — SIGNIFICANT CHANGE UP (ref 0.1–0.3)
LYMPHOCYTES # BLD AUTO: 36.1 % — SIGNIFICANT CHANGE UP (ref 20.5–51.1)
LYMPHOCYTES # BLD AUTO: 4.98 K/UL — HIGH (ref 1.2–3.4)
MCHC RBC-ENTMCNC: 28.6 PG — SIGNIFICANT CHANGE UP (ref 27–31)
MCHC RBC-ENTMCNC: 32.4 G/DL — SIGNIFICANT CHANGE UP (ref 32–37)
MCV RBC AUTO: 88.2 FL — SIGNIFICANT CHANGE UP (ref 81–99)
MONOCYTES # BLD AUTO: 1.36 K/UL — HIGH (ref 0.1–0.6)
MONOCYTES NFR BLD AUTO: 9.8 % — HIGH (ref 1.7–9.3)
NEUTROPHILS # BLD AUTO: 6.96 K/UL — HIGH (ref 1.4–6.5)
NEUTROPHILS NFR BLD AUTO: 50.4 % — SIGNIFICANT CHANGE UP (ref 42.2–75.2)
NRBC # BLD: 0 /100 WBCS — SIGNIFICANT CHANGE UP (ref 0–0)
PLATELET # BLD AUTO: 209 K/UL — SIGNIFICANT CHANGE UP (ref 130–400)
POTASSIUM SERPL-MCNC: 4.5 MMOL/L — SIGNIFICANT CHANGE UP (ref 3.5–5)
POTASSIUM SERPL-SCNC: 4.5 MMOL/L — SIGNIFICANT CHANGE UP (ref 3.5–5)
RBC # BLD: 5 M/UL — SIGNIFICANT CHANGE UP (ref 4.2–5.4)
RBC # FLD: 13.5 % — SIGNIFICANT CHANGE UP (ref 11.5–14.5)
SODIUM SERPL-SCNC: 139 MMOL/L — SIGNIFICANT CHANGE UP (ref 135–146)
TSH SERPL-MCNC: 0.74 UIU/ML — SIGNIFICANT CHANGE UP (ref 0.27–4.2)
WBC # BLD: 13.81 K/UL — HIGH (ref 4.8–10.8)
WBC # FLD AUTO: 13.81 K/UL — HIGH (ref 4.8–10.8)

## 2020-08-28 PROCEDURE — 99231 SBSQ HOSP IP/OBS SF/LOW 25: CPT

## 2020-08-28 RX ORDER — ATORVASTATIN CALCIUM 80 MG/1
10 TABLET, FILM COATED ORAL AT BEDTIME
Refills: 0 | Status: DISCONTINUED | OUTPATIENT
Start: 2020-08-28 | End: 2020-08-28

## 2020-08-28 RX ORDER — HALOPERIDOL DECANOATE 100 MG/ML
5 INJECTION INTRAMUSCULAR ONCE
Refills: 0 | Status: DISCONTINUED | OUTPATIENT
Start: 2020-08-28 | End: 2020-08-28

## 2020-08-28 RX ADMIN — HALOPERIDOL DECANOATE 1 MILLIGRAM(S): 100 INJECTION INTRAMUSCULAR at 20:48

## 2020-08-28 RX ADMIN — HALOPERIDOL DECANOATE 1 MILLIGRAM(S): 100 INJECTION INTRAMUSCULAR at 08:30

## 2020-08-28 NOTE — PROGRESS NOTE BEHAVIORAL HEALTH - SUMMARY
63 year old female , domiciled in nursing home, with history of COPD, DVT, bipolar, and dementia, multiple prior inpatient psych, was at [place] for 2 years, recently discharged from inpatient psych [facility] transferred to [facility], sent to the ER for continuous medication non-compliant for the past months, worsening psychosis and agitation.    Patient presents with decompensated psychiatric symptoms, secondary to medication non-compliance. Disorganized behavior noted and underlying psychosis appears to be impairing patient's judgment and behavior. Patient appears to be a danger to herself at this time due to patient unable to care for herself at this time. Patient would benefit form IPP for safety and stabilization.    Thoughts continue to be disorganized, illogical. Patient continues to present with paranoid delusions.  Continues to present with poor insight and judgement.  Denies depression and anxiety. Denies suicidal/homicidal ideations. Denies A/V hallucinations.    #Admit 2PC (9:27)    Plan:    #Schizophrenia  -Haldol 1mg BID PO  -TOO granted Haldol 1mg BID IM if patient refuses PO    #Tobacco Use Disorder  -Nicotine Gum PRN    -Haldol 2mg Q8 PRN for Severe agitation  -Lorazepam 2mg Q12 PRN for agitation  -Hydroxyzine 25mg Q12 PRN for anxiety    -Tylenol PRN for pain  -Pantoprazole for GERD  -Maalox PRN for dyspepsia    -cw 1:1 elopement risk

## 2020-08-28 NOTE — CHART NOTE - NSCHARTNOTEFT_GEN_A_CORE
Patient Lipid profile: mildly elevated numbres.  -case discussed with Dr Zimmerman: patient to follow up with PMD upon discharge for further management.  - Change to DASH diet

## 2020-08-28 NOTE — CHART NOTE - NSCHARTNOTEFT_GEN_A_CORE
Social Work Note:    Treatment team met with patient to discuss treatment plan, medications and discharge plan.  During the day patient is observed to be visible on unit but not attending groups and can be verbally aggressive towards staff. Pt is on a 1-1 due to elopement risk as she attempted to leave the unit. Pt has been taking medication P.O and at times requesting PRN to address anxiety or sleep issues.    Pt recently has become d/o at times and verbally aggressive. Pt still expressing grandiose delusions. TOO approved and pt has been taking medication VIA po but observed to be "cheeking" the medication at times. SW will continue to follow. Pt is still not stable for D/C at this time.     Mental Status Exam:    Mood – Euthymic    Sleep - Good  Appetite - Good  ADLs - Good  Thought Process – Delusional/Tangential    Observation –1-1 constant.

## 2020-08-28 NOTE — PROGRESS NOTE BEHAVIORAL HEALTH - NSBHFUPINTERVALHXFT_PSY_A_CORE
Pt seen and evaluated, chart reviewed. As per nursing report patient continues to be intermittently irritable, agitated, disruptive, verbally and physically aggressive toward staff at times requiring PRN's. Periodically refusing vitals, labs and EKG. Refused labs this morning. 1:1 in place for safety/elopement risk. Reluctantly takes po meds with encouragement. TOO granted to ensure medication compliance. On evaluation patient irritable and intrusive. Patient continues to have impaired concentration. Thoughts continue to be disorganized and illogical. Patient continues to present with paranoid delusions. Continues to present with poor insight and judgement. Denies depression and anxiety. Denies suicidal/homicidal ideations. Denies A/V hallucinations. Patient visualized on the unit engaging with peers.    Pt refusing court ordered labs; blood work obtained with manual hold without incident.    Placed call to patients daughter Hannah Frazier [phone]. Updated her on patients care, treatment and progress. Pt seen and evaluated, chart reviewed. As per nursing report patient continues to be intermittently irritable, agitated, disruptive, verbally and physically aggressive toward staff at times requiring PRN's. Periodically refusing vitals, labs and EKG.  1:1 in place for safety/elopement risk. Reluctantly takes po meds with encouragement. TOO granted to ensure medication compliance. On evaluation patient irritable and intrusive. Patient continues to have impaired concentration. Thoughts continue to be disorganized and illogical. Patient continues to present with paranoid delusions. Continues to present with poor insight and judgement. Denies depression and anxiety. Denies suicidal/homicidal ideations. Denies A/V hallucinations. Patient visualized on the unit engaging with peers.    Labs drawn yesterday. PA made aware, reviewed labs: Patient Lipid profile: mildly elevated numbres.  -case discussed with Dr Zimmerman: patient to follow up with PMD upon discharge for further management.  - Change to DASH diet.    K+ 5.8,  prior K 4.3  -patient on no Renal offending agents  -Bun/Creat:  17/0.8    Plan: I attempted to redraw BMP  to verify K+ but patient very uncooperative, Refused.          - Change to no concentrated K+ diet          - Reattempt BMP for 3PM today    Discussed with Hospitalist    EKG attempted. Patient refused. Pt seen and evaluated in treatment team, chart reviewed. As per nursing report patient continues to be intermittently irritable, agitated, disruptive, verbally and physically aggressive toward staff at times requiring PRN's. Periodically refusing vitals, labs and EKG.  1:1 in place for safety/elopement risk. Reluctantly takes po meds with encouragement. TOO granted to ensure medication compliance. On evaluation patient irritable and intrusive. Patient continues to have impaired concentration. Thoughts continue to be disorganized and illogical. Patient continues to present with paranoid delusions. Continues to present with poor insight and judgement. Denies depression and anxiety. Denies suicidal/homicidal ideations. Denies A/V hallucinations. Patient visualized on the unit engaging with peers.    Labs drawn yesterday. PA made aware, reviewed labs: Patient Lipid profile: mildly elevated numbres.  -case discussed with Dr Zimmerman: patient to follow up with PMD upon discharge for further management.  - Change to DASH diet.    K+ 5.8,  prior K 4.3  -patient on no Renal offending agents  -Bun/Creat:  17/0.8    Plan: I attempted to redraw BMP  to verify K+ but patient very uncooperative, Refused.          - Change to no concentrated K+ diet          - Reattempt BMP for 3PM today    Discussed with Hospitalist    EKG attempted. Patient refused. Pt seen and evaluated in treatment team, chart reviewed. As per nursing report patient continues to be intermittently irritable, agitated, disruptive, verbally and physically aggressive toward staff at times requiring PRN's. Periodically refusing vitals, labs and EKG.  1:1 in place for safety/elopement risk. Reluctantly takes po meds with encouragement. TOO granted to ensure medication compliance. On evaluation patient irritable and intrusive. Patient continues to have impaired concentration. Thoughts continue to be disorganized and illogical. Patient continues to present with paranoid delusions. Continues to present with poor insight and judgement. Denies depression and anxiety. Denies suicidal/homicidal ideations. Denies A/V hallucinations. Patient visualized on the unit engaging with peers.    Labs drawn yesterday. PA made aware, reviewed labs: Patient Lipid profile: mildly elevated numbres.  -case discussed with Dr Zimmerman: patient to follow up with PMD upon discharge for further management.  - Change to DASH diet.    K+ 5.8,  prior K 4.3  -patient on no Renal offending agents  -Bun/Creat:  17/0.8    Plan: I attempted to redraw BMP  to verify K+ but patient very uncooperative, Refused.          - Change to no concentrated K+ diet          - Reattempt BMP for 3PM today    Discussed with Hospitalist    EKG attempted. Patient refused.    Repeat BMP drawn this afternoon. PA made aware. Pt seen and evaluated in treatment team, chart reviewed. As per nursing report patient continues to be intermittently irritable, agitated, disruptive, verbally and physically aggressive toward staff at times requiring PRN's. Periodically refusing vitals, labs and EKG.  1:1 in place for safety/elopement risk. Reluctantly takes po meds with encouragement. TOO granted to ensure medication compliance. On evaluation patient irritable and intrusive. Patient continues to have impaired concentration. Thoughts continue to be disorganized and illogical. Patient continues to present with paranoid delusions. Continues to present with poor insight and judgement. Denies depression and anxiety. Denies suicidal/homicidal ideations. Denies A/V hallucinations. Patient visualized on the unit engaging with peers.    Labs drawn yesterday. PA made aware, reviewed labs: Patient Lipid profile: mildly elevated numbres.  -case discussed with Dr Zimmerman: patient to follow up with PMD upon discharge for further management.  - Change to DASH diet.    K+ 5.8,  prior K 4.3  -patient on no Renal offending agents  -Bun/Creat:  17/0.8    Plan: I attempted to redraw BMP  to verify K+ but patient very uncooperative, Refused.          - Change to no concentrated K+ diet          - Reattempt BMP for 3PM today    Discussed with Hospitalist    EKG attempted. Patient refused.    This afternoon patient became agitated, verbally & physically abusive when pt was asked to have her blood drawn, RN  tried to explain reason for blood work but patient became more agitated, Patient attempted to hit & swung at writer. With a lot of encouragement from writer and RN patient verbally re-directable and blood was drawn before IM order given, pt calm as of this time, maintained on 1:1 sit.     Repeat BMP drawn this afternoon. PA made aware. Pt seen and evaluated in treatment team, chart reviewed. As per nursing report patient continues to be intermittently irritable, agitated, disruptive, verbally and physically aggressive toward staff at times requiring PRN's. Periodically refusing vitals, labs and EKG.  1:1 in place for safety/elopement risk. Reluctantly takes po meds with encouragement. TOO granted to ensure medication compliance. On evaluation patient irritable and intrusive. Patient continues to have impaired concentration. Thoughts continue to be disorganized and illogical. Patient continues to present with paranoid delusions. Continues to present with poor insight and judgement. Denies depression and anxiety. Denies suicidal/homicidal ideations. Denies A/V hallucinations. Patient visualized on the unit engaging with peers.    Labs drawn yesterday. PA made aware, reviewed labs: Patient Lipid profile: mildly elevated numbres.  -case discussed with Dr Zimmerman: patient to follow up with PMD upon discharge for further management.  - Change to DASH diet.    K+ 5.8,  prior K 4.3  -patient on no Renal offending agents  -Bun/Creat:  17/0.8    Plan: I attempted to redraw BMP  to verify K+ but patient very uncooperative, Refused.          - Change to no concentrated K+ diet          - Reattempt BMP for 3PM today    Discussed with Hospitalist    EKG attempted. Patient refused.    This afternoon patient became agitated, verbally & physically abusive, racially inappropriate when pt was asked to have her blood drawn, RN  tried to explain reason for blood work but patient became more agitated, Patient attempted to hit & swung at writer. With a lot of encouragement from writer and RN patient verbally re-directable and blood was drawn before IM order given, pt calm as of this time, maintained on 1:1 sit.     Repeat BMP drawn this afternoon. PA made aware.

## 2020-08-28 NOTE — PROGRESS NOTE BEHAVIORAL HEALTH - NSBHCHARTREVIEWLAB_PSY_A_CORE FT
Complete Blood Count + Automated Diff (07.31.20 @ 18:59)    WBC Count: 8.50 K/uL    RBC Count: 5.06 M/uL    Hemoglobin: 14.2 g/dL    Hematocrit: 45.0 %    Mean Cell Volume: 88.9 fL    Mean Cell Hemoglobin: 28.1 pg    Mean Cell Hemoglobin Conc: 31.6 g/dL    Red Cell Distrib Width: 13.9     Platelet Count - Automated: 176 K/uL    Auto Neutrophil #: 5.54 K/uL    Auto Lymphocyte #: 2.20 K/uL    Auto Monocyte #: 0.57 K/uL    Auto Eosinophil #: 0.13 K/uL    Auto Basophil #: 0.03 K/uL    Auto Neutrophil %: 65.1    Auto Lymphocyte %: 25.9     Auto Monocyte %: 6.7     Auto Eosinophil %: 1.5     Auto Basophil %: 0.4     Auto Immature Granulocyte %: 0.4     Nucleated RBC: 0 /100 WBCs  Comprehensive Metabolic Panel (07.31.20 @ 18:59)    Sodium, Serum: 143 mmol/L    Potassium, Serum: 4.3 mmol/L    Chloride, Serum: 101 mmol/L    Carbon Dioxide, Serum: 29 mmol/L    Anion Gap, Serum: 13 mmol/L    Blood Urea Nitrogen, Serum: 12 mg/dL    Creatinine, Serum: 0.7 mg/dL    Glucose, Serum: 139 mg/dL    Calcium, Total Serum: 9.7 mg/dL    Protein Total, Serum: 6.4 g/dL    Albumin, Serum: 4.4 g/dL    Bilirubin Total, Serum: 0.6 mg/dL    Alkaline Phosphatase, Serum: 75 U/L    Aspartate Aminotransferase (AST/SGOT): 12 U/L    Alanine Aminotransferase (ALT/SGPT): 8 U/L    eGFR if Non : 90: Comprehensive Metabolic Panel in AM (08.27.20 @ 11:30)    Sodium, Serum: 142 mmol/L    Potassium, Serum: 5.8 mmol/L    Chloride, Serum: 101 mmol/L    Carbon Dioxide, Serum: 31 mmol/L    Anion Gap, Serum: 10 mmol/L    Blood Urea Nitrogen, Serum: 17 mg/dL    Creatinine, Serum: 0.8 mg/dL    Glucose, Serum: 87 mg/dL    Calcium, Total Serum: 10.5 mg/dL    Protein Total, Serum: 6.5 g/dL    Albumin, Serum: 4.3 g/dL    Bilirubin Total, Serum: <0.2 mg/dL    Alkaline Phosphatase, Serum: 83 U/L    Aspartate Aminotransferase (AST/SGOT): 13 U/L    Alanine Aminotransferase (ALT/SGPT): 9 U/L    eGFR if Non : 76  Complete Blood Count + Automated Diff in AM (08.27.20 @ 11:30)    WBC Count: 13.08 K/uL    RBC Count: 4.97 M/uL    Hemoglobin: 14.1 g/dL    Hematocrit: 43.8     Mean Cell Volume: 88.1 fL    Mean Cell Hemoglobin: 28.4 pg    Mean Cell Hemoglobin Conc: 32.2 g/dL    Red Cell Distrib Width: 13.4     Platelet Count - Automated: 206 K/uL    Auto Neutrophil #: 6.61 K/uL    Auto Lymphocyte #: 4.69 K/uL    Auto Monocyte #: 1.33 K/uL    Auto Eosinophil #: 0.35 K/uL    Auto Basophil #: 0.06 K/uL    Auto Neutrophil %: 50.4: Differential percentages must be correlated with absolute numbers for  clinical significance.     Auto Lymphocyte %: 35.9     Auto Monocyte %: 10.2     Auto Eosinophil %: 2.7     Auto Basophil %: 0.5     Auto Immature Granulocyte %: 0.3     Nucleated RBC: 0 /100 WBCs  Lipid Profile in AM (08.27.20 @ 11:30)    Total Cholesterol/HDL Ratio Measurement: 3.6 Ratio    Cholesterol, Serum: 222 mg/dL    Triglycerides, Serum: 205 mg/dL    HDL Cholesterol, Serum: 62  Effective 08/15/2018: New reference range and interpretive comment. mg/dL    Direct LDL: 142  A1C with Estimated Average Glucose in AM (08.27.20 @ 11:30)    A1C with Estimated Average Glucose Result: 5.5    Estimated Average Glucose: 111

## 2020-08-28 NOTE — PROGRESS NOTE BEHAVIORAL HEALTH - NSBHCHARTREVIEWVS_PSY_A_CORE FT
Vital Signs Last 24 Hrs Patient refused  T(C): --  T(F): --  HR: --  BP: --  BP(mean): --  RR: --  SpO2: -- Vital Signs Last 24 Hrs  T(C): 36.2 (28 Aug 2020 09:53), Max: 36.2 (28 Aug 2020 06:00)  T(F): 97.1 (28 Aug 2020 09:53), Max: 97.2 (28 Aug 2020 06:00)  HR: 79 (28 Aug 2020 09:53) (79 - 84)  BP: 123/69 (28 Aug 2020 09:53) (123/69 - 129/54)  BP(mean): --  RR: 18 (28 Aug 2020 09:53) (18 - 18)  SpO2: --

## 2020-08-29 RX ADMIN — HALOPERIDOL DECANOATE 1 MILLIGRAM(S): 100 INJECTION INTRAMUSCULAR at 20:16

## 2020-08-29 RX ADMIN — HALOPERIDOL DECANOATE 1 MILLIGRAM(S): 100 INJECTION INTRAMUSCULAR at 08:25

## 2020-08-29 RX ADMIN — Medication 2 MILLIGRAM(S): at 01:09

## 2020-08-29 NOTE — PROGRESS NOTE BEHAVIORAL HEALTH - NSBHCHARTREVIEWLAB_PSY_A_CORE FT
14.3   13.81 )-----------( 209      ( 28 Aug 2020 15:44 )             44.1   08-28    139  |  101  |  19  ----------------------------<  104<H>  4.5   |  26  |  0.7    Ca    9.8      28 Aug 2020 15:44

## 2020-08-29 NOTE — PROGRESS NOTE BEHAVIORAL HEALTH - NSBHFUPINTERVALHXFT_PSY_A_CORE
Patient seen and evaluated. No acute events overnight. Patient remains on 1:1 for elopement risk. Patient reports feeling well and anticipates being visited by her daughter today.

## 2020-08-29 NOTE — PROGRESS NOTE BEHAVIORAL HEALTH - NSBHCHARTREVIEWVS_PSY_A_CORE FT
Vital Signs Last 24 Hrs  T(C): 36.4 (29 Aug 2020 08:26), Max: 36.4 (29 Aug 2020 08:26)  T(F): 97.6 (29 Aug 2020 08:26), Max: 97.6 (29 Aug 2020 08:26)  HR: 103 (29 Aug 2020 08:26) (94 - 103)  BP: 137/94 (29 Aug 2020 08:26) (137/94 - 139/72)  BP(mean): --  RR: 18 (29 Aug 2020 08:26) (16 - 18)  SpO2: 94% (28 Aug 2020 23:51) (94% - 94%)

## 2020-08-30 RX ADMIN — Medication 2 MILLIGRAM(S): at 03:00

## 2020-08-30 RX ADMIN — HALOPERIDOL DECANOATE 1 MILLIGRAM(S): 100 INJECTION INTRAMUSCULAR at 21:05

## 2020-08-30 RX ADMIN — HALOPERIDOL DECANOATE 1 MILLIGRAM(S): 100 INJECTION INTRAMUSCULAR at 08:59

## 2020-08-30 NOTE — PROGRESS NOTE BEHAVIORAL HEALTH - NSBHCHARTREVIEWVS_PSY_A_CORE FT
Vital Signs Last 24 Hrs  T(C): 36.8 (30 Aug 2020 08:37), Max: 36.8 (30 Aug 2020 08:37)  T(F): 98.3 (30 Aug 2020 08:37), Max: 98.3 (30 Aug 2020 08:37)  HR: 126 (30 Aug 2020 08:37) (126 - 126)  BP: 129/88 (30 Aug 2020 08:37) (129/88 - 129/88)  BP(mean): --  RR: 16 (30 Aug 2020 08:37) (16 - 16)  SpO2: --

## 2020-08-30 NOTE — PROGRESS NOTE BEHAVIORAL HEALTH - SUMMARY
63 year old female , domiciled in nursing home, with history of COPD, DVT, bipolar, and dementia, multiple prior inpatient psych, was at [place] for 2 years, recently discharged from inpatient psych [facility] transferred to [facility], sent to the ER for continuous medication non-compliant for the past months, worsening psychosis and agitation.    Patient presents with decompensated psychiatric symptoms, secondary to medication non-compliance. Disorganized behavior noted and underlying psychosis appears to be impairing patient's judgment and behavior. Patient appears to be a danger to herself at this time due to patient unable to care for herself at this time. Patient would benefit form IPP for safety and stabilization.    Thoughts continue to be disorganized, illogical. Patient continues to present with paranoid delusions.  Continues to present with poor insight and judgement.  Denies depression and anxiety. Denies suicidal/homicidal ideations. Denies A/V hallucinations.    #Admit 2PC (9:27)    Plan:    #Schizophrenia  -Haldol 1mg BID PO  -TOO granted Haldol 1mg BID IM if patient refuses PO    #Tobacco Use Disorder  -Nicotine Gum PRN    -Haldol 2mg Q8 PRN for Severe agitation  -Lorazepam 2mg Q12 PRN for agitation  -Hydroxyzine 25mg Q12 PRN for anxiety    -Tylenol PRN for pain  -Pantoprazole for GERD  -Maalox PRN for dyspepsia    -cw 1:1 elopement risk

## 2020-08-31 PROCEDURE — 99231 SBSQ HOSP IP/OBS SF/LOW 25: CPT

## 2020-08-31 RX ORDER — HALOPERIDOL DECANOATE 100 MG/ML
2 INJECTION INTRAMUSCULAR EVERY 12 HOURS
Refills: 0 | Status: DISCONTINUED | OUTPATIENT
Start: 2020-08-31 | End: 2020-09-08

## 2020-08-31 RX ADMIN — Medication 2 MILLIGRAM(S): at 00:26

## 2020-08-31 RX ADMIN — HALOPERIDOL DECANOATE 2 MILLIGRAM(S): 100 INJECTION INTRAMUSCULAR at 20:01

## 2020-08-31 RX ADMIN — HALOPERIDOL DECANOATE 1 MILLIGRAM(S): 100 INJECTION INTRAMUSCULAR at 08:19

## 2020-08-31 RX ADMIN — Medication 25 MILLIGRAM(S): at 08:19

## 2020-08-31 NOTE — PROGRESS NOTE BEHAVIORAL HEALTH - SUMMARY
63 year old female , domiciled in nursing home, with history of COPD, DVT, bipolar, and dementia, multiple prior inpatient psych, was at [place] for 2 years, recently discharged from inpatient psych [facility] transferred to [facility], sent to the ER for continuous medication non-compliant for the past months, worsening psychosis and agitation.    Patient presents with decompensated psychiatric symptoms, secondary to medication non-compliance. Disorganized behavior noted and underlying psychosis appears to be impairing patient's judgment and behavior. Patient appears to be a danger to herself at this time due to patient unable to care for herself at this time. Patient would benefit form IPP for safety and stabilization.    Thoughts continue to be disorganized, illogical. Patient continues to present with paranoid delusions.  Continues to present with poor insight and judgement.  Denies depression and anxiety. Denies suicidal/homicidal ideations. Denies A/V hallucinations.    #Admit 2PC (9:27)    Plan:    #Schizophrenia  - Increased Haldol to 2mg BID PO  -TOO in place Haldol 2mg BID IM if patient refuses PO    #Tobacco Use Disorder  -Nicotine Gum PRN    -Haldol 2mg Q8 PRN for Severe agitation  -Lorazepam 2mg Q12 PRN for agitation  -Hydroxyzine 25mg Q12 PRN for anxiety    -Tylenol PRN for pain  -Pantoprazole for GERD  -Maalox PRN for dyspepsia    -cw 1:1 elopement risk 63 year old female , domiciled in nursing home, with history of COPD, DVT, bipolar, and dementia, multiple prior inpatient psych, was at [place] for 2 years, recently discharged from inpatient psych [facility] transferred to [facility], sent to the ER for continuous medication non-compliant for the past months, worsening psychosis and agitation.    Patient presents with decompensated psychiatric symptoms, secondary to medication non-compliance. Disorganized behavior noted and underlying psychosis appears to be impairing patient's judgment and behavior. Patient appears to be a danger to herself at this time due to patient unable to care for herself at this time. Patient would benefit form IPP for safety and stabilization.    Thoughts continue to be disorganized, illogical. Patient continues to present with paranoid delusions.  Continues to present with poor insight and judgement.  Denies depression and anxiety. Denies suicidal/homicidal ideations. Denies A/V hallucinations.    #Admit 2PC (9:27)    Plan:    #Schizophrenia  - Increase Haldol to 2mg BID PO  -TOO in place Haldol 2mg BID IM if patient refuses PO    #Tobacco Use Disorder  -Nicotine Gum PRN    -Haldol 2mg Q8 PRN for Severe agitation  -Lorazepam 2mg Q12 PRN for agitation  -Hydroxyzine 25mg Q12 PRN for anxiety    -Tylenol PRN for pain  -Pantoprazole for GERD  -Maalox PRN for dyspepsia    -cw 1:1 elopement risk 63 year old female , domiciled in nursing home, with history of COPD, DVT, bipolar, and dementia, multiple prior inpatient psych, was at [place] for 2 years, recently discharged from inpatient psych [facility] transferred to [facility], sent to the ER for continuous medication non-compliant for the past months, worsening psychosis and agitation.    Patient presents with decompensated psychiatric symptoms, secondary to medication non-compliance. Disorganized behavior noted and underlying psychosis appears to be impairing patient's judgment and behavior. Patient appears to be a danger to herself at this time due to patient unable to care for herself at this time. Patient would benefit form IPP for safety and stabilization.    Patient continues to be intermittently irritable, agitated, disruptive, verbally and physically aggressive toward staff. Thoughts continue to be disorganized, illogical. Patient continues to present with paranoid delusions.  Continues to present with poor insight and judgement.  Denies depression and anxiety. Denies suicidal/homicidal ideations. Denies A/V hallucinations.    #Admit 2PC (9:27)    Plan:    #Schizophrenia  -Increase Haldol to 2mg BID PO  -TOO in place Haldol 2mg BID IM if patient refuses PO    #Tobacco Use Disorder  -Nicotine Gum PRN    -Haldol 2mg Q8 PRN for Severe agitation  -Lorazepam 2mg Q12 PRN for agitation  -Hydroxyzine 25mg Q12 PRN for anxiety    -Tylenol PRN for pain  -Pantoprazole for GERD  -Maalox PRN for dyspepsia    -cw 1:1 elopement risk

## 2020-08-31 NOTE — CHART NOTE - NSCHARTNOTEFT_GEN_A_CORE
Social Work Note:    Treatment team meets with patient to discuss treatment plan, medications and discharge plan.  During the day patient is observed to be visible on the unit and engaging with peers.  Mood is labile and she has been physically aggressive toward staff.  Patient remains on a 1:1 for safety and for elopement risk.  Suicidal / homicidal ideation denied.  Audio / visual hallucinations denied.  Patient frequently needs redirection as she is disorganized.      Prior to admission patient was a resident of [facility].  Plan is for patient to return to this level of care when stable.  When appropriate a NICANOR will be sent to facility.      Mental Status Exam:    Mood – Labile   Sleep - Fair  Appetite - Good  ADLs - Poor  Thought Process – Disorganized / Illogical   Observation – 1:1     No barriers to discharge identified at this time.     At this time patient is not psychiatrically stable for discharge.
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
## 2020-08-31 NOTE — PROGRESS NOTE BEHAVIORAL HEALTH - NSBHCHARTREVIEWLAB_PSY_A_CORE FT
Comprehensive Metabolic Panel in AM (08.27.20 @ 11:30)    Sodium, Serum: 142 mmol/L    Potassium, Serum: 5.8 mmol/L    Chloride, Serum: 101 mmol/L    Carbon Dioxide, Serum: 31 mmol/L    Anion Gap, Serum: 10 mmol/L    Blood Urea Nitrogen, Serum: 17 mg/dL    Creatinine, Serum: 0.8 mg/dL    Glucose, Serum: 87 mg/dL    Calcium, Total Serum: 10.5 mg/dL    Protein Total, Serum: 6.5 g/dL    Albumin, Serum: 4.3 g/dL    Bilirubin Total, Serum: <0.2 mg/dL    Alkaline Phosphatase, Serum: 83 U/L    Aspartate Aminotransferase (AST/SGOT): 13 U/L    Alanine Aminotransferase (ALT/SGPT): 9 U/L    eGFR if Non : 76  Complete Blood Count + Automated Diff in AM (08.27.20 @ 11:30)    WBC Count: 13.08 K/uL    RBC Count: 4.97 M/uL    Hemoglobin: 14.1 g/dL    Hematocrit: 43.8     Mean Cell Volume: 88.1 fL    Mean Cell Hemoglobin: 28.4 pg    Mean Cell Hemoglobin Conc: 32.2 g/dL    Red Cell Distrib Width: 13.4     Platelet Count - Automated: 206 K/uL    Auto Neutrophil #: 6.61 K/uL    Auto Lymphocyte #: 4.69 K/uL    Auto Monocyte #: 1.33 K/uL    Auto Eosinophil #: 0.35 K/uL    Auto Basophil #: 0.06 K/uL    Auto Neutrophil %: 50.4: Differential percentages must be correlated with absolute numbers for  clinical significance.     Auto Lymphocyte %: 35.9     Auto Monocyte %: 10.2     Auto Eosinophil %: 2.7     Auto Basophil %: 0.5     Auto Immature Granulocyte %: 0.3     Nucleated RBC: 0 /100 WBCs  Lipid Profile in AM (08.27.20 @ 11:30)    Total Cholesterol/HDL Ratio Measurement: 3.6 Ratio    Cholesterol, Serum: 222 mg/dL    Triglycerides, Serum: 205 mg/dL    HDL Cholesterol, Serum: 62  Effective 08/15/2018: New reference range and interpretive comment. mg/dL    Direct LDL: 142  A1C with Estimated Average Glucose in AM (08.27.20 @ 11:30)    A1C with Estimated Average Glucose Result: 5.5    Estimated Average Glucose: 111

## 2020-09-01 PROCEDURE — 99231 SBSQ HOSP IP/OBS SF/LOW 25: CPT

## 2020-09-01 RX ORDER — ACETAMINOPHEN 500 MG
650 TABLET ORAL EVERY 4 HOURS
Refills: 0 | Status: DISCONTINUED | OUTPATIENT
Start: 2020-09-01 | End: 2020-10-01

## 2020-09-01 RX ORDER — PANTOPRAZOLE SODIUM 20 MG/1
40 TABLET, DELAYED RELEASE ORAL
Refills: 0 | Status: DISCONTINUED | OUTPATIENT
Start: 2020-09-01 | End: 2020-10-01

## 2020-09-01 RX ADMIN — HALOPERIDOL DECANOATE 2 MILLIGRAM(S): 100 INJECTION INTRAMUSCULAR at 20:10

## 2020-09-01 RX ADMIN — HALOPERIDOL DECANOATE 2 MILLIGRAM(S): 100 INJECTION INTRAMUSCULAR at 11:31

## 2020-09-01 NOTE — PROGRESS NOTE BEHAVIORAL HEALTH - SUMMARY
63 year old female , domiciled in nursing home, with history of COPD, DVT, bipolar, and dementia, multiple prior inpatient psych, was at [place] for 2 years, recently discharged from inpatient psych [facility] transferred to [facility], sent to the ER for continuous medication non-compliant for the past months, worsening psychosis and agitation.    Patient presents with decompensated psychiatric symptoms, secondary to medication non-compliance. Disorganized behavior noted and underlying psychosis appears to be impairing patient's judgment and behavior. Patient appears to be a danger to herself at this time due to patient unable to care for herself at this time. Patient would benefit form IPP for safety and stabilization.    Patient continues to be intermittently irritable, agitated, disruptive, verbally and physically aggressive toward staff. Thoughts continue to be disorganized, illogical. Patient continues to present with paranoid delusions.  Continues to present with poor insight and judgement.  Denies depression and anxiety. Denies suicidal/homicidal ideations. Denies A/V hallucinations.    #Admit 2PC (9:27)    Plan:    #Schizophrenia  -Increase Haldol to 2mg BID PO  -TOO in place Haldol 2mg BID IM if patient refuses PO    #Tobacco Use Disorder  -Nicotine Gum PRN    -Haldol 2mg Q8 PRN for Severe agitation  -Lorazepam 2mg Q12 PRN for agitation  -Hydroxyzine 25mg Q12 PRN for anxiety    -Tylenol PRN for pain  -Pantoprazole for GERD  -Maalox PRN for dyspepsia    -cw 1:1 elopement risk 63 year old female , domiciled in nursing home, with history of COPD, DVT, bipolar, and dementia, multiple prior inpatient psych, was at [place] for 2 years, recently discharged from inpatient psych [facility] transferred to [facility], sent to the ER for continuous medication non-compliant for the past months, worsening psychosis and agitation.    Patient presents with decompensated psychiatric symptoms, secondary to medication non-compliance. Disorganized behavior noted and underlying psychosis appears to be impairing patient's judgment and behavior. Patient appears to be a danger to herself at this time due to patient unable to care for herself at this time. Patient would benefit form IPP for safety and stabilization.    Patient continues to be intermittently irritable, agitated, disruptive, verbally and physically aggressive toward staff. Thoughts continue to be disorganized, illogical. Patient continues to present with paranoid delusions.  Continues to present with poor insight and judgement.  Denies depression and anxiety. Denies suicidal/homicidal ideations. Denies A/V hallucinations.    #Admit 2PC (9:27)    Plan:    #Schizophrenia  -Haldol 2mg BID PO  -TOO in place Haldol 2mg BID IM if patient refuses PO    #Tobacco Use Disorder  -Nicotine Gum PRN    -Haldol 2mg Q8 PRN for Severe agitation  -Lorazepam 2mg Q12 PRN for agitation  -Hydroxyzine 25mg Q12 PRN for anxiety    -Tylenol PRN for pain  -Pantoprazole for GERD  -Maalox PRN for dyspepsia    -cw 1:1 elopement risk

## 2020-09-01 NOTE — PROGRESS NOTE BEHAVIORAL HEALTH - NSBHFUPINTERVALHXFT_PSY_A_CORE
Pt seen and evaluated, chart reviewed. As per nursing report patient continues to be intermittently irritable, agitated, disruptive, verbally and physically aggressive toward staff. Patient had a better night last night. Periodically refusing vitals, labs and EKG. 1:1 in place for safety/elopement risk. Reluctantly takes po meds with encouragement. TOO in place to ensure medication compliance. On evaluation patient calmer today. Patient continues to have impaired concentration. Thoughts continue to be disorganized and illogical. Patient continues to present with paranoid delusions. Continues to present with poor insight and judgement. Denies depression and anxiety. Denies suicidal/homicidal ideations. Denies A/V hallucinations. Patient visualized on the unit engaging with peers. Pt seen and evaluated, chart reviewed. As per nursing report patient continues to be intermittently irritable, agitated, disruptive, verbally and physically aggressive toward staff. Patient had a better night last night. Periodically refusing vitals, labs and EKG. 1:1 in place for safety/elopement risk. Reluctantly takes po meds with encouragement. TOO in place to ensure medication compliance. On evaluation patient calmer today. Periods of irritability and agitation but was verbally re-directable. Patient continues to have impaired concentration. Thoughts continue to be disorganized and illogical. Patient continues to present with paranoid delusions. Continues to present with poor insight and judgement. Denies depression and anxiety. Denies suicidal/homicidal ideations. Denies A/V hallucinations. Patient visualized on the unit engaging with peers.

## 2020-09-01 NOTE — PROGRESS NOTE BEHAVIORAL HEALTH - NSBHCHARTREVIEWVS_PSY_A_CORE FT
Vital Signs Last 24 Hrs  T(C): 36 (01 Sep 2020 06:20), Max: 36 (01 Sep 2020 06:20)  T(F): 96.8 (01 Sep 2020 06:20), Max: 96.8 (01 Sep 2020 06:20)  HR: 73 (01 Sep 2020 06:20) (73 - 73)  BP: 153/93 (01 Sep 2020 06:20) (153/93 - 153/93)  BP(mean): --  RR: 18 (01 Sep 2020 06:20) (18 - 18)  SpO2: --

## 2020-09-02 PROCEDURE — 99231 SBSQ HOSP IP/OBS SF/LOW 25: CPT

## 2020-09-02 RX ORDER — NICOTINE POLACRILEX 2 MG
2 GUM BUCCAL
Refills: 0 | Status: ACTIVE | OUTPATIENT
Start: 2020-09-02 | End: 2021-08-01

## 2020-09-02 RX ADMIN — HALOPERIDOL DECANOATE 2 MILLIGRAM(S): 100 INJECTION INTRAMUSCULAR at 20:24

## 2020-09-02 RX ADMIN — Medication 2 MILLIGRAM(S): at 15:10

## 2020-09-02 RX ADMIN — HALOPERIDOL DECANOATE 2 MILLIGRAM(S): 100 INJECTION INTRAMUSCULAR at 08:47

## 2020-09-02 NOTE — PROGRESS NOTE BEHAVIORAL HEALTH - NSBHCHARTREVIEWVS_PSY_A_CORE FT
Vital Signs Last 24 Hrs  T(C): 36.4 (02 Sep 2020 06:18), Max: 36.4 (02 Sep 2020 06:18)  T(F): 97.6 (02 Sep 2020 06:18), Max: 97.6 (02 Sep 2020 06:18)  HR: 72 (02 Sep 2020 06:18) (72 - 72)  BP: 133/74 (02 Sep 2020 06:18) (133/74 - 133/74)  BP(mean): --  RR: 16 (02 Sep 2020 06:18) (16 - 16)  SpO2: --

## 2020-09-02 NOTE — PROGRESS NOTE BEHAVIORAL HEALTH - NSBHFUPINTERVALHXFT_PSY_A_CORE
Pt seen and evaluated, chart reviewed. As per nursing report patient continues to be intermittently irritable, agitated, disruptive, verbally and physically aggressive toward staff. Periodically refusing vitals, labs and EKG. 1:1 in place for safety/elopement risk. Reluctantly takes po meds with encouragement. TOO in place to ensure medication compliance. On evaluation patient irritabile, agitated and racially inappropriate. States "I don't want to take any more medication" Patient was verbally re-directable and reluctantly took PO medication. Patient continues to have impaired concentration. Thoughts continue to be disorganized and illogical. Patient continues to present with paranoid delusions. Continues to present with poor insight and judgement. Denies depression and anxiety. Denies suicidal/homicidal ideations. Denies A/V hallucinations. Patient visualized on the unit engaging with peers. Pt seen and evaluated, chart reviewed. As per nursing report patient continues to be intermittently irritable, agitated, disruptive, verbally and physically aggressive toward staff. Yesterday tried to get on stretcher meant for another pt being d/c. Periodically refusing vitals, labs and EKG. 1:1 in place for safety/elopement risk. Reluctantly takes po meds with encouragement. TOO in place to ensure medication compliance. On evaluation patient irritabile, agitated and racially inappropriate. States "I don't want to take any more medication" Patient was verbally re-directable and reluctantly took PO medication. Patient continues to have impaired concentration. Thoughts continue to be disorganized and illogical. Patient continues to present with paranoid delusions. Continues to present with poor insight and judgement. Denies depression and anxiety. Denies suicidal/homicidal ideations. Denies A/V hallucinations. Patient visualized on the unit engaging with peers.

## 2020-09-02 NOTE — PROGRESS NOTE BEHAVIORAL HEALTH - SUMMARY
63 year old female , domiciled in nursing home, with history of COPD, DVT, bipolar, and dementia, multiple prior inpatient psych, was at [place] for 2 years, recently discharged from inpatient psych [facility] transferred to [facility], sent to the ER for continuous medication non-compliant for the past months, worsening psychosis and agitation.    Patient presents with decompensated psychiatric symptoms, secondary to medication non-compliance. Disorganized behavior noted and underlying psychosis appears to be impairing patient's judgment and behavior. Patient appears to be a danger to herself at this time due to patient unable to care for herself at this time. Patient would benefit form IPP for safety and stabilization.    Patient continues to be intermittently irritable, agitated, disruptive, verbally and physically aggressive toward staff and racially inappropriate. Thoughts continue to be disorganized, illogical. Patient continues to present with paranoid delusions.  Continues to present with poor insight and judgement.  Denies depression and anxiety. Denies suicidal/homicidal ideations. Denies A/V hallucinations.    #Admit 2PC (9:27)    Plan:    #Schizophrenia  -Haldol 2mg BID PO  -TOO in place Haldol 2mg BID IM if patient refuses PO    #Tobacco Use Disorder  -Nicotine Gum PRN    -Haldol 2mg Q8 PRN for Severe agitation  -Lorazepam 2mg Q12 PRN for agitation  -Hydroxyzine 25mg Q12 PRN for anxiety    -Tylenol PRN for pain  -Pantoprazole for GERD  -Maalox PRN for dyspepsia    -cw 1:1 elopement risk
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
## 2020-09-03 PROCEDURE — 99231 SBSQ HOSP IP/OBS SF/LOW 25: CPT

## 2020-09-03 RX ADMIN — HALOPERIDOL DECANOATE 2 MILLIGRAM(S): 100 INJECTION INTRAMUSCULAR at 20:39

## 2020-09-03 RX ADMIN — Medication 2 MILLIGRAM(S): at 09:35

## 2020-09-03 RX ADMIN — HALOPERIDOL DECANOATE 2 MILLIGRAM(S): 100 INJECTION INTRAMUSCULAR at 08:06

## 2020-09-03 RX ADMIN — Medication 1 MILLIGRAM(S): at 22:21

## 2020-09-03 NOTE — PROGRESS NOTE BEHAVIORAL HEALTH - SUMMARY
63 year old female , domiciled in nursing home, with history of COPD, DVT, bipolar, and dementia, multiple prior inpatient psych, was at [place] for 2 years, recently discharged from inpatient psych [facility] transferred to [facility], sent to the ER for continuous medication non-compliant for the past months, worsening psychosis and agitation.    Patient presents with decompensated psychiatric symptoms, secondary to medication non-compliance. Disorganized behavior noted and underlying psychosis appears to be impairing patient's judgment and behavior. Patient appears to be a danger to herself at this time due to patient unable to care for herself at this time. Patient would benefit form IPP for safety and stabilization.    Patient continues to be intermittently irritable, agitated, disruptive, verbally and physically aggressive toward staff and racially inappropriate. Thoughts continue to be disorganized, illogical. Patient continues to present with paranoid delusions.  Continues to present with poor insight and judgement.  Denies depression and anxiety. Denies suicidal/homicidal ideations. Denies A/V hallucinations.    #Admit 2PC (9:27)    Plan:    #Schizophrenia  -Haldol 2mg BID PO  -TOO in place Haldol 2mg BID IM if patient refuses PO    #Tobacco Use Disorder  -Nicotine Gum PRN    -Haldol 2mg Q8 PRN for Severe agitation  -Lorazepam 2mg Q12 PRN for agitation  -Hydroxyzine 25mg Q12 PRN for anxiety    -Tylenol PRN for pain  -Pantoprazole for GERD  -Maalox PRN for dyspepsia    -cw 1:1 elopement risk

## 2020-09-03 NOTE — PROGRESS NOTE BEHAVIORAL HEALTH - NSBHCHARTREVIEWVS_PSY_A_CORE FT
Vital Signs Last 24 Hrs  T(C): --  T(F): --  HR: 77 (02 Sep 2020 15:45) (77 - 77)  BP: 140/81 (02 Sep 2020 15:45) (140/81 - 140/81)  BP(mean): --  RR: 16 (02 Sep 2020 15:45) (16 - 16)  SpO2: --

## 2020-09-03 NOTE — PROGRESS NOTE BEHAVIORAL HEALTH - NSBHFUPINTERVALHXFT_PSY_A_CORE
Pt seen and evaluated, chart reviewed. As per nursing report patient continues to be intermittently irritable, agitated, disruptive, verbally and physically aggressive toward staff. Periodically refusing vitals, labs and EKG. 1:1 in place for safety/elopement risk. Reluctantly takes po meds with encouragement. TOO in place to ensure medication compliance. On evaluation patient irritabile, but cooperative. States "I don't want somebody following me, I don't need it" Patient agreed to be in good behavioral control today and to have a conversation regarding d/c of 1:1. Patient continues to have impaired concentration. Thoughts continue to be disorganized and illogical. Patient continues to present with paranoid delusions. Continues to present with poor insight and judgement. Denies depression and anxiety. Denies suicidal/homicidal ideations. Denies A/V hallucinations. Patient visualized on the unit engaging with peers. Patient seen and evaluated, chart reviewed. As per nursing report patient continues to be intermittently irritable, agitated, disruptive, verbally and physically aggressive toward staff. Periodically refusing vitals, labs and EKG. 1:1 in place for safety/elopement risk. Reluctantly takes po meds with encouragement. TOO in place to ensure medication compliance. On evaluation patient irritable, but cooperative. States "I don't want somebody following me, I don't need it" Patient agreed to be in good behavioral control today and to have a conversation with writer tomorrow morning regarding d/c of 1:1. Patient continues to have impaired concentration. Thoughts continue to be disorganized and illogical. Patient continues to present with paranoid delusions. Continues to present with poor insight and judgement. Denies depression and anxiety. Denies suicidal/homicidal ideations. Denies A/V hallucinations. Patient visualized on the unit engaging with peers.    Patient observed later in the day calm, cooperative, in good behavioral control engaging with peers and attending group. Patient seen and evaluated, chart reviewed. As per nursing report patient continues to be intermittently irritable, agitated, disruptive, verbally and physically aggressive toward staff. Periodically refusing vitals, labs and EKG. 1:1 in place for safety/elopement risk. Reluctantly takes po meds with encouragement. TOO in place to ensure medication compliance. On evaluation patient irritable, but cooperative. States "I don't want somebody following me, I don't need it" Patient agreed to be in good behavioral control today and to have a conversation with writer tomorrow morning regarding d/c of 1:1. Patient continues to have impaired concentration. Thoughts continue to be disorganized and illogical. Patient continues to present with paranoid delusions. Continues to present with poor insight and judgement. Denies depression and anxiety. Denies suicidal/homicidal ideations. Denies A/V hallucinations. Patient visualized on the unit engaging with peers.    RN made writer aware pt came to RN at 1010 and stated she was very distressed and angry. After discussion pt took prn of [id number] po ordered but only 1 mg was taken by patient with good effect.    Patient observed later in the day calm, cooperative, in good behavioral control engaging with peers and attending group.

## 2020-09-04 PROCEDURE — 99231 SBSQ HOSP IP/OBS SF/LOW 25: CPT

## 2020-09-04 RX ADMIN — HALOPERIDOL DECANOATE 2 MILLIGRAM(S): 100 INJECTION INTRAMUSCULAR at 08:14

## 2020-09-04 RX ADMIN — HALOPERIDOL DECANOATE 2 MILLIGRAM(S): 100 INJECTION INTRAMUSCULAR at 20:13

## 2020-09-04 NOTE — PROGRESS NOTE BEHAVIORAL HEALTH - SUMMARY
63 year old female , domiciled in nursing home, with history of COPD, DVT, bipolar, and dementia, multiple prior inpatient psych, was at [place] for 2 years, recently discharged from inpatient psych [facility] transferred to [facility], sent to the ER for continuous medication non-compliant for the past months, worsening psychosis and agitation.    Patient presents with decompensated psychiatric symptoms, secondary to medication non-compliance. Disorganized behavior noted and underlying psychosis appears to be impairing patient's judgment and behavior. Patient appears to be a danger to herself at this time due to patient unable to care for herself at this time. Patient would benefit form IPP for safety and stabilization.    Patient continues to be intermittently irritable. Thoughts continue to be disorganized, illogical. Patient continues to present with paranoid delusions.  Continues to present with poor insight and judgement.  Denies depression and anxiety. Denies suicidal/homicidal ideations. Denies A/V hallucinations.    #Admit 2PC (9:27)    Plan:    #Schizophrenia  -Haldol 2mg BID PO  -TOO in place Haldol 2mg BID IM if patient refuses PO    #Tobacco Use Disorder  -Nicotine Gum PRN    -Haldol 2mg Q8 PRN for Severe agitation  -Lorazepam 2mg Q12 PRN for agitation  -Hydroxyzine 25mg Q12 PRN for anxiety    -Tylenol PRN for pain  -Pantoprazole for GERD  -Maalox PRN for dyspepsia    -cw 1:1 elopement risk 63 year old female , domiciled in nursing home, with history of COPD, DVT, bipolar, and dementia, multiple prior inpatient psych, was at [place] for 2 years, recently discharged from inpatient psych [facility] transferred to [facility], sent to the ER for continuous medication non-compliant for the past months, worsening psychosis and agitation.    Patient presents with decompensated psychiatric symptoms, secondary to medication non-compliance. Disorganized behavior noted and underlying psychosis appears to be impairing patient's judgment and behavior. Patient appears to be a danger to herself at this time due to patient unable to care for herself at this time. Patient would benefit form IPP for safety and stabilization.    Patient continues to be intermittently irritable. Thoughts continue to be disorganized, illogical. Patient continues to present with paranoid delusions.  Continues to present with poor insight and judgement.  Denies depression and anxiety. Denies suicidal/homicidal ideations. Denies A/V hallucinations.    #Admit 2PC (9:27)    Plan:    #Schizophrenia  -Haldol 2mg BID PO  -TOO in place Haldol 2mg BID IM if patient refuses PO    #Tobacco Use Disorder  -Nicotine Gum PRN    -Haldol 2mg Q8 PRN for Severe agitation  -Lorazepam 2mg Q12 PRN for agitation  -Hydroxyzine 25mg Q12 PRN for anxiety    -Tylenol PRN for pain  -Pantoprazole for GERD  -Maalox PRN for dyspepsia    -d/c 1:1

## 2020-09-04 NOTE — CHART NOTE - NSCHARTNOTEFT_GEN_A_CORE
The treatment team met with pt. to review treatment plan, medications and discharge plan.  Pt. continues to present as disorganized and illogical.  She also continues to present with paranoid delusions.  She appears to minimize the severity of her illness.  Pt. is somewhat compliant with her medication since TOO was obtained.  However, she does periodically refuse along with refusing labs and vitals.  Compliance with this is important in order for her to return to her residence at the [facility].  They state they are willing to accept her back, however, pt. needs to take medication voluntarily.  Pt. states she wants "to be discharged as soon as possible".  It was explained that she needs to be compliant with her medication and labs in order for this to happen.  Pt. denies any suicidal or homicidal ideation or any A/V hallucinations.  She remains active and present on the unit.    Mental Status Exam:    Mood-  Anxious    Sleep-  Normal    Appetite-  Normal    ADL's-  Fair    Thought Process-  Disorganized/Illogical/Impaired Reasoning/Delusions    Observation-  Routine    Pt. is not psychiatrically stable for discharge at this time.

## 2020-09-04 NOTE — PROGRESS NOTE BEHAVIORAL HEALTH - NSBHFUPINTERVALHXFT_PSY_A_CORE
Patient seen and evaluated in treatment team, chart reviewed. As per nursing report patient continues to be intermittently irritable but was re-directable. Patient felt anxious and asked for PRN medication with good effect. Periodically refusing vitals, labs and EKG. 1:1 in place for safety/elopement risk. Reluctantly takes po meds with encouragement. TOO in place to ensure medication compliance. On evaluation patient calm and cooperative. States she wants to be discharged as soon as possible. Patient again agreed to be in good behavioral control today. Patient continues to have impaired concentration. Thoughts continue to be disorganized and illogical. Patient continues to present with paranoid delusions. Continues to present with poor insight and judgement. Denies depression and anxiety. Denies suicidal/homicidal ideations. Denies A/V hallucinations. Patient visualized on the unit engaging with peers and going to groups yesterday. Patient observed as calm and in good behavioral control during group. Patient seen and evaluated in treatment team, chart reviewed. As per nursing report patient continues to be intermittently irritable but was re-directable. Patient felt anxious and asked for PRN medication with good effect. Periodically refusing vitals, labs and EKG. 1:1 in place for safety/elopement risk. Reluctantly takes po meds with encouragement. TOO in place to ensure medication compliance. On evaluation patient calm and cooperative. States she wants to be discharged as soon as possible. Patient again agreed to be in good behavioral control today. Patient continues to have impaired concentration. Thoughts continue to be disorganized and illogical. Patient continues to present with paranoid delusions. Continues to present with poor insight and judgement. Denies depression and anxiety. Denies suicidal/homicidal ideations. Denies A/V hallucinations. Patient visualized on the unit engaging with peers and going to groups yesterday. Patient observed as calm and in good behavioral control during group.    Patient observed multiple times throughout the day. Patient calm and cooperative, in good behavioral control. No verbal outbursts. No attempts to elope. Patient visualized on the unit engaging with peers and watching tv in tv room. Will d/c 1:1 at this time. Patient seen and evaluated in treatment team, chart reviewed. As per nursing report patient continues to be intermittently irritable but was re-directable. Patient felt anxious and asked for PRN medication with good effect. Periodically refusing vitals, labs and EKG. 1:1 in place for safety/elopement risk. Reluctantly takes po meds with encouragement. TOO in place to ensure medication compliance. On evaluation patient calm and cooperative. States she wants to be discharged as soon as possible. Patient again agreed to be in good behavioral control today. Patient continues to have impaired concentration. Thoughts continue to be disorganized and illogical. Patient continues to present with paranoid delusions. Continues to present with poor insight and judgement. Denies depression and anxiety. Denies suicidal/homicidal ideations. Denies A/V hallucinations. Patient visualized on the unit engaging with peers and going to groups yesterday. Patient observed as calm and in good behavioral control during group.    Patient observed multiple times throughout the day. Patient calm and cooperative, in good behavioral control. No acute bouts of verbal aggression/outbursts. No attempts to elope. Patient visualized on the unit engaging with peers and watching tv in tv room. Will d/c 1:1 at this time.

## 2020-09-04 NOTE — PROGRESS NOTE BEHAVIORAL HEALTH - NSBHCHARTREVIEWVS_PSY_A_CORE FT
Vital Signs Last 24 Hrs  T(C): 36.1 (04 Sep 2020 09:31), Max: 36.1 (04 Sep 2020 09:31)  T(F): 96.9 (04 Sep 2020 09:31), Max: 96.9 (04 Sep 2020 09:31)  HR: 87 (04 Sep 2020 09:31) (80 - 87)  BP: 141/67 (04 Sep 2020 09:31) (141/67 - 148/70)  BP(mean): --  RR: 18 (04 Sep 2020 09:31) (16 - 18)  SpO2: --

## 2020-09-05 RX ADMIN — PANTOPRAZOLE SODIUM 40 MILLIGRAM(S): 20 TABLET, DELAYED RELEASE ORAL at 08:23

## 2020-09-05 RX ADMIN — HALOPERIDOL DECANOATE 2 MILLIGRAM(S): 100 INJECTION INTRAMUSCULAR at 20:04

## 2020-09-05 RX ADMIN — HALOPERIDOL DECANOATE 2 MILLIGRAM(S): 100 INJECTION INTRAMUSCULAR at 08:23

## 2020-09-06 RX ADMIN — HALOPERIDOL DECANOATE 2 MILLIGRAM(S): 100 INJECTION INTRAMUSCULAR at 20:12

## 2020-09-06 RX ADMIN — Medication 1 MILLIGRAM(S): at 08:33

## 2020-09-07 RX ADMIN — HALOPERIDOL DECANOATE 2 MILLIGRAM(S): 100 INJECTION INTRAMUSCULAR at 08:37

## 2020-09-07 RX ADMIN — Medication 2 MILLIGRAM(S): at 19:13

## 2020-09-07 RX ADMIN — PANTOPRAZOLE SODIUM 40 MILLIGRAM(S): 20 TABLET, DELAYED RELEASE ORAL at 08:37

## 2020-09-07 RX ADMIN — HALOPERIDOL DECANOATE 2 MILLIGRAM(S): 100 INJECTION INTRAMUSCULAR at 19:13

## 2020-09-07 RX ADMIN — Medication 2 MILLIGRAM(S): at 11:06
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
## 2020-09-08 PROCEDURE — 99231 SBSQ HOSP IP/OBS SF/LOW 25: CPT

## 2020-09-08 RX ORDER — HALOPERIDOL DECANOATE 100 MG/ML
3 INJECTION INTRAMUSCULAR EVERY 12 HOURS
Refills: 0 | Status: DISCONTINUED | OUTPATIENT
Start: 2020-09-08 | End: 2020-09-09

## 2020-09-08 RX ADMIN — HALOPERIDOL DECANOATE 2 MILLIGRAM(S): 100 INJECTION INTRAMUSCULAR at 10:09

## 2020-09-08 RX ADMIN — HALOPERIDOL DECANOATE 3 MILLIGRAM(S): 100 INJECTION INTRAMUSCULAR at 20:10
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
## 2020-09-08 NOTE — PROGRESS NOTE BEHAVIORAL HEALTH - SUMMARY
63 year old female , domiciled in nursing home, with history of COPD, DVT, bipolar, and dementia, multiple prior inpatient psych, was at [place] for 2 years, recently discharged from inpatient psych [facility] transferred to [facility], sent to the ER for continuous medication non-compliant for the past months, worsening psychosis and agitation.    Patient presents with decompensated psychiatric symptoms, secondary to medication non-compliance. Disorganized behavior noted and underlying psychosis appears to be impairing patient's judgment and behavior. Patient appears to be a danger to herself at this time due to patient unable to care for herself at this time. Patient would benefit form IPP for safety and stabilization.    Patient continues to be intermittently irritable. Thoughts continue to be disorganized, illogical. Patient continues to present with paranoid delusions.  Continues to present with poor insight and judgement.  Denies depression and anxiety. Denies suicidal/homicidal ideations. Denies A/V hallucinations.    #Admit 2PC (9:27)    Plan:    #Schizophrenia  -Increase Haldol to 3mg BID PO  -TOO in place Haldol 3mg BID IM if patient refuses PO    #Tobacco Use Disorder  -Nicotine Gum PRN    -Haldol 2mg Q8 PRN for Severe agitation  -Lorazepam 2mg Q12 PRN for agitation  -Hydroxyzine 25mg Q12 PRN for anxiety    -Tylenol PRN for pain  -Pantoprazole for GERD  -Maalox PRN for dyspepsia    -EKG in AM

## 2020-09-08 NOTE — PROGRESS NOTE BEHAVIORAL HEALTH - NSBHCHARTREVIEWVS_PSY_A_CORE FT
Vital Signs Last 24 Hrs  T(C): --  T(F): --  HR: 89 (07 Sep 2020 15:49) (89 - 89)  BP: 125/60 (07 Sep 2020 15:49) (125/60 - 125/60)  BP(mean): --  RR: 16 (07 Sep 2020 15:49) (16 - 16)  SpO2: --

## 2020-09-09 PROCEDURE — 99231 SBSQ HOSP IP/OBS SF/LOW 25: CPT

## 2020-09-09 RX ORDER — HALOPERIDOL DECANOATE 100 MG/ML
4 INJECTION INTRAMUSCULAR EVERY 12 HOURS
Refills: 0 | Status: DISCONTINUED | OUTPATIENT
Start: 2020-09-09 | End: 2020-09-16

## 2020-09-09 RX ADMIN — HALOPERIDOL DECANOATE 4 MILLIGRAM(S): 100 INJECTION INTRAMUSCULAR at 20:00

## 2020-09-09 RX ADMIN — Medication 1 MILLIGRAM(S): at 18:25

## 2020-09-09 RX ADMIN — HALOPERIDOL DECANOATE 3 MILLIGRAM(S): 100 INJECTION INTRAMUSCULAR at 08:30

## 2020-09-09 NOTE — PROGRESS NOTE BEHAVIORAL HEALTH - SUMMARY
63 year old female , domiciled in nursing home, with history of COPD, DVT, bipolar, and dementia, multiple prior inpatient psych, was at [place] for 2 years, recently discharged from inpatient psych [facility] transferred to [place] [facility], sent to the ER for continuous medication non-compliant for the past months, worsening psychosis and agitation.    Patient presents with decompensated psychiatric symptoms, secondary to medication non-compliance. Disorganized behavior noted and underlying psychosis appears to be impairing patient's judgment and behavior. Patient appears to be a danger to herself at this time due to patient unable to care for herself at this time. Patient would benefit form IPP for safety and stabilization.    Patient continues to be intermittently irritable, agitated, disruptive, verbally and physically aggressive toward staff. Thoughts continue to be disorganized, illogical. Patient continues to present with paranoid delusions.  Continues to present with poor insight and judgement.  Denies depression and anxiety. Denies suicidal/homicidal ideations. Denies A/V hallucinations.    #Admit 2PC (9:27)    Plan:    #Schizophrenia  -Haldol 3mg BID PO  -TOO in place Haldol 3mg BID IM if patient refuses PO    #Tobacco Use Disorder  -Nicotine Gum PRN    -Haldol 2mg Q8 PRN for Severe agitation  -Lorazepam 2mg Q12 PRN for agitation  -Hydroxyzine 25mg Q12 PRN for anxiety    -Tylenol PRN for pain  -Pantoprazole for GERD  -Maalox PRN for dyspepsia    -EKG done 63 year old female , domiciled in nursing home, with history of COPD, DVT, bipolar, and dementia, multiple prior inpatient psych, was at [place] for 2 years, recently discharged from inpatient psych [facility] transferred to [facility], sent to the ER for continuous medication non-compliant for the past months, worsening psychosis and agitation.    Patient presents with decompensated psychiatric symptoms, secondary to medication non-compliance. Disorganized behavior noted and underlying psychosis appears to be impairing patient's judgment and behavior. Patient appears to be a danger to herself at this time due to patient unable to care for herself at this time. Patient would benefit form IPP for safety and stabilization.    Patient continues to be intermittently irritable, agitated, disruptive, verbally and physically aggressive toward staff. Thoughts continue to be disorganized, illogical. Patient continues to present with paranoid delusions.  Continues to present with poor insight and judgement.  Denies depression and anxiety. Denies suicidal/homicidal ideations. Denies A/V hallucinations.    #Admit 2PC (9:27)    Plan:    #Schizophrenia  -increase Haldol to 4mg Q12 PO  -TOO in place Haldol 4mg Q12 IM if patient refuses PO    #Tobacco Use Disorder  -Nicotine Gum PRN    -Haldol 2mg Q8 PRN for Severe agitation  -Lorazepam 2mg Q12 PRN for agitation  -Hydroxyzine 25mg Q12 PRN for anxiety    -Tylenol PRN for pain  -Pantoprazole for GERD  -Maalox PRN for dyspepsia    -EKG done
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
## 2020-09-09 NOTE — PROGRESS NOTE BEHAVIORAL HEALTH - NSBHCHARTREVIEWVS_PSY_A_CORE FT
Vital Signs Last 24 Hrs  T(C): --  T(F): --  HR: 72 (09 Sep 2020 09:35) (72 - 109)  BP: 131/67 (09 Sep 2020 09:35) (131/67 - 156/81)  BP(mean): --  RR: 18 (09 Sep 2020 09:35) (16 - 18)  SpO2: --

## 2020-09-10 PROCEDURE — 99231 SBSQ HOSP IP/OBS SF/LOW 25: CPT

## 2020-09-10 RX ADMIN — Medication 1 MILLIGRAM(S): at 23:10

## 2020-09-10 RX ADMIN — HALOPERIDOL DECANOATE 4 MILLIGRAM(S): 100 INJECTION INTRAMUSCULAR at 08:26

## 2020-09-10 RX ADMIN — HALOPERIDOL DECANOATE 4 MILLIGRAM(S): 100 INJECTION INTRAMUSCULAR at 20:03

## 2020-09-10 NOTE — PROGRESS NOTE BEHAVIORAL HEALTH - SUMMARY
63 year old female , domiciled in nursing home, with history of COPD, DVT, bipolar, and dementia, multiple prior inpatient psych, was at [place] for 2 years, recently discharged from inpatient psych [facility] transferred to [facility], sent to the ER for continuous medication non-compliant for the past months, worsening psychosis and agitation.    Patient presents with decompensated psychiatric symptoms, secondary to medication non-compliance. Disorganized behavior noted and underlying psychosis appears to be impairing patient's judgment and behavior. Patient appears to be a danger to herself at this time due to patient unable to care for herself at this time. Patient would benefit form IPP for safety and stabilization.    Patient continues to be intermittently irritable, agitated, disruptive, verbally and physically aggressive toward staff. Thoughts continue to be disorganized, illogical. Patient continues to present with paranoid delusions.  Continues to present with poor insight and judgement.  Denies depression and anxiety. Denies suicidal/homicidal ideations. Denies A/V hallucinations.    #Admit 2PC (9:27)    Plan:    #Schizophrenia  -Haldol 4mg Q12 PO  -TOO in place Haldol 4mg Q12 IM if patient refuses PO    #Tobacco Use Disorder  -Nicotine Gum PRN    -Haldol 2mg Q8 PRN for Severe agitation  -Lorazepam 2mg Q12 PRN for agitation  -Hydroxyzine 25mg Q12 PRN for anxiety    -Tylenol PRN for pain  -Pantoprazole for GERD  -Maalox PRN for dyspepsia    -EKG done
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
## 2020-09-10 NOTE — PROGRESS NOTE BEHAVIORAL HEALTH - NSBHFUPINTERVALHXFT_PSY_A_CORE
Patient seen and evaluated, chart reviewed. As per nursing report patient continues to be intermittently irritable, agitated, disruptive, verbally and physically aggressive toward staff requiring PRN'S. Periodically refusing vitals. Reluctantly takes po meds with encouragement. TOO in place to ensure medication compliance. On evaluation patient calm and cooperative this morning. Writer stressed the importance of medication compliance and good behavioral control. Patient continues to have impaired concentration. Thoughts continue to be disorganized and illogical. Patient continues to present with paranoid delusions. Continues to present with poor insight and judgement. Denies depression and anxiety. Denies suicidal/homicidal ideations. Denies A/V hallucinations.

## 2020-09-10 NOTE — PROGRESS NOTE BEHAVIORAL HEALTH - NSBHCHARTREVIEWVS_PSY_A_CORE FT
Vital Signs Last 24 Hrs  T(C): --  T(F): --  HR: 99 (09 Sep 2020 17:12) (72 - 99)  BP: 149/85 (09 Sep 2020 17:12) (131/67 - 149/85)  BP(mean): --  RR: 18 (09 Sep 2020 17:12) (18 - 18)  SpO2: --

## 2020-09-11 PROCEDURE — 99231 SBSQ HOSP IP/OBS SF/LOW 25: CPT

## 2020-09-11 RX ADMIN — HALOPERIDOL DECANOATE 4 MILLIGRAM(S): 100 INJECTION INTRAMUSCULAR at 20:19

## 2020-09-11 RX ADMIN — HALOPERIDOL DECANOATE 4 MILLIGRAM(S): 100 INJECTION INTRAMUSCULAR at 08:27

## 2020-09-11 NOTE — PROGRESS NOTE BEHAVIORAL HEALTH - NSBHCHARTREVIEWVS_PSY_A_CORE FT
Vital Signs Last 24 Hrs  T(C): --  T(F): --  HR: 86 (10 Sep 2020 16:04) (86 - 86)  BP: 145/92 (10 Sep 2020 16:04) (145/92 - 145/92)  BP(mean): --  RR: 18 (10 Sep 2020 16:04) (18 - 18)  SpO2: --

## 2020-09-11 NOTE — PROGRESS NOTE BEHAVIORAL HEALTH - NSBHFUPINTERVALHXFT_PSY_A_CORE
Patient seen and evaluated in treatment team, chart reviewed. As per nursing report no acute events over night.  Patient continues to be intermittently irritable, agitated, disruptive, verbally and physically aggressive toward staff requiring PRN'S. Periodically refusing vitals. Reluctantly takes po meds with encouragement. TOO in place to ensure medication compliance. On evaluation patient stating "My daughter is in distress".  Continued to talk about how she feels her daughter is in distress but will not talk to any one. Patient continues to have impaired concentration. Thoughts continue to be disorganized and illogical. Patient continues to present with paranoid delusions. Continues to present with poor insight and judgement. Denies depression and anxiety. Denies suicidal/homicidal ideations. Denies A/V hallucinations. Patient seen and evaluated in treatment team, chart reviewed. As per nursing report no acute events over night.  Patient continues to be intermittently irritable, agitated, disruptive, verbally and physically aggressive toward staff requiring PRN'S. Periodically refusing vitals. Reluctantly takes po meds with encouragement. TOO in place to ensure medication compliance. On evaluation patient stating "My daughter is in distress".  Continued to talk about how she feels her daughter is in distress but will not talk to any one. Patient continues to have impaired concentration. Thoughts continue to be disorganized and illogical. Patient continues to present with paranoid delusions. Continues to present with poor insight and judgement. Denies depression and anxiety. Denies suicidal/homicidal ideations. Denies A/V hallucinations.    Placed call to patients jeovany Frazier [phone]. Updated her on patients care, treatment and progress. No answer, left message.

## 2020-09-11 NOTE — PROGRESS NOTE BEHAVIORAL HEALTH - SUMMARY
63 year old female , domiciled in nursing home, with history of COPD, DVT, bipolar, and dementia, multiple prior inpatient psych, was at [place] for 2 years, recently discharged from inpatient psych [facility] transferred to [facility], sent to the ER for continuous medication non-compliant for the past months, worsening psychosis and agitation.    Patient presents with decompensated psychiatric symptoms, secondary to medication non-compliance. Disorganized behavior noted and underlying psychosis appears to be impairing patient's judgment and behavior. Patient appears to be a danger to herself at this time due to patient unable to care for herself at this time. Patient would benefit form IPP for safety and stabilization.    Patient continues to be intermittently irritable, agitated, disruptive, verbally and physically aggressive toward staff. Thoughts continue to be disorganized, illogical. Patient continues to present with paranoid delusions.  Continues to present with poor insight and judgement.  Denies depression and anxiety. Denies suicidal/homicidal ideations. Denies A/V hallucinations.    #Admit 2PC (9:27)    Plan:    #Schizophrenia  -Haldol 4mg Q12 PO  -TOO in place Haldol 4mg Q12 IM if patient refuses PO    #Tobacco Use Disorder  -Nicotine Gum PRN    -Haldol 2mg Q8 PRN for Severe agitation  -Lorazepam 2mg Q12 PRN for agitation  -Hydroxyzine 25mg Q12 PRN for anxiety    -Tylenol PRN for pain  -Pantoprazole for GERD  -Maalox PRN for dyspepsia    -EKG done

## 2020-09-12 RX ADMIN — HALOPERIDOL DECANOATE 4 MILLIGRAM(S): 100 INJECTION INTRAMUSCULAR at 20:33

## 2020-09-12 RX ADMIN — Medication 1 MILLIGRAM(S): at 23:00

## 2020-09-12 RX ADMIN — HALOPERIDOL DECANOATE 4 MILLIGRAM(S): 100 INJECTION INTRAMUSCULAR at 12:05

## 2020-09-13 RX ADMIN — Medication 2 MILLIGRAM(S): at 20:25

## 2020-09-13 RX ADMIN — HALOPERIDOL DECANOATE 4 MILLIGRAM(S): 100 INJECTION INTRAMUSCULAR at 08:04

## 2020-09-13 RX ADMIN — HALOPERIDOL DECANOATE 4 MILLIGRAM(S): 100 INJECTION INTRAMUSCULAR at 20:22

## 2020-09-14 PROCEDURE — 99231 SBSQ HOSP IP/OBS SF/LOW 25: CPT

## 2020-09-14 RX ADMIN — HALOPERIDOL DECANOATE 4 MILLIGRAM(S): 100 INJECTION INTRAMUSCULAR at 20:23

## 2020-09-14 RX ADMIN — Medication 1 MILLIGRAM(S): at 21:15

## 2020-09-14 RX ADMIN — HALOPERIDOL DECANOATE 4 MILLIGRAM(S): 100 INJECTION INTRAMUSCULAR at 12:14

## 2020-09-14 NOTE — PROGRESS NOTE BEHAVIORAL HEALTH - NSBHFUPINTERVALHXFT_PSY_A_CORE
Patient seen and evaluated, chart reviewed. As per nursing report patient continues to be intermittently irritable, agitated, disruptive, verbally and physically aggressive toward staff requiring PRN'S. Periodically refusing vitals. Reluctantly takes po meds with encouragement. TOO in place to ensure medication compliance. On evaluation patient stating "Am I leaving today". Patient continues to have impaired concentration. Thoughts continue to be disorganized and illogical. Patient continues to present with paranoid delusions. Continues to present with poor insight and judgement. Denies depression and anxiety. Denies suicidal/homicidal ideations. Denies A/V hallucinations.

## 2020-09-14 NOTE — PROGRESS NOTE BEHAVIORAL HEALTH - SUMMARY
63 year old female , domiciled in nursing home, with history of COPD, DVT, bipolar, and dementia, multiple prior inpatient psych, was at [place] for 2 years, recently discharged from inpatient psych [facility] transferred to [facility], sent to the ER for continuous medication non-compliant for the past months, worsening psychosis and agitation.    Patient presents with decompensated psychiatric symptoms, secondary to medication non-compliance. Disorganized behavior noted and underlying psychosis appears to be impairing patient's judgment and behavior. Patient appears to be a danger to herself at this time due to patient unable to care for herself at this time. Patient would benefit form IPP for safety and stabilization.    Patient continues to be intermittently irritable, agitated, disruptive, verbally and physically aggressive toward staff. Thoughts continue to be disorganized, illogical. Patient continues to present with paranoid delusions.  Continues to present with poor insight and judgement.  Denies depression and anxiety. Denies suicidal/homicidal ideations. Denies A/V hallucinations.    #Admit 2PC (9:27)    Plan:    #Schizophrenia  -Haldol 4mg Q12 PO  -TOO in place Haldol 4mg Q12 IM if patient refuses PO    #Tobacco Use Disorder  -Nicotine Gum PRN    -Haldol 2mg Q8 PRN for Severe agitation  -Lorazepam 2mg Q12 PRN for agitation  -Hydroxyzine 25mg Q12 PRN for anxiety    -Tylenol PRN for pain  -Pantoprazole for GERD  -Maalox PRN for dyspepsia    -EKG done

## 2020-09-14 NOTE — CHART NOTE - NSCHARTNOTEFT_GEN_A_CORE
The treatment team met with pt. to review treatment plan, medications and discharge plan.  Pt. continues to present as disorganized and illogical with paranoid delusions.  She continues to present as irritable, agitated and hostile.  She continues to require encouragement to take her medication.  Pt. also remains an elopement risk.  Pt. does not engage in meaningful dialogue other than to ask about discharge. She become irritable when informed she is not being discharged at this time.  In order for pt to return to the [facility], she must become and remain compliant with her medication.  This has been reviewed with pt. multiple times.  Once pt. is compliant with her medication, pt. will be referred back to the [facility].    Mental Status Exam:    Mood-  Irritable    Sleep-  Normal    Appetite-  Normal    ADL's-  Fair/Poor    Thought Process-  Disorganized/Illogical/Delusions    Observation-  Constant    Pt. is not yet stable for discharge at this time.

## 2020-09-15 PROCEDURE — 99231 SBSQ HOSP IP/OBS SF/LOW 25: CPT

## 2020-09-15 RX ADMIN — Medication 25 MILLIGRAM(S): at 03:31

## 2020-09-15 RX ADMIN — PANTOPRAZOLE SODIUM 40 MILLIGRAM(S): 20 TABLET, DELAYED RELEASE ORAL at 09:04

## 2020-09-15 RX ADMIN — HALOPERIDOL DECANOATE 4 MILLIGRAM(S): 100 INJECTION INTRAMUSCULAR at 09:05

## 2020-09-15 RX ADMIN — HALOPERIDOL DECANOATE 4 MILLIGRAM(S): 100 INJECTION INTRAMUSCULAR at 20:30

## 2020-09-15 NOTE — PROGRESS NOTE BEHAVIORAL HEALTH - SUMMARY
63 year old female , domiciled in nursing home, with history of COPD, DVT, bipolar, and dementia, multiple prior inpatient psych, was at [place] for 2 years, recently discharged from inpatient psych [facility] transferred to [facility], sent to the ER for continuous medication non-compliant for the past months, worsening psychosis and agitation.    Patient presents with decompensated psychiatric symptoms, secondary to medication non-compliance. Disorganized behavior noted and underlying psychosis appears to be impairing patient's judgment and behavior. Patient appears to be a danger to herself at this time due to patient unable to care for herself at this time. Patient would benefit form IPP for safety and stabilization.    Patient continues to be intermittently irritable, agitated, disruptive, verbally and physically aggressive toward staff. Thoughts continue to be disorganized, illogical. Patient continues to present with paranoid delusions.  Continues to present with poor insight and judgement.  Denies depression and anxiety. Denies suicidal/homicidal ideations. Denies A/V hallucinations.    #Admit 2PC (9:27)    Plan:    #Schizophrenia  -Haldol 4mg Q12 PO  -TOO in place Haldol 4mg Q12 IM if patient refuses PO    #Tobacco Use Disorder  -Nicotine Gum PRN    -Haldol 2mg Q8 PRN for Severe agitation  -Lorazepam 2mg Q12 PRN for agitation  -Hydroxyzine 25mg Q12 PRN for anxiety    -Tylenol PRN for pain  -Pantoprazole for GERD  -Maalox PRN for dyspepsia    -EKG done

## 2020-09-15 NOTE — PROGRESS NOTE BEHAVIORAL HEALTH - NSBHFUPINTERVALHXFT_PSY_A_CORE
Patient seen and evaluated, chart reviewed. As per nursing report patient increasingly agitated when asked to lift her tongue to confirm med administration. Pt then spit pills at RN and kicked RN. PRN IM adm'd as ordered. Patient continues to be intermittently irritable, agitated, disruptive, verbally and physically aggressive toward staff requiring PRN'S. Periodically refusing vitals. Reluctantly takes po meds at times with encouragement. TOO in place to ensure medication compliance. On evaluation patient continues to have impaired concentration. Thoughts continue to be disorganized and illogical. Patient continues to present with paranoid delusions. Continues to present with poor insight and judgement. Denies depression and anxiety. Denies suicidal/homicidal ideations. Denies A/V hallucinations.

## 2020-09-16 PROCEDURE — 99231 SBSQ HOSP IP/OBS SF/LOW 25: CPT

## 2020-09-16 RX ORDER — HALOPERIDOL DECANOATE 100 MG/ML
5 INJECTION INTRAMUSCULAR EVERY 12 HOURS
Refills: 0 | Status: DISCONTINUED | OUTPATIENT
Start: 2020-09-16 | End: 2020-09-18

## 2020-09-16 RX ADMIN — HALOPERIDOL DECANOATE 4 MILLIGRAM(S): 100 INJECTION INTRAMUSCULAR at 08:16

## 2020-09-16 RX ADMIN — HALOPERIDOL DECANOATE 5 MILLIGRAM(S): 100 INJECTION INTRAMUSCULAR at 20:26

## 2020-09-16 RX ADMIN — Medication 25 MILLIGRAM(S): at 23:43

## 2020-09-16 NOTE — CHART NOTE - NSCHARTNOTEFT_GEN_A_CORE
The treatment team met with pt. to review treatment plan, medications and discharge plan.  There is no change in pt.'s mental status at this time.  Pt remains delusional, psychotic, paranoid and agitated at times  She continues to require encouragement in order to take her medication.  TOO is in place.  She is also verbally and physically aggressive towards staff. Pt. received PRN's as a result. Pt. does not engage in any meaningful dialogue due to her illness.  Once stable and pt. takes her medication without incident, she will be referred back to her residence at [facility].

## 2020-09-16 NOTE — PROGRESS NOTE BEHAVIORAL HEALTH - SUMMARY
63 year old female , domiciled in nursing home, with history of COPD, DVT, bipolar, and dementia, multiple prior inpatient psych, was at [place] for 2 years, recently discharged from inpatient psych [facility] transferred to [facility], sent to the ER for continuous medication non-compliant for the past months, worsening psychosis and agitation.    Patient presents with decompensated psychiatric symptoms, secondary to medication non-compliance. Disorganized behavior noted and underlying psychosis appears to be impairing patient's judgment and behavior. Patient appears to be a danger to herself at this time due to patient unable to care for herself at this time. Patient would benefit form IPP for safety and stabilization.    Patient continues to be intermittently irritable, agitated, disruptive, verbally and physically aggressive toward staff. Thoughts continue to be disorganized, illogical. Patient continues to present with paranoid delusions.  Continues to present with poor insight and judgement.  Denies depression and anxiety. Denies suicidal/homicidal ideations. Denies A/V hallucinations.    #Admit 2PC (9:27)    Plan:    #Schizophrenia  -Haldol 4mg Q12 PO  -TOO in place Haldol 4mg Q12 IM if patient refuses PO    #Tobacco Use Disorder  -Nicotine Gum PRN    -Haldol 2mg Q8 PRN for Severe agitation  -Lorazepam 2mg Q12 PRN for agitation  -Hydroxyzine 25mg Q12 PRN for anxiety    -Tylenol PRN for pain  -Pantoprazole for GERD  -Maalox PRN for dyspepsia    -EKG done 63 year old female , domiciled in nursing home, with history of COPD, DVT, bipolar, and dementia, multiple prior inpatient psych, was at [place] for 2 years, recently discharged from inpatient psych [facility] transferred to [facility], sent to the ER for continuous medication non-compliant for the past months, worsening psychosis and agitation.    Patient presents with decompensated psychiatric symptoms, secondary to medication non-compliance. Disorganized behavior noted and underlying psychosis appears to be impairing patient's judgment and behavior. Patient appears to be a danger to herself at this time due to patient unable to care for herself at this time. Patient would benefit form IPP for safety and stabilization.    Patient continues to be intermittently irritable, agitated, disruptive, verbally and physically aggressive toward staff. Thoughts continue to be disorganized, illogical. Patient continues to present with paranoid delusions.  Continues to present with poor insight and judgement.  Denies depression and anxiety. Denies suicidal/homicidal ideations. Denies A/V hallucinations.    #Admit 2PC (9:27)    Plan:    #Schizophrenia  -Haldol 4mg Q12 PO, increase to 5mg Q12  -TOO in place Haldol 4mg Q12 IM if patient refuses PO, increase to 5mg Q12    #Tobacco Use Disorder  -Nicotine Gum PRN    -Haldol 2mg Q8 PRN for Severe agitation  -Lorazepam 2mg Q12 PRN for agitation  -Hydroxyzine 25mg Q12 PRN for anxiety    -Tylenol PRN for pain  -Pantoprazole for GERD  -Maalox PRN for dyspepsia    -EKG done 63 year old female , domiciled in nursing home, with history of COPD, DVT, bipolar, and dementia, multiple prior inpatient psych, was at [place] for 2 years, recently discharged from inpatient psych [facility] transferred to [facility], sent to the ER for continuous medication non-compliant for the past months, worsening psychosis and agitation.    Patient presents with decompensated psychiatric symptoms, secondary to medication non-compliance. Disorganized behavior noted and underlying psychosis appears to be impairing patient's judgment and behavior. Patient appears to be a danger to herself at this time due to patient unable to care for herself at this time. Patient would benefit form IPP for safety and stabilization.    Patient continues to be intermittently irritable, agitated, disruptive, verbally and physically aggressive toward staff. Thoughts continue to be disorganized, illogical. Patient continues to present with paranoid delusions.  Continues to present with poor insight and judgement.  Denies depression and anxiety. Denies suicidal/homicidal ideations. Denies A/V hallucinations.    #Admit 2PC (9:27)    Plan:    #Schizophrenia  -increase Haldol to 5mg Q12  -TOO in place increase Haldol IM to 5mg Q12 if patient refuses PO     #Tobacco Use Disorder  -Nicotine Gum PRN    -Haldol 2mg Q8 PRN for Severe agitation  -Lorazepam 2mg Q12 PRN for agitation  -Hydroxyzine 25mg Q12 PRN for anxiety    -Tylenol PRN for pain  -Pantoprazole for GERD  -Maalox PRN for dyspepsia    -EKG done

## 2020-09-16 NOTE — PROGRESS NOTE BEHAVIORAL HEALTH - NSBHCHARTREVIEWVS_PSY_A_CORE FT
Vital Signs Last 24 Hrs  T(C): --  T(F): --  HR: 74 (15 Sep 2020 16:30) (74 - 74)  BP: 114/60 (15 Sep 2020 16:30) (114/60 - 114/60)  BP(mean): --  RR: 16 (15 Sep 2020 16:30) (16 - 16)  SpO2: --

## 2020-09-16 NOTE — PROGRESS NOTE BEHAVIORAL HEALTH - NSBHFUPINTERVALHXFT_PSY_A_CORE
Patient seen and evaluated, chart reviewed. As per nursing report no acute events over night. Patient continues to be intermittently irritable, agitated, disruptive, verbally and physically aggressive toward staff requiring PRN'S. Periodically refusing vitals. Reluctantly takes po meds at times with encouragement. TOO in place to ensure medication compliance. On evaluation patient continues to have impaired concentration. Thoughts continue to be disorganized and illogical. Patient continues to present with paranoid delusions. Continues to present with poor insight and judgement. Denies depression and anxiety. Denies suicidal/homicidal ideations. Denies A/V hallucinations. Patient seen and evaluated, chart reviewed. As per nursing report no acute events over night. Patient continues to be intermittently irritable, agitated, disruptive, verbally and physically aggressive toward staff requiring PRN'S. Periodically refusing vitals. Reluctantly takes po meds at times with encouragement. TOO in place to ensure medication compliance. On evaluation patient appeared irritable, stating "im tired"  Patient continues to have impaired concentration. Thoughts continue to be disorganized and illogical. Patient continues to present with paranoid delusions. Continues to present with poor insight and judgement. Denies depression and anxiety. Denies suicidal/homicidal ideations. Denies A/V hallucinations. Patient seen and evaluated, chart reviewed. As per nursing report no acute events over night. Patient continues to be intermittently irritable, agitated, disruptive, verbally and physically aggressive toward staff requiring PRN'S. Periodically refusing vitals. Reluctantly takes po meds at times with encouragement. TOO in place to ensure medication compliance. On evaluation patient appeared irritable, stating "im tired"  Patient continues to have impaired concentration. Thoughts continue to be disorganized and illogical. Patient continues to present with paranoid delusions. Continues to present with poor insight and judgement. Denies depression and anxiety. Denies suicidal/homicidal ideations. Denies A/V hallucinations.    Increase Haldol to 5mg Q12. Haldol dec next week. Patient seen and evaluated, chart reviewed. As per nursing report no acute events over night. Patient continues to be intermittently irritable, agitated, disruptive, verbally and physically aggressive toward staff requiring PRN'S. Periodically refusing vitals. Reluctantly takes po meds at times with encouragement. TOO in place to ensure medication compliance. On evaluation patient appeared irritable, stating "im tired"  Patient continues to have impaired concentration. Thoughts continue to be disorganized and illogical. Patient continues to present with paranoid delusions. Continues to present with poor insight and judgement. Denies depression and anxiety. Denies suicidal/homicidal ideations. Denies A/V hallucinations.    Increase Haldol to 5mg Q12. Possible Haldol dec next week.

## 2020-09-17 PROCEDURE — 99231 SBSQ HOSP IP/OBS SF/LOW 25: CPT

## 2020-09-17 RX ORDER — HYDROXYZINE HCL 10 MG
25 TABLET ORAL EVERY 12 HOURS
Refills: 0 | Status: DISCONTINUED | OUTPATIENT
Start: 2020-09-17 | End: 2020-10-19

## 2020-09-17 RX ADMIN — Medication 1 MILLIGRAM(S): at 22:49

## 2020-09-17 RX ADMIN — HALOPERIDOL DECANOATE 5 MILLIGRAM(S): 100 INJECTION INTRAMUSCULAR at 20:34

## 2020-09-17 NOTE — PROGRESS NOTE BEHAVIORAL HEALTH - NSBHFUPINTERVALHXFT_PSY_A_CORE
Patient seen and evaluated, chart reviewed. As per nursing report required PRN's for sleep. Patient continues to be intermittently irritable, agitated, disruptive, verbally and physically aggressive toward staff requiring PRN'S. Periodically refusing vitals. Reluctantly takes po meds at times with encouragement. TOO in place to ensure medication compliance. On evaluation patient appeared calm and cooperative, stating "My  wants me home" Patient continues to have impaired concentration. Thoughts continue to be disorganized and illogical. Patient continues to present with paranoid delusions. Continues to present with poor insight and judgement. Haldol increased. No side effects/adverse reactions noted. Possible Haldol dec next week. Denies depression and anxiety. Denies suicidal/homicidal ideations. Denies A/V hallucinations.

## 2020-09-17 NOTE — PROGRESS NOTE BEHAVIORAL HEALTH - SUMMARY
63 year old female , domiciled in nursing home, with history of COPD, DVT, bipolar, and dementia, multiple prior inpatient psych, was at [place] for 2 years, recently discharged from inpatient psych [facility] transferred to [facility], sent to the ER for continuous medication non-compliant for the past months, worsening psychosis and agitation.    Patient presents with decompensated psychiatric symptoms, secondary to medication non-compliance. Disorganized behavior noted and underlying psychosis appears to be impairing patient's judgment and behavior. Patient appears to be a danger to herself at this time due to patient unable to care for herself at this time. Patient would benefit form IPP for safety and stabilization.    Patient continues to be intermittently irritable, agitated, disruptive, verbally and physically aggressive toward staff. Thoughts continue to be disorganized, illogical. Patient continues to present with paranoid delusions.  Continues to present with poor insight and judgement.  Denies depression and anxiety. Denies suicidal/homicidal ideations. Denies A/V hallucinations.    #Admit 2PC (9:27)    Plan:    #Schizophrenia  -Haldol to 5mg Q12  -TOO in place Haldol IM to 5mg Q12 if patient refuses PO     #Tobacco Use Disorder  -Nicotine Gum PRN    -Haldol 2mg Q8 PRN for Severe agitation  -Lorazepam 2mg Q12 PRN for agitation  -Hydroxyzine 25mg Q12 PRN for anxiety    -Tylenol PRN for pain  -Pantoprazole for GERD  -Maalox PRN for dyspepsia    -EKG done 63 year old female , domiciled in nursing home, with history of COPD, DVT, bipolar, and dementia, multiple prior inpatient psych, was at [place] for 2 years, recently discharged from inpatient psych [facility] transferred to [facility], sent to the ER for continuous medication non-compliant for the past months, worsening psychosis and agitation.    Patient presents with decompensated psychiatric symptoms, secondary to medication non-compliance. Disorganized behavior noted and underlying psychosis appears to be impairing patient's judgment and behavior. Patient appears to be a danger to herself at this time due to patient unable to care for herself at this time. Patient would benefit form IPP for safety and stabilization.    Patient continues to be intermittently irritable, agitated, disruptive, verbally and physically aggressive toward staff. Thoughts continue to be disorganized, illogical. Patient continues to present with paranoid delusions.  Continues to present with poor insight and judgement.  Denies depression and anxiety. Denies suicidal/homicidal ideations. Denies A/V hallucinations.    #Admit 2PC (9:27)    Plan:    #Schizophrenia  -Haldol 5mg Q12  -TOO in place Haldol IM 5mg Q12 if patient refuses PO     #Tobacco Use Disorder  -Nicotine Gum PRN    -Haldol 2mg Q8 PRN for Severe agitation  -Lorazepam 2mg Q12 PRN for Physical aggression  -Hydroxyzine 25mg Q12 PRN for anxiety    -Tylenol PRN for pain  -Pantoprazole for GERD  -Maalox PRN for dyspepsia

## 2020-09-18 PROCEDURE — 99231 SBSQ HOSP IP/OBS SF/LOW 25: CPT

## 2020-09-18 RX ORDER — HALOPERIDOL DECANOATE 100 MG/ML
5 INJECTION INTRAMUSCULAR EVERY 12 HOURS
Refills: 0 | Status: DISCONTINUED | OUTPATIENT
Start: 2020-09-18 | End: 2020-10-08

## 2020-09-18 RX ORDER — HALOPERIDOL DECANOATE 100 MG/ML
5 INJECTION INTRAMUSCULAR EVERY 12 HOURS
Refills: 0 | Status: DISCONTINUED | OUTPATIENT
Start: 2020-09-18 | End: 2020-10-19

## 2020-09-18 RX ADMIN — HALOPERIDOL DECANOATE 5 MILLIGRAM(S): 100 INJECTION INTRAMUSCULAR at 08:16

## 2020-09-18 RX ADMIN — HALOPERIDOL DECANOATE 5 MILLIGRAM(S): 100 INJECTION INTRAMUSCULAR at 20:19

## 2020-09-18 NOTE — PROGRESS NOTE BEHAVIORAL HEALTH - NSBHFUPINTERVALHXFT_PSY_A_CORE
Patient seen and evaluated, chart reviewed. As per nursing report required PRN's for sleep. Patient continues to be intermittently irritable, agitated, disruptive, verbally and physically aggressive toward staff requiring PRN'S. Periodically refusing vitals. Reluctantly takes po meds at times with encouragement. TOO in place to ensure medication compliance. On evaluation patient appeared calm and cooperative, stating "When am I leaving" Patient continues to have impaired concentration. Thoughts continue to be disorganized and illogical. Patient continues to present with paranoid delusions. Continues to present with poor insight and judgement. Possible Haldol dec next week. Denies depression and anxiety. Denies suicidal/homicidal ideations. Denies A/V hallucinations. Patient seen and evaluated, chart reviewed. As per nursing report required PRN's for sleep. Patient continues to be intermittently irritable, agitated, disruptive, verbally and physically aggressive toward staff requiring PRN'S. Periodically refusing vitals. Reluctantly takes po meds at times with encouragement. TOO in place to ensure medication compliance. On evaluation patient appeared calm and cooperative, stating "When am I leaving" Patient continues to have impaired concentration. Thoughts continue to be disorganized and illogical. Patient continues to present with paranoid delusions. Continues to present with poor insight and judgement. Possible Haldol dec next week. Denies depression and anxiety. Denies suicidal/homicidal ideations. Denies A/V hallucinations.    Placed call to patients daughter Hannah Frazier [phone]. Updated her on patients care, treatment and progress. No answer, left message. Patient seen and evaluated, chart reviewed. As per nursing report required PRN's for sleep. Patient continues to be intermittently irritable, agitated, disruptive, verbally and physically aggressive toward staff requiring PRN'S. Periodically refusing vitals. Reluctantly takes po meds at times with encouragement. TOO in place to ensure medication compliance. On evaluation patient appeared calm and cooperative, stating "When am I leaving" Patient continues to have impaired concentration. Thoughts continue to be disorganized and illogical. Patient continues to present with paranoid delusions. Continues to present with poor insight and judgement. Possible Haldol dec next week. Denies depression and anxiety. Denies suicidal/homicidal ideations. Denies A/V hallucinations.    Placed call to patients daughter Hannah Frazier [phone], to update her on patients care, treatment and progress. No answer, left message.

## 2020-09-18 NOTE — PROGRESS NOTE BEHAVIORAL HEALTH - SUMMARY
63 year old female , domiciled in nursing home, with history of COPD, DVT, bipolar, and dementia, multiple prior inpatient psych, was at [place] for 2 years, recently discharged from inpatient psych [facility] transferred to [facility], sent to the ER for continuous medication non-compliant for the past months, worsening psychosis and agitation.    Patient presents with decompensated psychiatric symptoms, secondary to medication non-compliance. Disorganized behavior noted and underlying psychosis appears to be impairing patient's judgment and behavior. Patient appears to be a danger to herself at this time due to patient unable to care for herself at this time. Patient would benefit form IPP for safety and stabilization.    Patient continues to be intermittently irritable, agitated, disruptive, verbally and physically aggressive toward staff. Thoughts continue to be disorganized, illogical. Patient continues to present with paranoid delusions.  Continues to present with poor insight and judgement.  Denies depression and anxiety. Denies suicidal/homicidal ideations. Denies A/V hallucinations.    #Admit 2PC (9:27)    Plan:    #Schizophrenia  -Haldol 5mg Q12  -TOO in place Haldol IM 5mg Q12 if patient refuses PO     #Tobacco Use Disorder  -Nicotine Gum PRN    -Haldol 2mg Q8 PRN for Severe agitation  -Lorazepam 2mg Q12 PRN for Physical aggression  -Hydroxyzine 25mg Q12 PRN for anxiety    -Tylenol PRN for pain  -Pantoprazole for GERD  -Maalox PRN for dyspepsia

## 2020-09-18 NOTE — CHART NOTE - NSCHARTNOTEFT_GEN_A_CORE
Treatment met with pt. to review treatment plan, medications and discharge plan.  Pt. continues to present as preoccupied with discharge.  She continues to be verbally and physically aggressive towards staff.  She continues to present as disorganized, delusional and illogical.  Pt. does not engage in meaningful dialogue due to her symptoms.  She continues to require encouragement to take her medication.  Pt. is active and present on the unit.  She denies any suicidal or homicidal ideations.    Once stable for discharge, pt. will be referred back to the [facility].  There are no known discharge barriers at this time.  Pt. is not psychiatrically stable for discharge at this time.

## 2020-09-19 RX ADMIN — HALOPERIDOL DECANOATE 5 MILLIGRAM(S): 100 INJECTION INTRAMUSCULAR at 20:05

## 2020-09-19 RX ADMIN — HALOPERIDOL DECANOATE 5 MILLIGRAM(S): 100 INJECTION INTRAMUSCULAR at 08:14

## 2020-09-20 RX ADMIN — HALOPERIDOL DECANOATE 5 MILLIGRAM(S): 100 INJECTION INTRAMUSCULAR at 20:22

## 2020-09-20 RX ADMIN — HALOPERIDOL DECANOATE 5 MILLIGRAM(S): 100 INJECTION INTRAMUSCULAR at 08:52

## 2020-09-20 RX ADMIN — Medication 1 MILLIGRAM(S): at 21:02

## 2020-09-21 PROCEDURE — 99231 SBSQ HOSP IP/OBS SF/LOW 25: CPT

## 2020-09-21 RX ORDER — HALOPERIDOL DECANOATE 100 MG/ML
100 INJECTION INTRAMUSCULAR ONCE
Refills: 0 | Status: COMPLETED | OUTPATIENT
Start: 2020-09-21 | End: 2020-09-21

## 2020-09-21 RX ORDER — HALOPERIDOL DECANOATE 100 MG/ML
2 INJECTION INTRAMUSCULAR EVERY 8 HOURS
Refills: 0 | Status: DISCONTINUED | OUTPATIENT
Start: 2020-09-21 | End: 2020-10-21

## 2020-09-21 RX ADMIN — HALOPERIDOL DECANOATE 100 MILLIGRAM(S): 100 INJECTION INTRAMUSCULAR at 11:40

## 2020-09-21 RX ADMIN — HALOPERIDOL DECANOATE 5 MILLIGRAM(S): 100 INJECTION INTRAMUSCULAR at 08:04

## 2020-09-21 RX ADMIN — HALOPERIDOL DECANOATE 5 MILLIGRAM(S): 100 INJECTION INTRAMUSCULAR at 20:22

## 2020-09-21 NOTE — PROGRESS NOTE BEHAVIORAL HEALTH - NSBHFUPINTERVALHXFT_PSY_A_CORE
Patient seen and evaluated, chart reviewed. As per nursing report required PRN's for anxiety/agitation. Patient continues to be intermittently irritable, agitated, disruptive, verbally and physically aggressive toward staff requiring PRN'S. Periodically refusing vitals. Reluctantly takes po meds at times with encouragement. TOO in place to ensure medication compliance. On evaluation patient appeared calm and cooperative, stating "There were fumes and gas everywhere. I called the fire department. They had masks on" Patient continues to have impaired concentration. Thoughts continue to be disorganized and illogical. Patient continues to present with paranoid delusions. Continues to present with poor insight and judgement. Haldol Dec order put in. Denies depression and anxiety. Denies suicidal/homicidal ideations. Denies A/V hallucinations. Patient seen and evaluated, chart reviewed. As per nursing report required PRN's for anxiety/agitation. Patient continues to be intermittently irritable, agitated, disruptive, verbally and physically aggressive toward staff requiring PRN'S. Periodically refusing vitals. Reluctantly takes po meds at times with encouragement. TOO in place to ensure medication compliance. On evaluation patient appeared calm and cooperative, stating "There were fumes and gas everywhere. I called the fire department. They had masks on" Patient continues to have impaired concentration. Thoughts continue to be disorganized and illogical. Patient continues to present with paranoid delusions. Continues to present with poor insight and judgement. Haldol Dec given. Denies depression and anxiety. Denies suicidal/homicidal ideations. Denies A/V hallucinations. Patient seen and evaluated, chart reviewed. As per nursing report required PRN's for anxiety/agitation. Patient continues to be intermittently irritable, agitated, disruptive, verbally and physically aggressive toward staff requiring PRN'S. Periodically refusing vitals. Reluctantly takes po meds at times with encouragement. TOO in place to ensure medication compliance. On evaluation patient appeared anxious, stating "There were fumes and gas everywhere. I called the fire department. They had masks on" Patient continues to have impaired concentration. Thoughts continue to be disorganized and illogical. Patient continues to present with paranoid delusions. Continues to present with poor insight and judgement. Haldol Dec given. Denies depression and anxiety. Denies suicidal/homicidal ideations. Denies A/V hallucinations.    #Handoff  -Haldol dec given today 9/21

## 2020-09-21 NOTE — CHART NOTE - NSCHARTNOTEFT_GEN_A_CORE
The treatment team met with pt. to review treatment plan, medication and discharge plan.  Pt. called the fire department on this date.  She reports smelling "smoke and fumes everywhere".  As per staff report, pt. required several PRN's due to irritability and agitation.  She has reportedly been verbally and physically aggressive towards staff.  Writer is unable to engage in any meaningful dialogue with pt. due to the acuteness of her illness.  Pt. will only discuss discharge and becomes agitated when told there is no discharge date at this time.  Pt. continues to require encouragement to take her medications.    Pt. will be referred back to her residence at the [facility] once she is stable for discharge.  If pt. is not accepted back, she may be referred for state placement and/or other SNF's will be explored.    Mental Status Exam:    Mood-  Irritable    Sleep-  Normal    Appetite-  Normal    ADL's-  Poor/Fair    Thought Process-  Disorganized/Illogical/Impaired Reasoning    Observation-  Routine    Pt. is not psychiatrically stable for discharge at this time.

## 2020-09-21 NOTE — PROGRESS NOTE BEHAVIORAL HEALTH - SUMMARY
63 year old female , domiciled in nursing home, with history of COPD, DVT, bipolar, and dementia, multiple prior inpatient psych, was at [place] for 2 years, recently discharged from inpatient psych [facility] transferred to [facility], sent to the ER for continuous medication non-compliant for the past months, worsening psychosis and agitation.    Patient presents with decompensated psychiatric symptoms, secondary to medication non-compliance. Disorganized behavior noted and underlying psychosis appears to be impairing patient's judgment and behavior. Patient appears to be a danger to herself at this time due to patient unable to care for herself at this time. Patient would benefit form IPP for safety and stabilization.    Patient continues to be intermittently irritable, agitated, disruptive, verbally and physically aggressive toward staff. Thoughts continue to be disorganized, illogical. Patient continues to present with paranoid delusions.  Continues to present with poor insight and judgement.  Denies depression and anxiety. Denies suicidal/homicidal ideations. Denies A/V hallucinations.    #Admit 2PC (9:27)    Plan:    #Schizophrenia  -Haldol 5mg Q12  -TOO in place Haldol IM 5mg Q12 if patient refuses PO   -Haldol dec order put in    #Tobacco Use Disorder  -Nicotine Gum PRN    -Haldol 2mg Q8 PRN for Severe agitation  -Lorazepam 2mg Q12 PRN for Physical aggression  -Hydroxyzine 25mg Q12 PRN for anxiety    -Tylenol PRN for pain  -Pantoprazole for GERD  -Maalox PRN for dyspepsia 63 year old female , domiciled in nursing home, with history of COPD, DVT, bipolar, and dementia, multiple prior inpatient psych, was at [place] for 2 years, recently discharged from inpatient psych [facility] transferred to [facility], sent to the ER for continuous medication non-compliant for the past months, worsening psychosis and agitation.    Patient presents with decompensated psychiatric symptoms, secondary to medication non-compliance. Disorganized behavior noted and underlying psychosis appears to be impairing patient's judgment and behavior. Patient appears to be a danger to herself at this time due to patient unable to care for herself at this time. Patient would benefit form IPP for safety and stabilization.    Patient continues to be intermittently irritable, agitated, disruptive, verbally and physically aggressive toward staff. Thoughts continue to be disorganized, illogical. Patient continues to present with paranoid delusions.  Continues to present with poor insight and judgement.  Denies depression and anxiety. Denies suicidal/homicidal ideations. Denies A/V hallucinations.    #Admit 2PC (9:27)    Plan:    #Schizophrenia  -Haldol 5mg Q12  -TOO in place Haldol IM 5mg Q12 if patient refuses PO   -Haldol dec given 9/21/20    #Tobacco Use Disorder  -Nicotine Gum PRN    -Haldol 2mg Q8 PRN for Severe agitation  -Lorazepam 2mg Q12 PRN for Physical aggression  -Hydroxyzine 25mg Q12 PRN for anxiety    -Tylenol PRN for pain  -Pantoprazole for GERD  -Maalox PRN for dyspepsia 63 year old female , domiciled in nursing home, with history of COPD, DVT, bipolar, and dementia, multiple prior inpatient psych, was at [place] for 2 years, recently discharged from inpatient psych [facility] transferred to [facility], sent to the ER for continuous medication non-compliant for the past months, worsening psychosis and agitation.    Patient presents with decompensated psychiatric symptoms, secondary to medication non-compliance. Disorganized behavior noted and underlying psychosis appears to be impairing patient's judgment and behavior. Patient appears to be a danger to herself at this time due to patient unable to care for herself at this time. Patient would benefit form IPP for safety and stabilization.    Patient continues to be intermittently irritable, agitated, disruptive, verbally and physically aggressive toward staff. Thoughts continue to be disorganized, illogical. Patient continues to present with paranoid delusions.  Continues to present with poor insight and judgement.  Denies depression and anxiety. Denies suicidal/homicidal ideations. Denies A/V hallucinations.    #Admit 2PC (9:27)    Plan:    #Schizophrenia  -Haldol 5mg Q12  -TOO in place Haldol IM 5mg Q12 if patient refuses PO   -Haldol dec given 9/21/20    #Tobacco Use Disorder  -Nicotine Gum PRN    -Haldol 2mg Q8 PRN for Severe agitation  -Lorazepam 2mg Q12 PRN for Physical aggression  -Hydroxyzine 25mg Q12 PRN for anxiety    -Tylenol PRN for pain  -Pantoprazole for GERD  -Maalox PRN for dyspepsia    #Handoff  -Haldol dec given today 9/21

## 2020-09-21 NOTE — PROGRESS NOTE BEHAVIORAL HEALTH - NSBHCHARTREVIEWVS_PSY_A_CORE FT
Vital Signs Last 24 Hrs  T(C): 36.4 (21 Sep 2020 09:00), Max: 36.4 (21 Sep 2020 09:00)  T(F): 97.6 (21 Sep 2020 09:00), Max: 97.6 (21 Sep 2020 09:00)  HR: 93 (21 Sep 2020 09:00) (74 - 93)  BP: 167/76 (21 Sep 2020 09:00) (130/69 - 167/76)  BP(mean): --  RR: 18 (21 Sep 2020 09:00) (18 - 18)  SpO2: --

## 2020-09-22 PROCEDURE — 99231 SBSQ HOSP IP/OBS SF/LOW 25: CPT

## 2020-09-22 RX ADMIN — Medication 1 MILLIGRAM(S): at 21:47

## 2020-09-22 RX ADMIN — HALOPERIDOL DECANOATE 5 MILLIGRAM(S): 100 INJECTION INTRAMUSCULAR at 08:23

## 2020-09-22 RX ADMIN — HALOPERIDOL DECANOATE 5 MILLIGRAM(S): 100 INJECTION INTRAMUSCULAR at 20:25

## 2020-09-22 NOTE — PROGRESS NOTE BEHAVIORAL HEALTH - SUMMARY
63 year old female , domiciled in nursing home, with history of COPD, DVT, bipolar, and dementia, multiple prior inpatient psych, was at [place] for 2 years, recently discharged from inpatient psych [facility] transferred to [facility], sent to the ER for continuous medication non-compliant for the past months, worsening psychosis and agitation.    Patient presents with decompensated psychiatric symptoms, secondary to medication non-compliance. Disorganized behavior noted and underlying psychosis appears to be impairing patient's judgment and behavior. Patient appears to be a danger to herself at this time due to patient unable to care for herself at this time. Patient would benefit form IPP for safety and stabilization.    Patient continues to be intermittently irritable, agitated, disruptive, verbally and physically aggressive toward staff. Thoughts continue to be disorganized, illogical. Patient continues to present with paranoid delusions.  Continues to present with poor insight and judgement.  Denies depression and anxiety. Denies suicidal/homicidal ideations. Denies A/V hallucinations.    #Admit 2PC (9:27)    Plan:    #Schizophrenia  -Haldol 5mg Q12  -TOO in place Haldol IM 5mg Q12 if patient refuses PO   -Haldol dec given 9/21/20    #Tobacco Use Disorder  -Nicotine Gum PRN    -Haldol 2mg Q8 PRN for Severe agitation  -Lorazepam 2mg Q12 PRN for Physical aggression  -Hydroxyzine 25mg Q12 PRN for anxiety    -Tylenol PRN for pain  -Pantoprazole for GERD  -Maalox PRN for dyspepsia    #Handoff  -Haldol dec given 9/21

## 2020-09-22 NOTE — PROGRESS NOTE BEHAVIORAL HEALTH - NSBHCHARTREVIEWVS_PSY_A_CORE FT
Vital Signs Last 24 Hrs  T(C): --  T(F): --  HR: 78 (21 Sep 2020 16:03) (78 - 78)  BP: 118/66 (21 Sep 2020 16:03) (118/66 - 118/66)  BP(mean): --  RR: 20 (21 Sep 2020 16:03) (20 - 20)  SpO2: --

## 2020-09-22 NOTE — PROGRESS NOTE BEHAVIORAL HEALTH - NSBHFUPINTERVALHXFT_PSY_A_CORE
Patient seen and evaluated, chart reviewed. As per nursing report Pt became irritable saying that she had already received a [place] today & so will not be taking any more med tonight. Pt still to get court-ordered meds tonight. Pt took PO after much encouragement but was very hostile & angry. Patient continues to be intermittently irritable, agitated, disruptive, verbally and physically aggressive toward staff requiring PRN'S. Periodically refusing vitals. Reluctantly takes po meds at times with encouragement. TOO in place to ensure medication compliance. On evaluation patient calm and cooperative, apologizing for last night. States " I didn't remember what you said about the medications." Writer explained that patient was given Haldol dec yesterday but for a period of time she still needs to take the PO Haldol. Patient verbalized understanding. Patient continues to have impaired concentration. Thoughts continue to be disorganized and illogical at times. Patient continues to present with paranoid delusions. Continues to present with poor insight and judgement. Denies depression and anxiety. Denies suicidal/homicidal ideations. Denies A/V hallucinations.    #Handoff  -Haldol dec given 9/21 Patient seen and evaluated, chart reviewed. As per nursing report from last night Pt became irritable saying that she had already received a [place] today & so will not be taking any more meds. Pt still to get court-ordered meds. Pt took PO after much encouragement but was very hostile & angry. Patient continues to be intermittently irritable, agitated, disruptive, verbally and physically aggressive toward staff requiring PRN'S. Periodically refusing vitals. Reluctantly takes po meds at times with encouragement. TOO in place to ensure medication compliance. On evaluation patient calm and cooperative, apologizing for last night. States " I didn't remember what you said about the medications." Writer explained that patient was given Haldol dec yesterday but for a period of time she still needs to take the PO Haldol. Patient verbalized understanding. Patient continues to have impaired concentration. Thoughts continue to be disorganized and illogical at times. Patient continues to present with paranoid delusions. Continues to present with poor insight and judgement. Denies depression and anxiety. Denies suicidal/homicidal ideations. Denies A/V hallucinations.    #Handoff  -Haldol dec given 9/21

## 2020-09-23 PROCEDURE — 99231 SBSQ HOSP IP/OBS SF/LOW 25: CPT

## 2020-09-23 RX ORDER — IPRATROPIUM/ALBUTEROL SULFATE 18-103MCG
3 AEROSOL WITH ADAPTER (GRAM) INHALATION EVERY 6 HOURS
Refills: 0 | Status: DISCONTINUED | OUTPATIENT
Start: 2020-09-23 | End: 2020-11-23

## 2020-09-23 RX ADMIN — Medication 1 MILLIGRAM(S): at 13:43

## 2020-09-23 RX ADMIN — HALOPERIDOL DECANOATE 5 MILLIGRAM(S): 100 INJECTION INTRAMUSCULAR at 08:24

## 2020-09-23 RX ADMIN — HALOPERIDOL DECANOATE 2 MILLIGRAM(S): 100 INJECTION INTRAMUSCULAR at 13:43

## 2020-09-23 RX ADMIN — HALOPERIDOL DECANOATE 5 MILLIGRAM(S): 100 INJECTION INTRAMUSCULAR at 21:05

## 2020-09-23 NOTE — PROGRESS NOTE BEHAVIORAL HEALTH - SUMMARY
63 year old female , domiciled in nursing home, with history of COPD, DVT, bipolar, and dementia, multiple prior inpatient psych, was at [place] for 2 years, recently discharged from inpatient psych [facility] transferred to [facility], sent to the ER for continuous medication non-compliant for the past months, worsening psychosis and agitation.    Patient presents with decompensated psychiatric symptoms, secondary to medication non-compliance. Disorganized behavior noted and underlying psychosis appears to be impairing patient's judgment and behavior. Patient appears to be a danger to herself at this time due to patient unable to care for herself at this time. Patient would benefit form IPP for safety and stabilization.    Patient continues to be intermittently irritable, agitated, disruptive, verbally and physically aggressive toward staff. Thoughts continue to be disorganized, illogical. Patient continues to present with paranoid delusions.  Continues to present with poor insight and judgement.  Denies depression and anxiety. Denies suicidal/homicidal ideations. Denies A/V hallucinations.    #Admit 2PC (9:27)    Plan:    #Schizophrenia  -Haldol 5mg Q12  -TOO in place Haldol IM 5mg Q12 if patient refuses PO   -Haldol dec given 9/21/20    #Tobacco Use Disorder  -Nicotine Gum PRN    -Haldol 2mg Q8 PRN for Severe agitation  -Lorazepam 2mg Q12 PRN for Physical aggression  -Hydroxyzine 25mg Q12 PRN for anxiety    -Tylenol PRN for pain  -Pantoprazole for GERD  -Maalox PRN for dyspepsia    #Handoff  -Haldol dec given 9/21 63 year old female , domiciled in nursing home, with history of COPD, DVT, bipolar, and dementia, multiple prior inpatient psych, was at [place] for 2 years, recently discharged from inpatient psych [facility] transferred to [facility], sent to the ER for continuous medication non-compliant for the past months, worsening psychosis and agitation.    Patient presents with decompensated psychiatric symptoms, secondary to medication non-compliance. Disorganized behavior noted and underlying psychosis appears to be impairing patient's judgment and behavior. Patient appears to be a danger to herself at this time due to patient unable to care for herself at this time. Patient would benefit form IPP for safety and stabilization.    Patient continues to be intermittently irritable, agitated, disruptive, verbally and physically aggressive toward staff. Thoughts continue to be disorganized, illogical. Patient continues to present with paranoid delusions.  Continues to present with poor insight and judgement.  Denies depression and anxiety. Denies suicidal/homicidal ideations. Denies A/V hallucinations.    #Admit 2PC (9:27)    Plan:    #Schizophrenia  -Haldol 5mg Q12  -TOO in place Haldol IM 5mg Q12 if patient refuses PO   -Haldol dec given 9/21/20    #Tobacco Use Disorder  -Nicotine Gum PRN    #COPD  -Duoneb PRN for SOB    -Haldol 2mg Q8 PRN for Severe agitation  -Lorazepam 2mg Q12 PRN for Physical aggression  -Hydroxyzine 25mg Q12 PRN for anxiety    -Tylenol PRN for pain  -Pantoprazole for GERD  -Maalox PRN for dyspepsia    #Handoff  -Haldol dec given 9/21  -Duoneb PRN for SOB

## 2020-09-23 NOTE — CHART NOTE - NSCHARTNOTEFT_GEN_A_CORE
Called by psych NP for patient w/ SOB, known history of COPD. Noncompliant with medications.    Patient seen at bedside, resting comfortably. No tachypnea noted. SPO2 on room air while at rest was 94%. Patient refused physical exam.   Patient states that she is having a little trouble breathing today, but believes there are "fumes" coming from the vents causing this.     Will check CBC in AM and order duonebs PRN. Patient known to be noncompliant with medications.
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
## 2020-09-23 NOTE — PROGRESS NOTE BEHAVIORAL HEALTH - NSBHCHARTREVIEWVS_PSY_A_CORE FT
Vital Signs Last 24 Hrs  T(C): --  T(F): --  HR: --  BP: --  BP(mean): --  RR: --  SpO2: -- Vital Signs Last 24 Hrs  T(C): --  T(F): --  HR: 85 (23 Sep 2020 12:29) (85 - 108)  BP: 146/65 (23 Sep 2020 10:41) (146/65 - 146/65)  BP(mean): --  RR: 16 (23 Sep 2020 10:41) (16 - 16)  SpO2: 94% (23 Sep 2020 12:29) (89% - 94%)

## 2020-09-24 PROCEDURE — 99231 SBSQ HOSP IP/OBS SF/LOW 25: CPT

## 2020-09-24 RX ADMIN — HALOPERIDOL DECANOATE 5 MILLIGRAM(S): 100 INJECTION INTRAMUSCULAR at 20:25

## 2020-09-24 RX ADMIN — HALOPERIDOL DECANOATE 5 MILLIGRAM(S): 100 INJECTION INTRAMUSCULAR at 08:46

## 2020-09-24 NOTE — PROGRESS NOTE BEHAVIORAL HEALTH - NSBHCHARTREVIEWVS_PSY_A_CORE FT
Vital Signs Last 24 Hrs- Patient refused  T(C): --  T(F): --  HR: 85 (23 Sep 2020 12:29) (85 - 85)  BP: --  BP(mean): --  RR: --  SpO2: 94% (23 Sep 2020 12:29) (94% - 94%)
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
## 2020-09-24 NOTE — PROGRESS NOTE BEHAVIORAL HEALTH - SUMMARY
63 year old female , domiciled in nursing home, with history of COPD, DVT, bipolar, and dementia, multiple prior inpatient psych, was at [place] for 2 years, recently discharged from inpatient psych [facility] transferred to [facility], sent to the ER for continuous medication non-compliant for the past months, worsening psychosis and agitation.    Patient presents with decompensated psychiatric symptoms, secondary to medication non-compliance. Disorganized behavior noted and underlying psychosis appears to be impairing patient's judgment and behavior. Patient appears to be a danger to herself at this time due to patient unable to care for herself at this time. Patient would benefit form IPP for safety and stabilization.    Patient continues to be intermittently irritable, agitated, disruptive, verbally and physically aggressive toward staff. Thoughts continue to be disorganized, illogical. Patient continues to present with paranoid delusions.  Continues to present with poor insight and judgement.  Denies depression and anxiety. Denies suicidal/homicidal ideations. Denies A/V hallucinations.    #Admit 2PC (9:27)    Plan:    #Schizophrenia  -Haldol 5mg Q12  -TOO in place Haldol IM 5mg Q12 if patient refuses PO   -Haldol dec given 9/21/20    #Tobacco Use Disorder  -Nicotine Gum PRN    #COPD  -Duoneb PRN for SOB    -Haldol 2mg Q8 PRN for Severe agitation  -Lorazepam 2mg Q12 PRN for Physical aggression  -Hydroxyzine 25mg Q12 PRN for anxiety    -Tylenol PRN for pain  -Pantoprazole for GERD  -Maalox PRN for dyspepsia    #Handoff  -Haldol dec given 9/21  -Duoneb PRN for SOB
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
## 2020-09-25 LAB
HCT VFR BLD CALC: 42.8 % — SIGNIFICANT CHANGE UP (ref 37–47)
HGB BLD-MCNC: 13.6 G/DL — SIGNIFICANT CHANGE UP (ref 12–16)
MCHC RBC-ENTMCNC: 28.5 PG — SIGNIFICANT CHANGE UP (ref 27–31)
MCHC RBC-ENTMCNC: 31.8 G/DL — LOW (ref 32–37)
MCV RBC AUTO: 89.5 FL — SIGNIFICANT CHANGE UP (ref 81–99)
NRBC # BLD: 0 /100 WBCS — SIGNIFICANT CHANGE UP (ref 0–0)
PLATELET # BLD AUTO: 216 K/UL — SIGNIFICANT CHANGE UP (ref 130–400)
RBC # BLD: 4.78 M/UL — SIGNIFICANT CHANGE UP (ref 4.2–5.4)
RBC # FLD: 13.4 % — SIGNIFICANT CHANGE UP (ref 11.5–14.5)
WBC # BLD: 9.3 K/UL — SIGNIFICANT CHANGE UP (ref 4.8–10.8)
WBC # FLD AUTO: 9.3 K/UL — SIGNIFICANT CHANGE UP (ref 4.8–10.8)

## 2020-09-25 PROCEDURE — 99231 SBSQ HOSP IP/OBS SF/LOW 25: CPT

## 2020-09-25 RX ADMIN — HALOPERIDOL DECANOATE 5 MILLIGRAM(S): 100 INJECTION INTRAMUSCULAR at 08:08

## 2020-09-25 RX ADMIN — HALOPERIDOL DECANOATE 5 MILLIGRAM(S): 100 INJECTION INTRAMUSCULAR at 20:03

## 2020-09-25 NOTE — PROGRESS NOTE BEHAVIORAL HEALTH - NSBHCHARTREVIEWVS_PSY_A_CORE FT
Vital Signs Last 24 Hrs  T(C): --  T(F): --  HR: 86 (25 Sep 2020 10:27) (84 - 86)  BP: 158/77 (25 Sep 2020 10:27) (158/77 - 158/77)  BP(mean): --  RR: 20 (25 Sep 2020 10:27) (20 - 20)  SpO2: 92% (25 Sep 2020 07:59) (92% - 93%)

## 2020-09-25 NOTE — PROGRESS NOTE BEHAVIORAL HEALTH - NSBHFUPINTERVALHXFT_PSY_A_CORE
Patient seen and evaluated, chart reviewed. As per nursing report patient received PRN for anxiety with good effect. Refused blood work this morning. Patient continues to be intermittently irritable, agitated, disruptive, verbally and physically aggressive toward staff requiring PRN'S. Periodically refusing vitals. Reluctantly takes po meds at times with encouragement. TOO in place to ensure medication compliance. On evaluation patient calm and cooperative. Denies SOB at this time. No s/s of SOB noted. Denied pain. No s/s of pain noted. Writer talked to patient about refusing blood work this morning. Patient agreed to do the blood work this afternoon. Writer will put order back in. Patient continues to have impaired concentration. Thoughts continue to be disorganized and illogical at times. Patient continues to present with paranoid delusions. Continues to present with poor insight and judgement. Denies depression and anxiety. Denies suicidal/homicidal ideations. Denies A/V hallucinations.    Bloodwork?    #Handoff  -Haldol dec given 9/21  -Duoneb PRN for SOB Patient seen and evaluated, chart reviewed. As per nursing report patient received PRN for anxiety with good effect. Refused blood work this morning. Patient continues to be intermittently irritable, agitated, disruptive, verbally and physically aggressive toward staff requiring PRN'S. Periodically refusing vitals. Reluctantly takes po meds at times with encouragement. TOO in place to ensure medication compliance. On evaluation patient calm and cooperative. Denies SOB at this time. No s/s of SOB noted. Denied pain. No s/s of pain noted. Writer talked to patient about refusing blood work this morning. Patient agreed to do the blood work this afternoon. Writer will put order back in. Patient continues to have impaired concentration. Thoughts continue to be disorganized and illogical at times. Patient continues to present with paranoid delusions. Continues to present with poor insight and judgement. Denies depression and anxiety. Denies suicidal/homicidal ideations. Denies A/V hallucinations.    Patient later visible on the unit In good behavioral control engaging with peers, watching TV in TV room.    Blood work done. Writer present. Patient calm and cooperative, in good behavioral control.    #Handoff  -Haldol dec given 9/21  -Duoneb PRN for SOB Patient seen and evaluated, chart reviewed. As per nursing report patient received PRN for anxiety with good effect. Refused blood work this morning. Patient continues to be intermittently irritable, agitated, disruptive, verbally and physically aggressive toward staff requiring PRN'S. Periodically refusing vitals. Reluctantly takes po meds at times with encouragement. TOO in place to ensure medication compliance. On evaluation patient calm and cooperative. Denies SOB at this time. No s/s of SOB noted. Denied pain. No s/s of pain noted. Writer talked to patient about refusing blood work this morning. Patient agreed to do the blood work this afternoon. Writer will put order back in. Patient continues to have impaired concentration. Thoughts continue to be disorganized and illogical at times. Patient continues to present with paranoid delusions. Continues to present with poor insight and judgement. Denies depression and anxiety. Denies suicidal/homicidal ideations. Denies A/V hallucinations.    Patient later visible on the unit In good behavioral control engaging with peers, watching TV in TV room.    Blood work done. Writer present. Patient calm and cooperative, in good behavioral control.    #Handoff  -CBC drawn f/u with results  -Haldol dec given 9/21  -Duoneb PRN for SOB
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
## 2020-09-26 RX ADMIN — HALOPERIDOL DECANOATE 5 MILLIGRAM(S): 100 INJECTION INTRAMUSCULAR at 09:42

## 2020-09-26 RX ADMIN — HALOPERIDOL DECANOATE 5 MILLIGRAM(S): 100 INJECTION INTRAMUSCULAR at 20:17

## 2020-09-26 RX ADMIN — Medication 1 MILLIGRAM(S): at 23:30

## 2020-09-27 RX ADMIN — Medication 1 MILLIGRAM(S): at 23:18

## 2020-09-27 RX ADMIN — HALOPERIDOL DECANOATE 5 MILLIGRAM(S): 100 INJECTION INTRAMUSCULAR at 20:32

## 2020-09-27 RX ADMIN — HALOPERIDOL DECANOATE 5 MILLIGRAM(S): 100 INJECTION INTRAMUSCULAR at 08:19

## 2020-09-28 PROCEDURE — 99231 SBSQ HOSP IP/OBS SF/LOW 25: CPT

## 2020-09-28 RX ADMIN — HALOPERIDOL DECANOATE 5 MILLIGRAM(S): 100 INJECTION INTRAMUSCULAR at 08:43

## 2020-09-28 RX ADMIN — HALOPERIDOL DECANOATE 5 MILLIGRAM(S): 100 INJECTION INTRAMUSCULAR at 20:02

## 2020-09-28 NOTE — PROGRESS NOTE BEHAVIORAL HEALTH - NSBHFUPINTERVALHXFT_PSY_A_CORE
Patient seen and evaluated, chart reviewed. As per nursing report patient received PRN for anxiety with good effect. Patient had an uneventful good weekend. Patient continues to be intermittently irritable, agitated requiring PRN'S. Periodically refusing vitals. Reluctantly takes po meds at times with encouragement. TOO in place to ensure medication compliance. On evaluation patient calm and cooperative. Stating "I had a good weekend" Denies SOB at this time. No s/s of SOB noted. Denied pain. No s/s of pain noted. Patient continues to have impaired concentration. Thoughts continue to be disorganized and illogical at times. Patient continues to present with paranoid delusions. Continues to present with poor insight and judgement. Denies depression and anxiety. Denies suicidal/homicidal ideations. Denies A/V hallucinations.    #Handoff  -Haldol dec given 9/21  -Duoneb PRN for SOB Patient seen and evaluated, chart reviewed. As per nursing report patient received PRN for anxiety with good effect. Patient had an uneventful good weekend. Patient continues to be intermittently irritable, agitated requiring PRN'S. Periodically refusing vitals. Reluctantly takes po meds at times with encouragement. TOO in place to ensure medication compliance. On evaluation patient calm and cooperative, in good behavioral control. Stating "I had a good weekend" Denies SOB at this time. No s/s of SOB noted. Denied pain. No s/s of pain noted. Patient continues to have impaired concentration. Thoughts continue to be disorganized and illogical at times. Patient continues to present with paranoid delusions. Continues to present with poor insight and judgement. Denies depression and anxiety. Denies suicidal/homicidal ideations. Denies A/V hallucinations.    #Handoff  -Haldol dec given 9/21  -Duoneb PRN for SOB Patient seen and evaluated, chart reviewed. As per nursing report patient received PRN for anxiety with good effect. Patient had an uneventful good weekend. Patient continues to be intermittently irritable, agitated requiring PRN'S. Periodically refusing vitals. Reluctantly takes po meds at times with encouragement. TOO in place to ensure medication compliance. On evaluation patient calm and cooperative, in good behavioral control. Stating "I had a good weekend" Denies SOB at this time. No s/s of SOB noted. Denied pain. No s/s of pain noted. Patient continues to have impaired concentration. Thoughts continue to be disorganized and illogical at times. Patient continues to present with paranoid delusions. Continues to present with poor insight and judgement. Denies depression and anxiety. Denies suicidal/homicidal ideations. Denies A/V hallucinations.    Patient later visible on the unit in good behavioral control engaging with peers.     #Handoff  -Haldol dec given 9/21  -Duoneb PRN for SOB

## 2020-09-28 NOTE — PROGRESS NOTE BEHAVIORAL HEALTH - SUMMARY
63 year old female , domiciled in nursing home, with history of COPD, DVT, bipolar, and dementia, multiple prior inpatient psych, was at [place] for 2 years, recently discharged from inpatient psych [facility] transferred to [facility], sent to the ER for continuous medication non-compliant for the past months, worsening psychosis and agitation.    Patient presents with decompensated psychiatric symptoms, secondary to medication non-compliance. Disorganized behavior noted and underlying psychosis appears to be impairing patient's judgment and behavior. Patient appears to be a danger to herself at this time due to patient unable to care for herself at this time. Patient would benefit form IPP for safety and stabilization.    Patient continues to be intermittently irritable, agitated. Thoughts continue to be disorganized, illogical. Patient continues to present with paranoid delusions.  Continues to present with poor insight and judgement.  Denies depression and anxiety. Denies suicidal/homicidal ideations. Denies A/V hallucinations.    #Admit 2PC (9:27)    Plan:    #Schizophrenia  -Haldol 5mg Q12  -TOO in place Haldol IM 5mg Q12 if patient refuses PO   -Haldol dec given 9/21/20    #Tobacco Use Disorder  -Nicotine Gum PRN    #COPD  -Duoneb PRN for SOB    -Haldol 2mg Q8 PRN for Severe agitation  -Lorazepam 2mg Q12 PRN for Physical aggression  -Hydroxyzine 25mg Q12 PRN for anxiety    -Tylenol PRN for pain  -Pantoprazole for GERD  -Maalox PRN for dyspepsia    #Handoff  -Haldol dec given 9/21  -Duoneb PRN for SOB 63 year old female , domiciled in nursing home, with history of COPD, DVT, bipolar, and dementia, multiple prior inpatient psych, was at [place] for 2 years, recently discharged from inpatient psych [facility] transferred to [facility], sent to the ER for continuous medication non-compliant for the past months, worsening psychosis and agitation.    Patient presents with decompensated psychiatric symptoms, secondary to medication non-compliance. Disorganized behavior noted and underlying psychosis appears to be impairing patient's judgment and behavior. Patient appears to be a danger to herself at this time due to patient unable to care for herself at this time. Patient would benefit form IPP for safety and stabilization.    Patient continues to be intermittently irritable, agitated. Thoughts continue to be disorganized, illogical. Patient continues to present with paranoid delusions.  Continues to present with poor insight and judgement.  Denies depression and anxiety. Denies suicidal/homicidal ideations. Denies A/V hallucinations. Patient in good behavioral control. Visible on the unit engaging with peers.    #Admit 2PC (9:27)    Plan:    #Schizophrenia  -Haldol 5mg Q12  -TOO in place Haldol IM 5mg Q12 if patient refuses PO   -Haldol dec given 9/21/20    #Tobacco Use Disorder  -Nicotine Gum PRN    #COPD  -Duoneb PRN for SOB    -Haldol 2mg Q8 PRN for Severe agitation  -Lorazepam 2mg Q12 PRN for Physical aggression  -Hydroxyzine 25mg Q12 PRN for anxiety    -Tylenol PRN for pain  -Pantoprazole for GERD  -Maalox PRN for dyspepsia    #Handoff  -Haldol dec given 9/21  -Duoneb PRN for SOB

## 2020-09-29 PROCEDURE — 99231 SBSQ HOSP IP/OBS SF/LOW 25: CPT

## 2020-09-29 RX ADMIN — Medication 1 MILLIGRAM(S): at 23:40

## 2020-09-29 RX ADMIN — HALOPERIDOL DECANOATE 5 MILLIGRAM(S): 100 INJECTION INTRAMUSCULAR at 20:02

## 2020-09-29 RX ADMIN — HALOPERIDOL DECANOATE 5 MILLIGRAM(S): 100 INJECTION INTRAMUSCULAR at 08:13

## 2020-09-29 NOTE — PROGRESS NOTE BEHAVIORAL HEALTH - NSBHCHARTREVIEWVS_PSY_A_CORE FT
Vital Signs Last 24 Hrs  T(C): --  T(F): --  HR: 72 (28 Sep 2020 16:06) (72 - 72)  BP: 130/63 (28 Sep 2020 16:06) (130/63 - 130/63)  BP(mean): --  RR: 20 (28 Sep 2020 16:06) (20 - 20)  SpO2: --

## 2020-09-29 NOTE — PROGRESS NOTE BEHAVIORAL HEALTH - SUMMARY
63 year old female , domiciled in nursing home, with history of COPD, DVT, bipolar, and dementia, multiple prior inpatient psych, was at [place] for 2 years, recently discharged from inpatient psych [facility] transferred to [facility], sent to the ER for continuous medication non-compliant for the past months, worsening psychosis and agitation.    Patient presents with decompensated psychiatric symptoms, secondary to medication non-compliance. Disorganized behavior noted and underlying psychosis appears to be impairing patient's judgment and behavior. Patient appears to be a danger to herself at this time due to patient unable to care for herself at this time. Patient would benefit form IPP for safety and stabilization.    Thoughts continue to be disorganized, illogical. Patient continues to present with paranoid delusions.  Continues to present with poor insight and judgement.  Denies depression and anxiety. Denies suicidal/homicidal ideations. Denies A/V hallucinations.    #Admit 2PC (9:27)    Plan:    #Schizophrenia  -Haldol 5mg Q12  -TOO in place Haldol IM 5mg Q12 if patient refuses PO   -Haldol dec given 9/21/20    #Tobacco Use Disorder  -Nicotine Gum PRN    #COPD  -Duoneb PRN for SOB    -Haldol 2mg Q8 PRN for Severe agitation  -Lorazepam 2mg Q12 PRN for Physical aggression  -Hydroxyzine 25mg Q12 PRN for anxiety    -Tylenol PRN for pain  -Pantoprazole for GERD  -Maalox PRN for dyspepsia    #Handoff  -Haldol dec given 9/21  -Duoneb PRN for SOB 63 year old female , domiciled in nursing home, with history of COPD, DVT, bipolar, and dementia, multiple prior inpatient psych, was at [place] for 2 years, recently discharged from inpatient psych [facility] transferred to [facility], sent to the ER for continuous medication non-compliant for the past months, worsening psychosis and agitation.    Patient presents with decompensated psychiatric symptoms, secondary to medication non-compliance. Disorganized behavior noted and underlying psychosis appears to be impairing patient's judgment and behavior. Patient appears to be a danger to herself at this time due to patient unable to care for herself at this time. Patient would benefit form IPP for safety and stabilization.    Thoughts continue to be disorganized, illogical. Patient continues to present with paranoid delusions.  Continues to present with poor insight and judgement.  Denies depression and anxiety. Denies suicidal/homicidal ideations. Denies A/V hallucinations. Patient in good behavioral control. Visible on the unit engaging with peers.    #Admit 2PC (9:27)    Plan:    #Schizophrenia  -Haldol 5mg Q12  -TOO in place Haldol IM 5mg Q12 if patient refuses PO   -Haldol dec given 9/21/20    #Tobacco Use Disorder  -Nicotine Gum PRN    #COPD  -Duoneb PRN for SOB    -Haldol 2mg Q8 PRN for Severe agitation  -Lorazepam 2mg Q12 PRN for Physical aggression  -Hydroxyzine 25mg Q12 PRN for anxiety    -Tylenol PRN for pain  -Pantoprazole for GERD  -Maalox PRN for dyspepsia    #Handoff  -Haldol dec given 9/21  -Duoneb PRN for SOB

## 2020-09-30 PROCEDURE — 99231 SBSQ HOSP IP/OBS SF/LOW 25: CPT

## 2020-09-30 RX ADMIN — HALOPERIDOL DECANOATE 5 MILLIGRAM(S): 100 INJECTION INTRAMUSCULAR at 09:03

## 2020-09-30 RX ADMIN — PANTOPRAZOLE SODIUM 40 MILLIGRAM(S): 20 TABLET, DELAYED RELEASE ORAL at 09:03

## 2020-09-30 RX ADMIN — Medication 1 MILLIGRAM(S): at 23:14

## 2020-09-30 RX ADMIN — HALOPERIDOL DECANOATE 5 MILLIGRAM(S): 100 INJECTION INTRAMUSCULAR at 20:31

## 2020-09-30 NOTE — PROGRESS NOTE BEHAVIORAL HEALTH - SUMMARY
63 year old female , domiciled in nursing home, with history of COPD, DVT, bipolar, and dementia, multiple prior inpatient psych, was at [place] for 2 years, recently discharged from inpatient psych [facility] transferred to [facility], sent to the ER for continuous medication non-compliant for the past months, worsening psychosis and agitation.    Patient presents with decompensated psychiatric symptoms, secondary to medication non-compliance. Disorganized behavior noted and underlying psychosis appears to be impairing patient's judgment and behavior. Patient appears to be a danger to herself at this time due to patient unable to care for herself at this time. Patient would benefit form IPP for safety and stabilization.    Thoughts continue to be disorganized, illogical. Patient continues to present with paranoid delusions.  Continues to present with poor insight and judgement.  Denies depression and anxiety. Denies suicidal/homicidal ideations. Denies A/V hallucinations. Patient in good behavioral control. Visible on the unit engaging with peers.    #Admit 2PC (9:27)    Plan:    #Schizophrenia  -Haldol 5mg Q12  -TOO in place Haldol IM 5mg Q12 if patient refuses PO   -Haldol dec given 9/21/20    #Tobacco Use Disorder  -Nicotine Gum PRN    #COPD  -Duoneb PRN for SOB    -Haldol 2mg Q8 PRN for Severe agitation  -Lorazepam 2mg Q12 PRN for Physical aggression  -Hydroxyzine 25mg Q12 PRN for anxiety    -Tylenol PRN for pain  -Pantoprazole for GERD  -Maalox PRN for dyspepsia    #Handoff  -Haldol dec given 9/21  -Duoneb PRN for SOB

## 2020-09-30 NOTE — PROGRESS NOTE BEHAVIORAL HEALTH - NSBHFUPINTERVALHXFT_PSY_A_CORE
Patient seen and evaluated, chart reviewed. As per nursing report no acute events over night.  Medication compliant. TOO in place to ensure medication compliance. On evaluation patient calm and cooperative, in good behavioral control. Denies SOB at this time. No s/s of SOB noted. Denied pain. No s/s of pain noted. Patient continues to have impaired concentration. Thoughts continue to be disorganized and illogical at times. Patient continues to present with paranoid delusions. Continues to present with poor insight and judgement. Denies depression and anxiety. Denies suicidal/homicidal ideations. Denies A/V hallucinations.    Patient later visible on the unit in good behavioral control engaging with peers.     #Handoff  -Haldol dec given 9/21  -Duoneb PRN for SOB Patient seen and evaluated, chart reviewed. As per nursing report no acute events over night.  Medication compliant. TOO in place to ensure medication compliance. On evaluation patient calm and cooperative, in good behavioral control. Denies SOB at this time. No s/s of SOB noted. Denied pain. No s/s of pain noted. Patient continues to have impaired concentration. Thoughts continue to be disorganized and illogical at times. Patient continues to present with paranoid delusions. Continues to present with poor insight and judgement. Denies depression and anxiety. Denies suicidal/homicidal ideations. Denies A/V hallucinations.    Patient later visible on the unit in good behavioral control engaging with peers.     Placed call to patients daughter Hannah Frazier [phone], to update her on patients care, treatment and progress, today's incident. No answer. Unable to leave message. Mailbox is full.    #Handoff  -Haldol dec given 9/21  -Duoneb PRN for SOB Patient seen and evaluated, chart reviewed. As per nursing report no acute events over night.  Medication compliant. TOO in place to ensure medication compliance. On evaluation patient calm and cooperative, in good behavioral control. Denies SOB at this time. No s/s of SOB noted. Denied pain. No s/s of pain noted. Patient continues to have impaired concentration. Thoughts continue to be disorganized and illogical at times. Patient continues to present with paranoid delusions. Continues to present with poor insight and judgement. Denies depression and anxiety. Denies suicidal/homicidal ideations. Denies A/V hallucinations.    Patient later visible on the unit in good behavioral control engaging with peers.     Placed call to patients daughter Hannah Frazier [phone], to update her on patients care, treatment and progress. No answer. Unable to leave message. Mailbox is full.    #Handoff  -Haldol dec given 9/21  -Duoneb PRN for SOB

## 2020-09-30 NOTE — CHART NOTE - NSCHARTNOTEFT_GEN_A_CORE
The treatment team met with pt. to review treatment plan, medications and discharge plan.  Pt. continues to present as disorganized, illogical and with paranoid delusions.  Due to these symptoms, pt. is unable to engage in any meaningful dialogue.  However, no behavioral issues have been reported.  Pt. has been taking her medication as prescribed.  TOO remains in place.  Pt. denies any suicidal or homicidal ideations or any A/V hallucinations.  She remains present on the unit.    Pt. will be referred back to her residence at the [facility].  Writer is waiting on 1 week's worth of progress notes dictating pt. is compliant with medication and does not present with any behavioral issues.  Once this is available, writer will request the NICANOR be completed.  Writer did speak with [facility] who stated they would take pt. back if progress notes dictate pt. is compliant with medication and does not present with behavioral issues.    Mental Status Exam:    Mood-  Euthymic    Sleep-  Normal    Appetite-  Normal    ADL's-  Fair    Thought Process-  Disorganized/Illogical/Impaired Reasoning    Observation-  Routine    Pt. is not yet stable for discharge at this time.

## 2020-09-30 NOTE — PROGRESS NOTE BEHAVIORAL HEALTH - NSBHCHARTREVIEWVS_PSY_A_CORE FT
Vital Signs Last 24 Hrs- Refused  T(C): --  T(F): --  HR: --  BP: --  BP(mean): --  RR: --  SpO2: --

## 2020-10-01 PROCEDURE — 99231 SBSQ HOSP IP/OBS SF/LOW 25: CPT

## 2020-10-01 RX ORDER — ACETAMINOPHEN 500 MG
650 TABLET ORAL EVERY 4 HOURS
Refills: 0 | Status: DISCONTINUED | OUTPATIENT
Start: 2020-10-01 | End: 2020-11-02

## 2020-10-01 RX ORDER — PANTOPRAZOLE SODIUM 20 MG/1
40 TABLET, DELAYED RELEASE ORAL
Refills: 0 | Status: DISCONTINUED | OUTPATIENT
Start: 2020-10-01 | End: 2020-11-02

## 2020-10-01 RX ADMIN — Medication 1 MILLIGRAM(S): at 20:55

## 2020-10-01 RX ADMIN — HALOPERIDOL DECANOATE 5 MILLIGRAM(S): 100 INJECTION INTRAMUSCULAR at 20:02

## 2020-10-01 RX ADMIN — HALOPERIDOL DECANOATE 5 MILLIGRAM(S): 100 INJECTION INTRAMUSCULAR at 08:13

## 2020-10-01 NOTE — PROGRESS NOTE BEHAVIORAL HEALTH - NSBHFUPINTERVALHXFT_PSY_A_CORE
Patient seen and evaluated, chart reviewed. As per nursing report PRN for anxiety with good effect.  Medication compliant. TOO in place to ensure medication compliance. On evaluation patient calm and cooperative, in good behavioral control. States " I miss the grand kids" Then went on to ramble about the world changing. Denies SOB at this time. No s/s of SOB noted. Denied pain. No s/s of pain noted. Patient continues to have impaired concentration. Thoughts continue to be disorganized and illogical at times. Patient continues to present with paranoid delusions. Continues to present with poor insight and judgement. Denies depression and anxiety. Denies suicidal/homicidal ideations. Denies A/V hallucinations. Patient visible on the unit in good behavioral control, engaging with peers and attending groups.    Placed call to patients daughter Hannah Frazier [phone], to update her on patients care, treatment and progress. No answer. Unable to leave message. Mailbox is full.    #Handoff  -Haldol dec given 9/21  -Duoneb PRN for SOB

## 2020-10-01 NOTE — PROGRESS NOTE BEHAVIORAL HEALTH - SUMMARY
63 year old female , domiciled in nursing home, with history of COPD, DVT, bipolar, and dementia, multiple prior inpatient psych, was at [facility] for 2 years, recently discharged from inpatient psych [facility] transferred to [facility], sent to the ER for continuous medication non-compliant for the past months, worsening psychosis and agitation.    Patient presents with decompensated psychiatric symptoms, secondary to medication non-compliance. Disorganized behavior noted and underlying psychosis appears to be impairing patient's judgment and behavior. Patient appears to be a danger to herself at this time due to patient unable to care for herself at this time. Patient would benefit form IPP for safety and stabilization.    Thoughts continue to be disorganized, illogical. Patient continues to present with paranoid delusions.  Continues to present with poor insight and judgement.  Denies depression and anxiety. Denies suicidal/homicidal ideations. Denies A/V hallucinations. Patient in good behavioral control. Visible on the unit engaging with peers.    #Admit 2PC (9:27)    Plan:    #Schizophrenia  -Haldol 5mg Q12  -TOO in place Haldol IM 5mg Q12 if patient refuses PO   -Haldol dec given 9/21/20    #Tobacco Use Disorder  -Nicotine Gum PRN    #COPD  -Duoneb PRN for SOB    -Haldol 2mg Q8 PRN for Severe agitation  -Lorazepam 2mg Q12 PRN for Physical aggression  -Hydroxyzine 25mg Q12 PRN for anxiety    -Tylenol PRN for pain  -Pantoprazole for GERD  -Maalox PRN for dyspepsia    #Handoff  -Haldol dec given 9/21  -Duoneb PRN for SOB

## 2020-10-01 NOTE — PROGRESS NOTE BEHAVIORAL HEALTH - NSBHCHARTREVIEWVS_PSY_A_CORE FT
Vital Signs Last 24 Hrs  T(C): 37.1 (01 Oct 2020 08:53), Max: 37.1 (01 Oct 2020 08:53)  T(F): 98.7 (01 Oct 2020 08:53), Max: 98.7 (01 Oct 2020 08:53)  HR: 96 (01 Oct 2020 08:53) (96 - 96)  BP: 124/97 (01 Oct 2020 08:53) (124/97 - 124/97)  BP(mean): --  RR: 18 (01 Oct 2020 08:53) (18 - 18)  SpO2: --

## 2020-10-02 PROCEDURE — 99231 SBSQ HOSP IP/OBS SF/LOW 25: CPT

## 2020-10-02 RX ADMIN — Medication 1 MILLIGRAM(S): at 23:53

## 2020-10-02 RX ADMIN — HALOPERIDOL DECANOATE 5 MILLIGRAM(S): 100 INJECTION INTRAMUSCULAR at 08:04

## 2020-10-02 RX ADMIN — HALOPERIDOL DECANOATE 5 MILLIGRAM(S): 100 INJECTION INTRAMUSCULAR at 20:06

## 2020-10-02 NOTE — PROGRESS NOTE BEHAVIORAL HEALTH - SUMMARY
63 year old female , domiciled in nursing home, with history of COPD, DVT, bipolar, and dementia, multiple prior inpatient psych, was at [place] for 2 years, recently discharged from inpatient psych [facility] transferred to [facility], sent to the ER for continuous medication non-compliant for the past months, worsening psychosis and agitation.    Patient presents with decompensated psychiatric symptoms, secondary to medication non-compliance. Disorganized behavior noted and underlying psychosis appears to be impairing patient's judgment and behavior. Patient appears to be a danger to herself at this time due to patient unable to care for herself at this time. Patient would benefit form IPP for safety and stabilization.    Thoughts continue to be disorganized, illogical. Patient continues to present with paranoid delusions.  Continues to present with poor insight and judgement.  Denies depression and anxiety. Denies suicidal/homicidal ideations. Denies A/V hallucinations. Patient in good behavioral control. Visible on the unit engaging with peers.    #Admit 2PC (9:27)    Plan:    #Schizophrenia  -Haldol 5mg Q12  -TOO in place Haldol IM 5mg Q12 if patient refuses PO   -Haldol dec given 9/21/20    #Tobacco Use Disorder  -Nicotine Gum PRN    #COPD  -Duoneb PRN for SOB    -Haldol 2mg Q8 PRN for Severe agitation  -Lorazepam 2mg Q12 PRN for Physical aggression  -Hydroxyzine 25mg Q12 PRN for anxiety    -Tylenol PRN for pain  -Pantoprazole for GERD  -Maalox PRN for dyspepsia    #Handoff  -Haldol dec given 9/21  -Duoneb PRN for SOB

## 2020-10-02 NOTE — PROGRESS NOTE BEHAVIORAL HEALTH - NSBHFUPINTERVALHXFT_PSY_A_CORE
Patient seen and evaluated in treatment team, chart reviewed. As per nursing report PRN for anxiety with good effect.  Medication compliant. TOO in place to ensure medication compliance. On evaluation patient pleasant in a good mood. Walked into treatment team talking in Malag[place]. Patient stated in english "I was trained to work for the [employer]" Patient calm and cooperative, in good behavioral control. Patient continues to have impaired concentration. Thoughts continue to be disorganized and illogical at times. Patient continues to present with paranoid delusions. Continues to present with poor insight and judgement. Denies depression and anxiety. Denies suicidal/homicidal ideations. Able to contract for safety stating she would call someone if she had suicidal thoughts. Denies A/V hallucinations. Patient visible on the unit in good behavioral control, engaging with peers and attending groups.    #Handoff  -Haldol dec given 9/21  -Duoneb PRN for SOB

## 2020-10-02 NOTE — PROGRESS NOTE BEHAVIORAL HEALTH - NSBHCHARTREVIEWVS_PSY_A_CORE FT
Vital Signs Last 24 Hrs  T(C): 36.1 (02 Oct 2020 08:21), Max: 36.1 (02 Oct 2020 08:21)  T(F): 96.9 (02 Oct 2020 08:21), Max: 96.9 (02 Oct 2020 08:21)  HR: 95 (02 Oct 2020 08:21) (84 - 95)  BP: 144/69 (02 Oct 2020 08:21) (118/86 - 144/69)  BP(mean): --  RR: 18 (02 Oct 2020 08:21) (18 - 18)  SpO2: --

## 2020-10-03 RX ADMIN — HALOPERIDOL DECANOATE 5 MILLIGRAM(S): 100 INJECTION INTRAMUSCULAR at 20:35

## 2020-10-03 RX ADMIN — HALOPERIDOL DECANOATE 5 MILLIGRAM(S): 100 INJECTION INTRAMUSCULAR at 08:13

## 2020-10-04 RX ADMIN — Medication 1 MILLIGRAM(S): at 20:20

## 2020-10-04 RX ADMIN — PANTOPRAZOLE SODIUM 40 MILLIGRAM(S): 20 TABLET, DELAYED RELEASE ORAL at 09:10

## 2020-10-04 RX ADMIN — HALOPERIDOL DECANOATE 5 MILLIGRAM(S): 100 INJECTION INTRAMUSCULAR at 09:10

## 2020-10-04 RX ADMIN — HALOPERIDOL DECANOATE 5 MILLIGRAM(S): 100 INJECTION INTRAMUSCULAR at 20:02

## 2020-10-05 PROCEDURE — 99231 SBSQ HOSP IP/OBS SF/LOW 25: CPT

## 2020-10-05 RX ADMIN — HALOPERIDOL DECANOATE 5 MILLIGRAM(S): 100 INJECTION INTRAMUSCULAR at 08:15

## 2020-10-05 RX ADMIN — HALOPERIDOL DECANOATE 5 MILLIGRAM(S): 100 INJECTION INTRAMUSCULAR at 20:10

## 2020-10-05 NOTE — PROGRESS NOTE BEHAVIORAL HEALTH - SUMMARY
63 year old female , domiciled in nursing home, with history of COPD, DVT, bipolar, and dementia, multiple prior inpatient psych, was at [place] for 2 years, recently discharged from inpatient psych [facility] transferred to [facility], sent to the ER for continuous medication non-compliant for the past months, worsening psychosis and agitation.    Patient presents with decompensated psychiatric symptoms, secondary to medication non-compliance. Disorganized behavior noted and underlying psychosis appears to be impairing patient's judgment and behavior. Patient appears to be a danger to herself at this time due to patient unable to care for herself at this time. Patient would benefit form IPP for safety and stabilization.    Thoughts continue to be disorganized, illogical. Patient continues to present with paranoid delusions.  Continues to present with poor insight and judgement.  Denies depression and anxiety. Denies suicidal/homicidal ideations. Denies A/V hallucinations. Patient in good behavioral control. Visible on the unit engaging with peers.    #Admit 2PC (9:27)    Plan:    #Schizophrenia  -Haldol 5mg Q12  -TOO in place Haldol IM 5mg Q12 if patient refuses PO   -Haldol dec given 9/21/20    #Tobacco Use Disorder  -Nicotine Gum PRN    #COPD  -Duoneb PRN for SOB    -Haldol 2mg Q8 PRN for Severe agitation  -Lorazepam 2mg Q12 PRN for Physical aggression  -Hydroxyzine 25mg Q12 PRN for anxiety    -Tylenol PRN for pain  -Pantoprazole for GERD  -Maalox PRN for dyspepsia    #Handoff  -Haldol dec given 9/21  -Duoneb PRN for SOB

## 2020-10-05 NOTE — PROGRESS NOTE BEHAVIORAL HEALTH - NSBHCHARTREVIEWVS_PSY_A_CORE FT
Vital Signs Last 24 Hrs Refused  T(C): --  T(F): --  HR: 83 (05 Oct 2020 08:53) (83 - 83)  BP: 125/60 (05 Oct 2020 08:53) (125/60 - 125/60)  BP(mean): --  RR: --  SpO2: --

## 2020-10-05 NOTE — PROGRESS NOTE BEHAVIORAL HEALTH - NSBHFUPINTERVALHXFT_PSY_A_CORE
Patient seen and evaluated in treatment team, chart reviewed. As per nursing report PRN for anxiety with good effect.  Medication compliant. TOO in place to ensure medication compliance. On evaluation patient calm and cooperative, in good behavioral control. Stated she had a good weekend. Patient continues to have impaired concentration. Thoughts continue to be disorganized and illogical at times. Patient continues to present with paranoid delusions. Continues to present with poor insight and judgement. Denies depression and anxiety. Denies suicidal/homicidal ideations. Able to contract for safety stating she would call someone if she had suicidal thoughts. Denies A/V hallucinations. Patient visible on the unit in good behavioral control, engaging with peers and attending groups.    #Handoff  -Haldol dec given 9/21  -Duoneb PRN for SOB Patient seen and evaluated, chart reviewed. As per nursing report PRN for anxiety with good effect.  Medication compliant. TOO in place to ensure medication compliance. On evaluation patient calm and cooperative, in good behavioral control. Stated she had a good weekend. Patient continues to have impaired concentration. Thoughts continue to be disorganized and illogical at times. Patient continues to present with paranoid delusions. Continues to present with poor insight and judgement. Denies depression and anxiety. Denies suicidal/homicidal ideations. Able to contract for safety stating she would call someone if she had suicidal thoughts. Denies A/V hallucinations. Patient visible on the unit in good behavioral control, engaging with peers and attending groups.    #Handoff  -Haldol dec given 9/21  -Duoneb PRN for SOB

## 2020-10-05 NOTE — CHART NOTE - NSCHARTNOTEFT_GEN_A_CORE
The treatment team met with pt. to review treatment plan, medications and discharge plan.  Pt. continues to present with disorganized thoughts and some paranoid delusions.  She has been in good behavioral control and no issues were reported over the weekend.  She presented as calm and cooperative on this date.  Pt. is remaining compliant with her medications.  She remains active and present on the unit.    On this date, writer requested a NICANOR be completed so that pt. can be referred back to her residence at the [facility].  Once the NICANOR is completed, writer will send it to the aforementioned residence with any other requested documents.    Mental Status Exam:    Mood-  Calm    Sleep-  Normal    Appetite-  Normal    ADL's- Fair    Thought Process-  Disorganized    Observation-  Routine    Pt. is not yet ready for discharge at this time.

## 2020-10-06 PROCEDURE — 99231 SBSQ HOSP IP/OBS SF/LOW 25: CPT

## 2020-10-06 RX ADMIN — Medication 1 MILLIGRAM(S): at 00:43

## 2020-10-06 RX ADMIN — HALOPERIDOL DECANOATE 5 MILLIGRAM(S): 100 INJECTION INTRAMUSCULAR at 20:21

## 2020-10-06 RX ADMIN — HALOPERIDOL DECANOATE 5 MILLIGRAM(S): 100 INJECTION INTRAMUSCULAR at 08:12

## 2020-10-06 NOTE — PROGRESS NOTE BEHAVIORAL HEALTH - NSBHCHARTREVIEWVS_PSY_A_CORE FT
Vital Signs Last 24 Hrs  T(C): --  T(F): --  HR: --  BP: 134/71 (05 Oct 2020 16:07) (134/71 - 134/71)  BP(mean): --  RR: 79 (05 Oct 2020 16:07) (79 - 79)  SpO2: --

## 2020-10-06 NOTE — PROGRESS NOTE BEHAVIORAL HEALTH - NSBHFUPINTERVALHXFT_PSY_A_CORE
Patient seen and evaluated, chart reviewed. As per nursing report PRN for anxiety with good effect.  Medication compliant. TOO in place to ensure medication compliance. On evaluation patient calm and cooperative, in good behavioral control. Patient continues to have impaired concentration. Thoughts continue to be disorganized and illogical at times. Patient continues to present with paranoid delusions. Continues to present with poor insight and judgement. Denies depression and anxiety. Denies suicidal/homicidal ideations. Denies A/V hallucinations. Patient visible on the unit in good behavioral control, engaging with peers and attending groups.    #Handoff  -Haldol dec given 9/21  -Duoneb PRN for SOB Patient seen and evaluated, chart reviewed. As per nursing report PRN for anxiety with good effect.  Medication compliant. TOO in place to ensure medication compliance. On evaluation patient calm and cooperative, in good behavioral control. Patient continues to have impaired concentration. Thoughts continue to be disorganized and illogical at times. Patient continues to present with paranoid delusions. Continues to present with poor insight and judgement. Denies depression and anxiety. Denies suicidal/homicidal ideations. Denies A/V hallucinations. Patient visible on the unit in good behavioral control, engaging with peers and attending groups.    Spoke to patients daughter Hannah Frazier [phone] updated her on patients care, treatment and progress. Daughter states she spoke to patient and states patient sounds like she is doing much better.    #Handoff  -Haldol dec given 9/21  -Duoneb PRN for SOB

## 2020-10-06 NOTE — PROGRESS NOTE BEHAVIORAL HEALTH - SUMMARY
63 year old female , domiciled in nursing home, with history of COPD, DVT, bipolar, and dementia, multiple prior inpatient psych, was at [place] for 2 years, recently discharged from inpatient psych [facility] transferred to [facility], sent to the ER for continuous medication non-compliant for the past months, worsening psychosis and agitation.    Patient presents with decompensated psychiatric symptoms, secondary to medication non-compliance. Disorganized behavior noted and underlying psychosis appears to be impairing patient's judgment and behavior. Patient appears to be a danger to herself at this time due to patient unable to care for herself at this time. Patient would benefit form IPP for safety and stabilization.    Thoughts continue to be disorganized, illogical. Patient continues to present with paranoid delusions.  Continues to present with poor insight and judgement.  Denies depression and anxiety. Denies suicidal/homicidal ideations. Denies A/V hallucinations. Patient in good behavioral control. Visible on the unit engaging with peers.    #Admit 2PC (9:27)    Plan:    #Schizophrenia  -Haldol 5mg Q12  -TOO in place Haldol IM 5mg Q12 if patient refuses PO   -Haldol dec given 9/21/20    #Tobacco Use Disorder  -Nicotine Gum PRN    #COPD  -Duoneb PRN for SOB    -Haldol 2mg Q8 PRN for Severe agitation  -Lorazepam 2mg Q12 PRN for Physical aggression  -Hydroxyzine 25mg Q12 PRN for anxiety    -Tylenol PRN for pain  -Pantoprazole for GERD  -Maalox PRN for dyspepsia    #Handoff  -Haldol dec given 9/21  -Duoneb PRN for SOB

## 2020-10-06 NOTE — CHART NOTE - NSCHARTNOTEFT_GEN_A_CORE
On this date a NICANOR was submitted to the [facility] as well as their request for the past 7 days worth of progress notes and medication list.  Writer is in the process of completing the Level 1 screen form so that a required Level 2 screening can be requested.

## 2020-10-07 PROCEDURE — 99232 SBSQ HOSP IP/OBS MODERATE 35: CPT

## 2020-10-07 RX ADMIN — Medication 1 MILLIGRAM(S): at 00:00

## 2020-10-07 RX ADMIN — HALOPERIDOL DECANOATE 5 MILLIGRAM(S): 100 INJECTION INTRAMUSCULAR at 20:39

## 2020-10-07 RX ADMIN — HALOPERIDOL DECANOATE 5 MILLIGRAM(S): 100 INJECTION INTRAMUSCULAR at 08:44

## 2020-10-07 NOTE — PROGRESS NOTE BEHAVIORAL HEALTH - NSBHFUPINTERVALCCFT_PSY_A_CORE
pt is without acute c/o. Mood neutral. Less intrusive and lass irritable. Complying with meds; will continue to monitor. NICANOR in process.  No s/h ideation

## 2020-10-07 NOTE — CHART NOTE - NSCHARTNOTEFT_GEN_A_CORE
The treatment team met with pt. to review treatment plan, medications and discharge plan.  Pt. presents with baseline disorganized thoughts and baseline paranoid delusions. Patients acute exacerbation of psychiatric symptoms seems to be controlled by medication compliance at this time. Patient is compliant with treatment under TOO and is visible on unit.  Patient has been in good behavioral control and no issues were reported by unit staff. During interview patient presented as calm and cooperative.    NICANOR was sent to [facility]. Level 2 pending. discharge planning ensues.     Mental Status Exam:    Mood-  Calm    Sleep-  Normal    Appetite-  Normal    ADL's- Fair    Thought Process-  Disorganized    Observation-  Routine    Pt. is not yet ready for discharge at this time.

## 2020-10-08 DIAGNOSIS — F20.0 PARANOID SCHIZOPHRENIA: ICD-10-CM

## 2020-10-08 PROCEDURE — 99232 SBSQ HOSP IP/OBS MODERATE 35: CPT

## 2020-10-08 RX ORDER — HALOPERIDOL DECANOATE 100 MG/ML
5 INJECTION INTRAMUSCULAR EVERY 24 HOURS
Refills: 0 | Status: DISCONTINUED | OUTPATIENT
Start: 2020-10-08 | End: 2020-10-08

## 2020-10-08 RX ORDER — HALOPERIDOL DECANOATE 100 MG/ML
5 INJECTION INTRAMUSCULAR EVERY 12 HOURS
Refills: 0 | Status: DISCONTINUED | OUTPATIENT
Start: 2020-10-08 | End: 2020-10-20

## 2020-10-08 RX ADMIN — HALOPERIDOL DECANOATE 5 MILLIGRAM(S): 100 INJECTION INTRAMUSCULAR at 11:24

## 2020-10-08 RX ADMIN — HALOPERIDOL DECANOATE 5 MILLIGRAM(S): 100 INJECTION INTRAMUSCULAR at 20:46

## 2020-10-08 NOTE — PROGRESS NOTE BEHAVIORAL HEALTH - NSBHFUPINTERVALCCFT_PSY_A_CORE
Pt is without acute change in status.  no s/h ideation or behavior issues.  ADLs good; pt feels stable for d/c.  Sw proceeding with plan to have pt return to residence

## 2020-10-08 NOTE — CHART NOTE - NSCHARTNOTEFT_GEN_A_CORE
On this date, the request for a Level II screening was faxed to [facility].  Writer will follow up.

## 2020-10-09 PROCEDURE — 99232 SBSQ HOSP IP/OBS MODERATE 35: CPT

## 2020-10-09 RX ADMIN — HALOPERIDOL DECANOATE 5 MILLIGRAM(S): 100 INJECTION INTRAMUSCULAR at 08:23

## 2020-10-09 RX ADMIN — Medication 1 MILLIGRAM(S): at 21:43

## 2020-10-09 RX ADMIN — HALOPERIDOL DECANOATE 5 MILLIGRAM(S): 100 INJECTION INTRAMUSCULAR at 20:12

## 2020-10-09 NOTE — CHART NOTE - NSCHARTNOTEFT_GEN_A_CORE
The treatment team met with pt. to review treatment plan, medication and discharge plan.  There is no change in pt.'s mental status at this time.  Pt. has been in good behavioral control. She is taking her medications as prescribed and is present on the unit.  She does present as preoccupied with discharge which the treatment assures pt. is being addressed.    A NICANOR was sent this week to [facility].  They requested the past 7 days of progress notes which were sent as well and will want COVID test results and medications.  Writer completed the paperwork for [facility] in order to request the Level II screening. Documentation was faxed on 10/8 and writer is waiting to hear back with an appointment date to have pt. screened.  Writer will continue to follow up.

## 2020-10-09 NOTE — PROGRESS NOTE BEHAVIORAL HEALTH - NSBHFUPINTERVALCCFT_PSY_A_CORE
Pt is pleasant on approach; no behavior issues, and patient is complying with PO meds.  Pt is significantly less psychotic than on admission. No assaultive behavior.    NICANOR done; IPRO level 2 done in preparation for d/c

## 2020-10-10 RX ADMIN — HALOPERIDOL DECANOATE 5 MILLIGRAM(S): 100 INJECTION INTRAMUSCULAR at 20:11

## 2020-10-10 RX ADMIN — Medication 1 MILLIGRAM(S): at 20:09

## 2020-10-10 RX ADMIN — HALOPERIDOL DECANOATE 5 MILLIGRAM(S): 100 INJECTION INTRAMUSCULAR at 08:23
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
## 2020-10-11 RX ADMIN — HALOPERIDOL DECANOATE 5 MILLIGRAM(S): 100 INJECTION INTRAMUSCULAR at 20:12

## 2020-10-11 RX ADMIN — HALOPERIDOL DECANOATE 5 MILLIGRAM(S): 100 INJECTION INTRAMUSCULAR at 09:25

## 2020-10-12 PROCEDURE — 99231 SBSQ HOSP IP/OBS SF/LOW 25: CPT

## 2020-10-12 RX ADMIN — Medication 1 MILLIGRAM(S): at 00:12

## 2020-10-12 RX ADMIN — Medication 1 MILLIGRAM(S): at 22:18

## 2020-10-12 RX ADMIN — HALOPERIDOL DECANOATE 5 MILLIGRAM(S): 100 INJECTION INTRAMUSCULAR at 08:30

## 2020-10-12 RX ADMIN — HALOPERIDOL DECANOATE 5 MILLIGRAM(S): 100 INJECTION INTRAMUSCULAR at 21:40

## 2020-10-12 NOTE — CHART NOTE - NSCHARTNOTEFT_GEN_A_CORE
Social Work Note:      The treatment team met with patient to review treatment plan, medication and discharge plan.  On evaluation, patient is calm and cooperative.  Her thoughts continue to be disorganized and illogical at times.  She denies SI/HI and auditory/visual hallucinations.  Patient has been compliant with medications and unit routine.  On the unit, patient has been in good behavioral control.  She is visible in day room interacting with peers and staff.  She has been attending groups regularly.     A NICANOR was sent last week to [facility].  They requested the past 7 days of progress notes which were sent as well.  Writer completed the paperwork for [facility] in order to request the Level II screening.  Documentation was faxed on 10/8 and writer is waiting to hear back with an appointment date to have patient screened.  Writer will continue to follow up.    Mental Status Exam:    Mood-  Euthymic    Sleep- Normal    Appetite- Normal    ADL's- Fair    Thought Process- Disorganized    Observation- Routine    Pt. is not yet stable for discharge at this time.

## 2020-10-12 NOTE — PROGRESS NOTE BEHAVIORAL HEALTH - SUMMARY
63 year old female , domiciled in nursing home, with history of COPD, DVT, bipolar, and dementia, multiple prior inpatient psych, was at [place] for 2 years, recently discharged from inpatient psych [facility] transferred to [facility], sent to the ER for continuous medication non-compliant for the past months, worsening psychosis and agitation.    Patient presents with decompensated psychiatric symptoms, secondary to medication non-compliance. Disorganized behavior noted and underlying psychosis appears to be impairing patient's judgment and behavior. Patient appears to be a danger to herself at this time due to patient unable to care for herself at this time. Patient would benefit form IPP for safety and stabilization.    Thoughts continue to be disorganized, illogical. Patient continues to present with paranoid delusions, less psychotic. Continues to present with poor insight and judgement.  Denies depression and anxiety. Denies suicidal/homicidal ideations. Denies A/V hallucinations. Patient in good behavioral control. Visible on the unit engaging with peers.    #Admit 2PC (9:27)    Plan:    #Schizophrenia  -Haldol 5mg Q12  -TOO in place Haldol IM 5mg Q12 if patient refuses PO   -Haldol dec given 9/21/20    #Tobacco Use Disorder  -Nicotine Gum PRN    #COPD  -Duoneb PRN for SOB    -Haldol 2mg Q8 PRN for Severe agitation  -Lorazepam 2mg Q12 PRN for Physical aggression  -Hydroxyzine 25mg Q12 PRN for anxiety    -Tylenol PRN for pain  -Pantoprazole for GERD  -Maalox PRN for dyspepsia    #Handoff  -Haldol dec given 9/21  -Duoneb PRN for SOB 63 year old female , domiciled in nursing home, with history of COPD, DVT, bipolar, and dementia, multiple prior inpatient psych, was at [place] for 2 years, recently discharged from inpatient psych [facility] transferred to [facility], sent to the ER for continuous medication non-compliant for the past months, worsening psychosis and agitation.    Patient presents with decompensated psychiatric symptoms, secondary to medication non-compliance. Disorganized behavior noted and underlying psychosis appears to be impairing patient's judgment and behavior. Patient appears to be a danger to herself at this time due to patient unable to care for herself at this time. Patient would benefit form IPP for safety and stabilization.    Thoughts continue to be disorganized, illogical. Patient continues to present with paranoid delusions, less psychotic. Continues to present with poor insight and judgement.  Denies depression and anxiety. Denies suicidal/homicidal ideations. Denies A/V hallucinations. Patient in good behavioral control. Visible on the unit engaging with peers.    #Admit 2PC (9:27)    Plan:    #Schizophrenia  -Haldol 5mg Q12  -TOO in place Haldol IM 5mg Q12 if patient refuses PO   -Haldol dec given 9/21/20    #Tobacco Use Disorder  -Nicotine Gum PRN    #COPD  -Duoneb PRN for SOB    -Haldol 2mg Q8 PRN for Severe agitation  -Lorazepam 1mg Q12 PRN for Physical aggression  -Hydroxyzine 25mg Q12 PRN for anxiety    -Tylenol PRN for pain  -Pantoprazole for GERD  -Maalox PRN for dyspepsia    #Handoff  -Haldol dec given 9/21  -Duoneb PRN for SOB
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
## 2020-10-13 PROCEDURE — 99231 SBSQ HOSP IP/OBS SF/LOW 25: CPT

## 2020-10-13 RX ADMIN — HALOPERIDOL DECANOATE 5 MILLIGRAM(S): 100 INJECTION INTRAMUSCULAR at 08:17

## 2020-10-13 RX ADMIN — HALOPERIDOL DECANOATE 5 MILLIGRAM(S): 100 INJECTION INTRAMUSCULAR at 20:27

## 2020-10-13 RX ADMIN — Medication 1 MILLIGRAM(S): at 22:48

## 2020-10-13 NOTE — PROGRESS NOTE BEHAVIORAL HEALTH - SUMMARY
63 year old female , domiciled in nursing home, with history of COPD, DVT, bipolar, and dementia, multiple prior inpatient psych, was at [place] for 2 years, recently discharged from inpatient psych [facility] transferred to [facility], sent to the ER for continuous medication non-compliant for the past months, worsening psychosis and agitation.    Patient presents with decompensated psychiatric symptoms, secondary to medication non-compliance. Disorganized behavior noted and underlying psychosis appears to be impairing patient's judgment and behavior. Patient appears to be a danger to herself at this time due to patient unable to care for herself at this time. Patient would benefit form IPP for safety and stabilization.    Thoughts continue to be disorganized, illogical. Patient continues to present with paranoid delusions, less psychotic. Continues to present with poor insight and judgement.  Denies depression and anxiety. Denies suicidal/homicidal ideations. Denies A/V hallucinations. Patient in good behavioral control. Visible on the unit engaging with peers.    #Admit 2PC (9:27)    Plan:    #Schizophrenia  -Haldol 5mg Q12  -TOO in place Haldol IM 5mg Q12 if patient refuses PO   -Haldol dec given 9/21/20    #Tobacco Use Disorder  -Nicotine Gum PRN    #COPD  -Duoneb PRN for SOB    -Haldol 2mg Q8 PRN for Severe agitation  -Lorazepam 2mg Q12 PRN for Physical aggression  -Hydroxyzine 25mg Q12 PRN for anxiety    -Tylenol PRN for pain  -Pantoprazole for GERD  -Maalox PRN for dyspepsia    #Handoff  -Haldol dec given 9/21  -Duoneb PRN for SOB 63 year old female , domiciled in nursing home, with history of COPD, DVT, bipolar, and dementia, multiple prior inpatient psych, was at [place] for 2 years, recently discharged from inpatient psych [facility] transferred to [facility], sent to the ER for continuous medication non-compliant for the past months, worsening psychosis and agitation.    Patient presents with decompensated psychiatric symptoms, secondary to medication non-compliance. Disorganized behavior noted and underlying psychosis appears to be impairing patient's judgment and behavior. Patient appears to be a danger to herself at this time due to patient unable to care for herself at this time. Patient would benefit form IPP for safety and stabilization.    Thoughts continue to be disorganized, illogical. Patient continues to present with paranoid delusions, less psychotic. Continues to present with poor insight and judgement.  Denies depression and anxiety. Denies suicidal/homicidal ideations. Denies A/V hallucinations. Patient in good behavioral control. Visible on the unit engaging with peers.    #Admit 2PC (9:27)    Plan:    #Schizophrenia  -Haldol 5mg Q12  -TOO in place Haldol IM 5mg Q12 if patient refuses PO   -Haldol dec given 9/21/20    #Tobacco Use Disorder  -Nicotine Gum PRN    #COPD  -Duoneb PRN for SOB    -Haldol 2mg Q8 PRN for Severe agitation  -Lorazepam 1mg Q12 PRN for Physical aggression  -Hydroxyzine 25mg Q12 PRN for anxiety    -Tylenol PRN for pain  -Pantoprazole for GERD  -Maalox PRN for dyspepsia    #Handoff  -Haldol dec given 9/21  -Duoneb PRN for SOB

## 2020-10-13 NOTE — CHART NOTE - NSCHARTNOTEFT_GEN_A_CORE
Additional information was requested by [facility] and faxed on this date.  Writer spoke with pt.;s daughter, Carol Ann Frazier, who faxed guardianship papers which were requested by [facility].  Writer will follow up.

## 2020-10-13 NOTE — PROGRESS NOTE BEHAVIORAL HEALTH - NSBHFUPINTERVALHXFT_PSY_A_CORE
Patient seen and evaluated, chart reviewed. As per nursing report no acute events over night. TOO in place to ensure medication compliance. On evaluation patient calm and cooperative, in good behavioral control. Patient continues to have impaired concentration. Thoughts continue to be disorganized and illogical at times. Patient continues to present with paranoid delusions, less psychotic. Continues to present with poor insight and judgement. Denies depression and anxiety. Denies suicidal/homicidal ideations. Denies A/V hallucinations. Patient visible on the unit in good behavioral control, engaging with peers and attending groups.    NICANOR done; IPRO level 2 done in preparation for d/c    #Handoff  -Haldol dec given 9/21  -Duoneb PRN for SOB

## 2020-10-13 NOTE — PROGRESS NOTE BEHAVIORAL HEALTH - NSBHCHARTREVIEWVS_PSY_A_CORE FT
Vital Signs Last 24 Hrs  T(C): 36.2 (13 Oct 2020 08:14), Max: 36.2 (13 Oct 2020 08:14)  T(F): 97.1 (13 Oct 2020 08:14), Max: 97.1 (13 Oct 2020 08:14)  HR: 94 (13 Oct 2020 08:14) (94 - 94)  BP: 110/56 (13 Oct 2020 08:14) (110/56 - 110/56)  BP(mean): --  RR: 16 (13 Oct 2020 08:14) (16 - 16)  SpO2: --

## 2020-10-14 PROCEDURE — 99231 SBSQ HOSP IP/OBS SF/LOW 25: CPT

## 2020-10-14 RX ORDER — HALOPERIDOL DECANOATE 100 MG/ML
2 INJECTION INTRAMUSCULAR ONCE
Refills: 0 | Status: COMPLETED | OUTPATIENT
Start: 2020-10-14 | End: 2020-10-14

## 2020-10-14 RX ADMIN — HALOPERIDOL DECANOATE 5 MILLIGRAM(S): 100 INJECTION INTRAMUSCULAR at 08:32

## 2020-10-14 RX ADMIN — HALOPERIDOL DECANOATE 2 MILLIGRAM(S): 100 INJECTION INTRAMUSCULAR at 15:26

## 2020-10-14 RX ADMIN — HALOPERIDOL DECANOATE 5 MILLIGRAM(S): 100 INJECTION INTRAMUSCULAR at 21:38

## 2020-10-14 RX ADMIN — Medication 1 MILLIGRAM(S): at 22:48

## 2020-10-14 RX ADMIN — Medication 1 MILLIGRAM(S): at 15:26

## 2020-10-14 NOTE — PROGRESS NOTE BEHAVIORAL HEALTH - NSBHCHARTREVIEWVS_PSY_A_CORE FT
Vital Signs Last 24 Hrs  T(C): 36.4 (14 Oct 2020 08:37), Max: 36.4 (14 Oct 2020 08:37)  T(F): 97.5 (14 Oct 2020 08:37), Max: 97.5 (14 Oct 2020 08:37)  HR: 90 (14 Oct 2020 08:37) (90 - 90)  BP: 129/84 (14 Oct 2020 08:37) (129/84 - 129/84)  BP(mean): --  RR: 16 (14 Oct 2020 08:37) (16 - 16)  SpO2: --

## 2020-10-14 NOTE — PROGRESS NOTE BEHAVIORAL HEALTH - SUMMARY
63 year old female , domiciled in nursing home, with history of COPD, DVT, bipolar, and dementia, multiple prior inpatient psych, was at [place] for 2 years, recently discharged from inpatient psych [facility] transferred to [facility], sent to the ER for continuous medication non-compliant for the past months, worsening psychosis and agitation.    Patient presents with decompensated psychiatric symptoms, secondary to medication non-compliance. Disorganized behavior noted and underlying psychosis appears to be impairing patient's judgment and behavior. Patient appears to be a danger to herself at this time due to patient unable to care for herself at this time. Patient would benefit form IPP for safety and stabilization.    Thoughts continue to be disorganized, illogical. Patient continues to present with paranoid delusions, less psychotic. Continues to present with poor insight and judgement.  Denies depression and anxiety. Denies suicidal/homicidal ideations. Denies A/V hallucinations. Patient in good behavioral control. Visible on the unit engaging with peers.    #Admit 2PC (9:27)    Plan:    #Schizophrenia  -Haldol 5mg Q12  -TOO in place Haldol IM 5mg Q12 if patient refuses PO   -Haldol dec given 9/21/20    #Tobacco Use Disorder  -Nicotine Gum PRN    #COPD  -Duoneb PRN for SOB    -Haldol 2mg Q8 PRN for Severe agitation  -Lorazepam 2mg Q12 PRN for Physical aggression  -Hydroxyzine 25mg Q12 PRN for anxiety    -Tylenol PRN for pain  -Pantoprazole for GERD  -Maalox PRN for dyspepsia    #Handoff  -Haldol dec given 9/21  -Duoneb PRN for SOB 63 year old female , domiciled in nursing home, with history of COPD, DVT, bipolar, and dementia, multiple prior inpatient psych, was at [place] for 2 years, recently discharged from inpatient psych [facility] transferred to [facility], sent to the ER for continuous medication non-compliant for the past months, worsening psychosis and agitation.    Patient presents with decompensated psychiatric symptoms, secondary to medication non-compliance. Disorganized behavior noted and underlying psychosis appears to be impairing patient's judgment and behavior. Patient appears to be a danger to herself at this time due to patient unable to care for herself at this time. Patient would benefit form IPP for safety and stabilization.    Thoughts continue to be disorganized, illogical. Patient continues to present with paranoid delusions, less psychotic. Continues to present with poor insight and judgement.  Denies depression and anxiety. Denies suicidal/homicidal ideations. Denies A/V hallucinations. Patient in good behavioral control. Visible on the unit engaging with peers.    #Admit 2PC (9:27)    Plan:    #Schizophrenia  -Haldol 5mg Q12  -TOO in place Haldol IM 5mg Q12 if patient refuses PO   -Haldol dec given 9/21/20    #Tobacco Use Disorder  -Nicotine Gum PRN    #COPD  -Duoneb PRN for SOB    -Haldol 2mg Q8 PRN for Severe agitation  -Lorazepam 1mg Q12 PRN for Physical aggression  -Hydroxyzine 25mg Q12 PRN for anxiety    -Tylenol PRN for pain  -Pantoprazole for GERD  -Maalox PRN for dyspepsia    #Handoff  -Haldol dec given 9/21  -Duoneb PRN for SOB

## 2020-10-14 NOTE — PROGRESS NOTE BEHAVIORAL HEALTH - NSBHFUPINTERVALHXFT_PSY_A_CORE
Patient seen and evaluated, chart reviewed. As per nursing report patient did not want to drink the liquid haldol and stated "I'll take the needle".  Haldol 5mg IM given. TOO in place to ensure medication compliance. On evaluation patient calm and cooperative, in good behavioral control. Patient continues to have impaired concentration. Thoughts continue to be disorganized and illogical at times. Patient continues to present with paranoid delusions, less psychotic. Continues to present with poor insight and judgement. Denies depression and anxiety. Denies suicidal/homicidal ideations. Denies A/V hallucinations. Patient visible on the unit in good behavioral control, engaging with peers and attending groups.    NICANOR done; IPRO level 2 done in preparation for d/c    #Handoff  -Haldol dec given 9/21  -Duoneb PRN for SOB Patient seen and evaluated, chart reviewed. As per nursing report patient did not want to drink the liquid haldol and stated "I'll take the needle".  Haldol 5mg IM given. TOO in place to ensure medication compliance. On evaluation patient calm and cooperative, in good behavioral control. Patient continues to have impaired concentration. Thoughts continue to be disorganized and illogical at times. Patient continues to present with paranoid delusions, less psychotic. Continues to present with poor insight and judgement. Denies depression and anxiety. Denies suicidal/homicidal ideations. Denies A/V hallucinations. Patient visible on the unit in good behavioral control, engaging with peers and attending groups.    NICANOR done; IPRO level 2 done in preparation for d/c    Later in the day Patient observed to be agitated and aggressive toward staff. Not verbally re-directable. Refused PO PRNs. IM Haldol 2mg and Ativan 1mg ordered and given. Will continue monitor.    Patient observed later to be calm and cooperative, in good behavioral control, apologized for her behavior earlier.      #Handoff  -Haldol dec given 9/21  -Duoneb PRN for SOB

## 2020-10-14 NOTE — CHART NOTE - NSCHARTNOTEFT_GEN_A_CORE
The treatment team met with pt. to review treatment plan, medications and discharge plan.  There is no change in pt.'s mental status at this time.  She does not engage in any meaningful conversation other than to ask about her discharge.  There have been no recent behavioral issues reported.  Pt. refuses to take liquid Haldol but did accept IM injection of Haldol.  TOO remains in place.  Pt. denies any suicidal or homicidal ideations or any A/V hallucinations.  She remains active and present on the unit.    Pt. is being referred back to her residence at [facility].   submitted additional requested information to [facility] on 10/13.   received a message today that the information was received and writer will be getting a call to schedule a screening via phone with pt.  Domenicor will follow up and will send the Level 2 screening to [facility] once it is complete.    Mental Status Exam:    Mood-  Euthymic    Sleep-  Normal    Appetite-  Normal    ADL's-  Fair    Thought Process-  Disorganized/some fixed delusions    Observation-  Routine    Pt. will be ready for discharge once she is cleared to return to her residence.

## 2020-10-14 NOTE — CHART NOTE - NSCHARTNOTEFT_GEN_A_CORE
Patient observed to be agitated and aggressive toward staff. Not verbally re-directable. Refused PO PRNs. IM Haldol 2mg and Ativan 1mg ordered and given. Will continue monitor. Patient observed to be agitated and aggressive toward staff. Not verbally re-directable. Refused PO PRNs. IM Haldol 2mg and Ativan 1mg ordered. Will continue monitor.

## 2020-10-15 PROCEDURE — 99231 SBSQ HOSP IP/OBS SF/LOW 25: CPT

## 2020-10-15 RX ADMIN — HALOPERIDOL DECANOATE 5 MILLIGRAM(S): 100 INJECTION INTRAMUSCULAR at 21:14

## 2020-10-15 RX ADMIN — Medication 1 MILLIGRAM(S): at 18:00

## 2020-10-15 RX ADMIN — HALOPERIDOL DECANOATE 5 MILLIGRAM(S): 100 INJECTION INTRAMUSCULAR at 11:38

## 2020-10-15 NOTE — PROGRESS NOTE BEHAVIORAL HEALTH - NSBHFUPINTERVALHXFT_PSY_A_CORE
Patient seen and evaluated, chart reviewed. As per nursing report Patient observed to be agitated and aggressive toward staff. Not verbally re-directable. Refused PO PRNs. IM Haldol 2mg and Ativan 1mg ordered and given with good effect in afternoon. Later patient calm and cooperative in good behavioral control. TOO in place to ensure medication compliance. On evaluation patient calm and cooperative, in good behavioral control. Patient continues to have impaired concentration. Thoughts continue to be disorganized and illogical at times. Patient continues to present with paranoid delusions, less psychotic. Continues to present with poor insight and judgement. Denies depression and anxiety. Denies suicidal/homicidal ideations. Denies A/V hallucinations. Patient visible on the unit in good behavioral control, engaging with peers and attending groups.    NICANOR done; IPRO level 2 done in preparation for d/c    #Handoff  -Haldol dec given 9/21  -Duoneb PRN for SOB Patient seen and evaluated, chart reviewed. As per nursing report no acute events over night. Patient compliant with psychiatric meds. TOO in place to ensure medication compliance. On evaluation patient calm and cooperative, in good behavioral control. Patient continues to have impaired concentration. Thoughts continue to be disorganized and illogical at times. Patient continues to present with paranoid delusions, less psychotic. Continues to present with poor insight and judgement. Denies depression and anxiety. Denies suicidal/homicidal ideations. Denies A/V hallucinations. Patient visible on the unit in good behavioral control, engaging with peers and attending groups.    NICANOR done; IPRO level 2 done in preparation for d/c    #Handoff  -Haldol dec given 9/21  -Duoneb PRN for SOB

## 2020-10-15 NOTE — PROGRESS NOTE BEHAVIORAL HEALTH - SUMMARY
63 year old female , domiciled in nursing home, with history of COPD, DVT, bipolar, and dementia, multiple prior inpatient psych, was at [place] for 2 years, recently discharged from inpatient psych [facility] transferred to [facility], sent to the ER for continuous medication non-compliant for the past months, worsening psychosis and agitation.    Patient presents with decompensated psychiatric symptoms, secondary to medication non-compliance. Disorganized behavior noted and underlying psychosis appears to be impairing patient's judgment and behavior. Patient appears to be a danger to herself at this time due to patient unable to care for herself at this time. Patient would benefit form IPP for safety and stabilization.    Thoughts continue to be disorganized, illogical. Patient continues to present with paranoid delusions, less psychotic. Continues to present with poor insight and judgement.  Denies depression and anxiety. Denies suicidal/homicidal ideations. Denies A/V hallucinations. Patient in good behavioral control. Visible on the unit engaging with peers.    #Admit 2PC (9:27)    Plan:    #Schizophrenia  -Haldol 5mg Q12  -TOO in place Haldol IM 5mg Q12 if patient refuses PO   -Haldol dec given 9/21/20    #Tobacco Use Disorder  -Nicotine Gum PRN    #COPD  -Duoneb PRN for SOB    -Haldol 2mg Q8 PRN for Severe agitation  -Lorazepam 2mg Q12 PRN for Physical aggression  -Hydroxyzine 25mg Q12 PRN for anxiety    -Tylenol PRN for pain  -Pantoprazole for GERD  -Maalox PRN for dyspepsia    #Handoff  -Haldol dec given 9/21  -Duoneb PRN for SOB 63 year old female , domiciled in nursing home, with history of COPD, DVT, bipolar, and dementia, multiple prior inpatient psych, was at [place] for 2 years, recently discharged from inpatient psych [facility] transferred to [facility], sent to the ER for continuous medication non-compliant for the past months, worsening psychosis and agitation.    Patient presents with decompensated psychiatric symptoms, secondary to medication non-compliance. Disorganized behavior noted and underlying psychosis appears to be impairing patient's judgment and behavior. Patient appears to be a danger to herself at this time due to patient unable to care for herself at this time. Patient would benefit form IPP for safety and stabilization.    Thoughts continue to be disorganized, illogical. Patient continues to present with paranoid delusions, less psychotic. Continues to present with poor insight and judgement.  Denies depression and anxiety. Denies suicidal/homicidal ideations. Denies A/V hallucinations. Patient in good behavioral control. Visible on the unit engaging with peers.    #Admit 2PC (9:27)    Plan:    #Schizophrenia  -Haldol 5mg Q12  -TOO in place Haldol IM 5mg Q12 if patient refuses PO   -Haldol dec given 9/21/20    #Tobacco Use Disorder  -Nicotine Gum PRN    #COPD  -Duoneb PRN for SOB    -Haldol 2mg Q8 PRN for Severe agitation  -Lorazepam 1mg Q12 PRN for Physical aggression  -Hydroxyzine 25mg Q12 PRN for anxiety    -Tylenol PRN for pain  -Pantoprazole for GERD  -Maalox PRN for dyspepsia    #Handoff  -Haldol dec given 9/21  -Duoneb PRN for SOB

## 2020-10-15 NOTE — CHART NOTE - NSCHARTNOTEFT_GEN_A_CORE
Writer spoke with Adrian Wagner from [phone].  Writer provided requested information for the level 2 screening.  He stated he did not need to speak with pt. Mr. Wagner stated he would write up the report and follow up with writer once it is complete.  Writer will follow up.

## 2020-10-16 PROCEDURE — 99231 SBSQ HOSP IP/OBS SF/LOW 25: CPT

## 2020-10-16 RX ADMIN — Medication 1 MILLIGRAM(S): at 21:51

## 2020-10-16 RX ADMIN — HALOPERIDOL DECANOATE 5 MILLIGRAM(S): 100 INJECTION INTRAMUSCULAR at 21:19

## 2020-10-16 RX ADMIN — HALOPERIDOL DECANOATE 5 MILLIGRAM(S): 100 INJECTION INTRAMUSCULAR at 08:10

## 2020-10-16 NOTE — CHART NOTE - NSCHARTNOTEFT_GEN_A_CORE
The treatment team met with pt, to review treatment plan, medications and discharge plans.  There is no change is pt.'s mental status at this time.  She continues to present as delusional and paranoid with disorganized thinking which appears to be pt.'s baseline.  Pt. did participate in today's treatment team meeting.  There have been no recent behavioral issues.  Pt. is remaining compliant with her medication and TOO is in place.  She continues to verbalize preoccupation about her discharge.    The level 2 screening is completed and writer is awaiting the results.  Once that is available, writer will forward it to [facility].  If [facility] refuses to accept pt. back to the facility, a referral for state placement will be made.    Mental Status Exam:    Mood-  Euthymic    Sleep-  Normal    Appetite-  Normal    ADL's-  Fair    Thought Process-  Disorganized/Delusional    Observation-  Routine    Pt. is not yet ready for discharge at this time.

## 2020-10-16 NOTE — PROGRESS NOTE BEHAVIORAL HEALTH - NSBHFUPINTERVALHXFT_PSY_A_CORE
Patient seen and evaluated in treatment team, chart reviewed. As per nursing report no acute events over night. Patient compliant with psychiatric meds. TOO in place to ensure medication compliance. On evaluation patient calm and cooperative, in good behavioral control. Patient continues to have impaired concentration. Thoughts continue to be disorganized and illogical at times. Patient continues to present with paranoid delusions, less psychotic. Continues to present with poor insight and judgement. Denies depression and anxiety. Denies suicidal/homicidal ideations. Denies A/V hallucinations. Patient visible on the unit in good behavioral control, engaging with peers and attending groups.    NICANOR done; IPRO level 2 done in preparation for d/c    #Handoff  -Haldol dec given 9/21  -Duoneb PRN for SOB

## 2020-10-16 NOTE — PROGRESS NOTE BEHAVIORAL HEALTH - NSBHCHARTREVIEWVS_PSY_A_CORE FT
Vital Signs Last 24 Hrs-Patient refused  T(C): --  T(F): --  HR: --  BP: --  BP(mean): --  RR: --  SpO2: --

## 2020-10-16 NOTE — PROGRESS NOTE BEHAVIORAL HEALTH - SUMMARY
63 year old female , domiciled in nursing home, with history of COPD, DVT, bipolar, and dementia, multiple prior inpatient psych, was at [place] for 2 years, recently discharged from inpatient psych [facility] transferred to [facility], sent to the ER for continuous medication non-compliant for the past months, worsening psychosis and agitation.    Patient presents with decompensated psychiatric symptoms, secondary to medication non-compliance. Disorganized behavior noted and underlying psychosis appears to be impairing patient's judgment and behavior. Patient appears to be a danger to herself at this time due to patient unable to care for herself at this time. Patient would benefit form IPP for safety and stabilization.    Thoughts continue to be disorganized, illogical. Patient continues to present with paranoid delusions, less psychotic. Continues to present with poor insight and judgement.  Denies depression and anxiety. Denies suicidal/homicidal ideations. Denies A/V hallucinations. Patient in good behavioral control. Visible on the unit engaging with peers and attending groups.    #Admit 2PC (9:27)    Plan:    #Schizophrenia  -Haldol 5mg Q12  -TOO in place Haldol IM 5mg Q12 if patient refuses PO   -Haldol dec given 9/21/20    #Tobacco Use Disorder  -Nicotine Gum PRN    #COPD  -Duoneb PRN for SOB    -Haldol 2mg Q8 PRN for Severe agitation  -Lorazepam 2mg Q12 PRN for Physical aggression  -Hydroxyzine 25mg Q12 PRN for anxiety    -Tylenol PRN for pain  -Pantoprazole for GERD  -Maalox PRN for dyspepsia    #Handoff  -Haldol dec given 9/21  -Duoneb PRN for SOB 63 year old female , domiciled in nursing home, with history of COPD, DVT, bipolar, and dementia, multiple prior inpatient psych, was at [place] for 2 years, recently discharged from inpatient psych [facility] transferred to [facility], sent to the ER for continuous medication non-compliant for the past months, worsening psychosis and agitation.    Patient presents with decompensated psychiatric symptoms, secondary to medication non-compliance. Disorganized behavior noted and underlying psychosis appears to be impairing patient's judgment and behavior. Patient appears to be a danger to herself at this time due to patient unable to care for herself at this time. Patient would benefit form IPP for safety and stabilization.    Thoughts continue to be disorganized, illogical. Patient continues to present with paranoid delusions, less psychotic. Continues to present with poor insight and judgement.  Denies depression and anxiety. Denies suicidal/homicidal ideations. Denies A/V hallucinations. Patient in good behavioral control. Visible on the unit engaging with peers and attending groups.    #Admit 2PC (9:27)    Plan:    #Schizophrenia  -Haldol 5mg Q12  -TOO in place Haldol IM 5mg Q12 if patient refuses PO   -Haldol dec given 9/21/20    #Tobacco Use Disorder  -Nicotine Gum PRN    #COPD  -Duoneb PRN for SOB    -Haldol 2mg Q8 PRN for Severe agitation  -Lorazepam 1mg Q12 PRN for Physical aggression  -Hydroxyzine 25mg Q12 PRN for anxiety    -Tylenol PRN for pain  -Pantoprazole for GERD  -Maalox PRN for dyspepsia    #Handoff  -Haldol dec given 9/21  -Duoneb PRN for SOB

## 2020-10-17 RX ADMIN — HALOPERIDOL DECANOATE 5 MILLIGRAM(S): 100 INJECTION INTRAMUSCULAR at 12:02

## 2020-10-18 RX ADMIN — HALOPERIDOL DECANOATE 5 MILLIGRAM(S): 100 INJECTION INTRAMUSCULAR at 10:21

## 2020-10-18 RX ADMIN — HALOPERIDOL DECANOATE 5 MILLIGRAM(S): 100 INJECTION INTRAMUSCULAR at 22:00

## 2020-10-18 RX ADMIN — HALOPERIDOL DECANOATE 5 MILLIGRAM(S): 100 INJECTION INTRAMUSCULAR at 00:03

## 2020-10-19 PROCEDURE — 99231 SBSQ HOSP IP/OBS SF/LOW 25: CPT

## 2020-10-19 RX ORDER — HALOPERIDOL DECANOATE 100 MG/ML
5 INJECTION INTRAMUSCULAR EVERY 12 HOURS
Refills: 0 | Status: DISCONTINUED | OUTPATIENT
Start: 2020-10-19 | End: 2020-10-20

## 2020-10-19 RX ORDER — HYDROXYZINE HCL 10 MG
25 TABLET ORAL EVERY 12 HOURS
Refills: 0 | Status: DISCONTINUED | OUTPATIENT
Start: 2020-10-19 | End: 2020-10-27

## 2020-10-19 RX ORDER — HALOPERIDOL DECANOATE 100 MG/ML
100 INJECTION INTRAMUSCULAR ONCE
Refills: 0 | Status: COMPLETED | OUTPATIENT
Start: 2020-10-19 | End: 2020-10-19

## 2020-10-19 RX ADMIN — HALOPERIDOL DECANOATE 5 MILLIGRAM(S): 100 INJECTION INTRAMUSCULAR at 20:48

## 2020-10-19 RX ADMIN — HALOPERIDOL DECANOATE 100 MILLIGRAM(S): 100 INJECTION INTRAMUSCULAR at 10:32

## 2020-10-19 NOTE — PROGRESS NOTE BEHAVIORAL HEALTH - SUMMARY
63 year old female , domiciled in nursing home, with history of COPD, DVT, bipolar, and dementia, multiple prior inpatient psych, was at [place] for 2 years, recently discharged from inpatient psych [facility] transferred to [facility], sent to the ER for continuous medication non-compliant for the past months, worsening psychosis and agitation.    Patient presents with decompensated psychiatric symptoms, secondary to medication non-compliance. Disorganized behavior noted and underlying psychosis appears to be impairing patient's judgment and behavior. Patient appears to be a danger to herself at this time due to patient unable to care for herself at this time. Patient would benefit form IPP for safety and stabilization.    Thoughts continue to be disorganized, illogical. Patient continues to present with paranoid delusions, less psychotic. Continues to present with poor insight and judgement.  Denies depression and anxiety. Denies suicidal/homicidal ideations. Denies A/V hallucinations. Patient in good behavioral control. Visible on the unit engaging with peers and attending groups.    #Admit 2PC (9:27)    Plan:    #Schizophrenia  -Haldol 5mg Q12  -TOO in place Haldol IM 5mg Q12 if patient refuses PO   -Haldol dec given 9/21/20  -Haldol dec given 10/19/20      #Tobacco Use Disorder  -Nicotine Gum PRN    #COPD  -Duoneb PRN for SOB    -Haldol 2mg Q8 PRN for Severe agitation  -Lorazepam 2mg Q12 PRN for Physical aggression  -Hydroxyzine 25mg Q12 PRN for anxiety    -Tylenol PRN for pain  -Pantoprazole for GERD  -Maalox PRN for dyspepsia    #Handoff  -Haldol dec given 9/21  -Haldol dec given 10/19  -Duoneb PRN for SOB 63 year old female , domiciled in nursing home, with history of COPD, DVT, bipolar, and dementia, multiple prior inpatient psych, was at [place] for 2 years, recently discharged from inpatient psych [facility] transferred to [facility], sent to the ER for continuous medication non-compliant for the past months, worsening psychosis and agitation.    Patient presents with decompensated psychiatric symptoms, secondary to medication non-compliance. Disorganized behavior noted and underlying psychosis appears to be impairing patient's judgment and behavior. Patient appears to be a danger to herself at this time due to patient unable to care for herself at this time. Patient would benefit form IPP for safety and stabilization.    Thoughts continue to be disorganized, illogical. Patient continues to present with paranoid delusions, less psychotic. Continues to present with poor insight and judgement.  Denies depression and anxiety. Denies suicidal/homicidal ideations. Denies A/V hallucinations. Patient in good behavioral control. Visible on the unit engaging with peers and attending groups.    #Admit 2PC (9:27)    Plan:    #Schizophrenia  -Haldol 5mg Q12  -TOO in place Haldol IM 5mg Q12 if patient refuses PO   -Haldol dec given 9/21/20  -Haldol dec given 10/19/20      #Tobacco Use Disorder  -Nicotine Gum PRN    #COPD  -Duoneb PRN for SOB    -Haldol 2mg Q8 PRN for Severe agitation  -Lorazepam 1mg Q12 PRN for Physical aggression  -Hydroxyzine 25mg Q12 PRN for anxiety    -Tylenol PRN for pain  -Pantoprazole for GERD  -Maalox PRN for dyspepsia    #Handoff  -Haldol dec given 9/21  -Haldol dec given 10/19  -Duoneb PRN for SOB

## 2020-10-19 NOTE — PROGRESS NOTE BEHAVIORAL HEALTH - NSBHFUPINTERVALHXFT_PSY_A_CORE
Patient seen and evaluated in treatment team, chart reviewed. As per nursing report no acute events over night. Patient compliant with psychiatric meds. TOO in place to ensure medication compliance. On evaluation patient calm and cooperative, in good behavioral control. Patient continues to have impaired concentration. Thoughts continue to be disorganized and illogical at times. Patient continues to present with paranoid delusions, less psychotic. Continues to present with poor insight and judgement. Denies depression and anxiety. Denies suicidal/homicidal ideations. Denies A/V hallucinations. Patient visible on the unit in good behavioral control, engaging with peers and attending groups.    NICANOR done; IPRO level 2 done in preparation for d/c    #Handoff  -Haldol dec given 9/21  -Haldol dec given 10/19  -Duoneb PRN for SOB Patient seen and evaluated, chart reviewed. As per nursing report no acute events over night. Patient compliant with psychiatric meds. TOO in place to ensure medication compliance. On evaluation patient calm and cooperative, in good behavioral control. Patient continues to have impaired concentration. Thoughts continue to be disorganized and illogical at times. Patient continues to present with paranoid delusions, less psychotic. Continues to present with poor insight and judgement. Denies depression and anxiety. Denies suicidal/homicidal ideations. Denies A/V hallucinations. Patient visible on the unit in good behavioral control, engaging with peers and attending groups.    NICANOR done; IPRO level 2 done in preparation for d/c    #Handoff  -Haldol dec given 9/21  -Haldol dec given 10/19  -Duoneb PRN for SOB Patient seen and evaluated, chart reviewed. As per nursing report no acute events over night. Patient compliant with psychiatric meds. TOO in place to ensure medication compliance. On evaluation patient calm and cooperative, in good behavioral control. Patient continues to have impaired concentration. Thoughts continue to be disorganized and illogical at times. Patient continues to present with paranoid delusions, less psychotic. Continues to present with poor insight and judgement. Denies depression and anxiety. Denies suicidal/homicidal ideations. Denies A/V hallucinations. Patient visible on the unit in good behavioral control, engaging with peers and attending groups.    Patient had a visit from ex  Angus [phone]. Writer also met with ex  during visit. Patient very pleased and in good behavioral control. Per Angus patient appears better.     NICANOR done; IPRO level 2 done in preparation for d/c    #Handoff  -Haldol dec given 9/21  -Haldol dec given 10/19  -Duoneb PRN for SOB

## 2020-10-19 NOTE — PROGRESS NOTE BEHAVIORAL HEALTH - NSBHCHARTREVIEWVS_PSY_A_CORE FT
Vital Signs Last 24 Hrs  T(C): 36.1 (19 Oct 2020 09:03), Max: 36.1 (19 Oct 2020 09:03)  T(F): 96.9 (19 Oct 2020 09:03), Max: 96.9 (19 Oct 2020 09:03)  HR: 87 (19 Oct 2020 09:03) (87 - 87)  BP: 108/75 (19 Oct 2020 09:03) (108/75 - 108/75)  BP(mean): --  RR: 16 (19 Oct 2020 09:03) (16 - 16)  SpO2: --

## 2020-10-19 NOTE — CHART NOTE - NSCHARTNOTEFT_GEN_A_CORE
The treatment team met with pt. to review treatment plan, medication and discharge plan.  Pt. remains calm and cooperative and in good behavioral control.  She has been taking her medication as prescribed and TOO remains in place.  Pt. remains disorganized, illogical and delusional.  She does not engage in any meaningful dialogue other than to discuss her discharge.  She denies any suicidal or homicidal ideations or any A/V hallucinations.  Pt. remains active and present on the unit.    Writer is in the process of referring pt. back to the [facility].  The results of the Level 2 screening were sent on this date.  They are now asking for an updated NICANOR, updated progress notes and medication list.  Writer has requested an updated NICANOR.  If pt. is not accepted back to the [facility], [place] [facility]    Mental Status Exam:    Mood-  Euthymic    Sleep-  Normal    Appetite-  Normal    ADL's-  Fair    Thought Process-  Disorganzied/Delusional/Illogical    Observation-  Routine    Pt. is not yet ready for discharge at this time.

## 2020-10-20 PROCEDURE — 99231 SBSQ HOSP IP/OBS SF/LOW 25: CPT

## 2020-10-20 RX ORDER — HALOPERIDOL DECANOATE 100 MG/ML
7 INJECTION INTRAMUSCULAR AT BEDTIME
Refills: 0 | Status: DISCONTINUED | OUTPATIENT
Start: 2020-10-20 | End: 2020-10-30

## 2020-10-20 RX ORDER — HALOPERIDOL DECANOATE 100 MG/ML
5 INJECTION INTRAMUSCULAR DAILY
Refills: 0 | Status: DISCONTINUED | OUTPATIENT
Start: 2020-10-21 | End: 2020-10-30

## 2020-10-20 RX ADMIN — Medication 1 MILLIGRAM(S): at 11:51

## 2020-10-20 RX ADMIN — Medication 1 MILLIGRAM(S): at 00:13

## 2020-10-20 RX ADMIN — HALOPERIDOL DECANOATE 5 MILLIGRAM(S): 100 INJECTION INTRAMUSCULAR at 08:10

## 2020-10-20 RX ADMIN — Medication 1 MILLIGRAM(S): at 20:01

## 2020-10-20 RX ADMIN — HALOPERIDOL DECANOATE 7 MILLIGRAM(S): 100 INJECTION INTRAMUSCULAR at 20:01

## 2020-10-20 NOTE — PROGRESS NOTE BEHAVIORAL HEALTH - NSBHFUPINTERVALHXFT_PSY_A_CORE
Patient seen and evaluated, chart reviewed. As per nursing report patient continues to require PRN ativan for anxiety/agitation at night. Patient compliant with psychiatric meds. TOO in place to ensure medication compliance. On evaluation patient calm and cooperative, in good behavioral control. Patient continues to have impaired concentration. Thoughts continue to be disorganized and illogical at times. Patient continues to present with paranoid delusions. Periods of anxiety and agitation. Continues to present with poor insight and judgement. Denies depression and anxiety. Denies suicidal/homicidal ideations. Denies A/V hallucinations. Patient visible on the unit engaging with peers and attending groups. Will make ativan 1mg bedtime standing and increase bedtime dose of haldol from 5mg to 7mg.  NICANOR done; IPRO level 2 done in preparation for d/c    #Handoff  -Haldol dec given 9/21  -Haldol dec given 10/19  -Ativan 1mg bedtime   -increased bedtime dose of haldol from 5mg to 7mg  -Duoneb PRN for SOB Patient seen and evaluated, chart reviewed. As per nursing report patient continues to require PRN ativan for anxiety/agitation at night. Patient compliant with psychiatric meds. TOO in place to ensure medication compliance. On evaluation patient calm and cooperative, in good behavioral control stating "I had a good visit yesterday". Patient continues to have impaired concentration. Thoughts continue to be disorganized and illogical at times. States "My family has been killed by the [place] family"  Patient continues to present with paranoid delusions. Periods of anxiety and agitation. Continues to present with poor insight and judgement. Denies depression and anxiety. Denies suicidal/homicidal ideations. Denies A/V hallucinations. Patient visible on the unit engaging with peers and attending groups. Will make ativan 1mg bedtime standing and increase bedtime dose of haldol from 5mg to 7mg.    NICANOR done; IPRO level 2 done in preparation for d/c    #Handoff  -Haldol dec given 9/21  -Haldol dec given 10/19  -Ativan 1mg bedtime   -increased bedtime dose of haldol from 5mg to 7mg  -Duoneb PRN for SOB Patient seen and evaluated, chart reviewed. As per nursing report patient continues to require PRN ativan for anxiety/agitation at night. Patient compliant with psychiatric meds. TOO in place to ensure medication compliance. On evaluation patient calm and cooperative, in good behavioral control stating "I had a good visit yesterday". Patient continues to have impaired concentration. Thoughts continue to be disorganized and illogical at times. States "My family has been killed by the [place] family"  Patient continues to present with paranoid delusions. Periods of anxiety and agitation. Continues to present with poor insight and judgement. Denies depression and anxiety. Denies suicidal/homicidal ideations. Denies A/V hallucinations. Patient visible on the unit engaging with peers and attending groups. Will make ativan 1mg bedtime standing and increase bedtime dose of haldol from 5mg to 7mg.    Patient later noted to be crying. Complaintes of anxiety, worried about family. Writer provided emotional support. Ativan PRN offered and accepted with good effect.    Left message for patients daughter Hannah Frazier [phone] to update her on patients care, treatment and progress.    NICANOR done; IPRO level 2 done in preparation for d/c    #Handoff  -Haldol dec given 9/21  -Haldol dec given 10/19  -Ativan 1mg bedtime   -increased bedtime dose of haldol from 5mg to 7mg  -Duoneb PRN for SOB

## 2020-10-20 NOTE — PROGRESS NOTE BEHAVIORAL HEALTH - NSBHCHARTREVIEWVS_PSY_A_CORE FT
Vital Signs Last 24 Hrs-Patient refused  T(C): --  T(F): --  HR: --  BP: --  BP(mean): --  RR: --  SpO2: -- Vital Signs Last 24 Hrs- Patient refused  T(C): --  T(F): --  HR: --  BP: --  BP(mean): --  RR: --  SpO2: --

## 2020-10-20 NOTE — PROGRESS NOTE BEHAVIORAL HEALTH - SUMMARY
63 year old female , domiciled in nursing home, with history of COPD, DVT, bipolar, and dementia, multiple prior inpatient psych, was at [place] for 2 years, recently discharged from inpatient psych [facility] transferred to [facility], sent to the ER for continuous medication non-compliant for the past months, worsening psychosis and agitation.    Patient presents with decompensated psychiatric symptoms, secondary to medication non-compliance. Disorganized behavior noted and underlying psychosis appears to be impairing patient's judgment and behavior. Patient appears to be a danger to herself at this time due to patient unable to care for herself at this time. Patient would benefit form IPP for safety and stabilization.    Thoughts continue to be disorganized, illogical. Patient continues to present with paranoid delusions. Continues to present with poor insight and judgement. Periods of anxiety and agitation. Denies depression and anxiety. Denies suicidal/homicidal ideations. Denies A/V hallucinations. Patient in good behavioral control. Visible on the unit engaging with peers and attending groups.    #Admit 2PC (9:27)    Plan:    #Schizophrenia  -Haldol 5mg daily in am  -TOO in place Haldol IM 5mg daily if patient refuses PO   -Haldol 7mg bedtime  -TOO in place Haldol IM 7mg bedtime if patient refuses PO   -Haldol dec given 9/21/20  -Haldol dec given 10/19/20    #Anxiety/agitation  -Ativan 1mg bedtime    #Tobacco Use Disorder  -Nicotine Gum PRN    #COPD  -Duoneb PRN for SOB    -Haldol 2mg Q8 PRN for Severe agitation  -Lorazepam 1mg Q12 PRN for Physical aggression  -Hydroxyzine 25mg Q12 PRN for anxiety    -Tylenol PRN for pain  -Pantoprazole for GERD  -Maalox PRN for dyspepsia    #Handoff  -Haldol dec given 9/21  -Haldol dec given 10/19  -Ativan 1mg bedtime   -increased bedtime dose of haldol from 5mg to 7mg  -Duoneb PRN for SOB

## 2020-10-21 PROCEDURE — 99231 SBSQ HOSP IP/OBS SF/LOW 25: CPT

## 2020-10-21 RX ORDER — HALOPERIDOL DECANOATE 100 MG/ML
2 INJECTION INTRAMUSCULAR EVERY 8 HOURS
Refills: 0 | Status: DISCONTINUED | OUTPATIENT
Start: 2020-10-21 | End: 2020-11-20

## 2020-10-21 RX ADMIN — HALOPERIDOL DECANOATE 5 MILLIGRAM(S): 100 INJECTION INTRAMUSCULAR at 10:05

## 2020-10-21 RX ADMIN — Medication 1 MILLIGRAM(S): at 20:21

## 2020-10-21 RX ADMIN — HALOPERIDOL DECANOATE 7 MILLIGRAM(S): 100 INJECTION INTRAMUSCULAR at 20:21

## 2020-10-21 NOTE — PROGRESS NOTE BEHAVIORAL HEALTH - NSBHCHARTREVIEWVS_PSY_A_CORE FT
Vital Signs Last 24 Hrs  T(C): --  T(F): --  HR: 103 (21 Oct 2020 08:29) (103 - 103)  BP: 127/58 (21 Oct 2020 08:29) (127/58 - 127/58)  BP(mean): --  RR: 18 (21 Oct 2020 08:29) (18 - 18)  SpO2: --

## 2020-10-21 NOTE — PROGRESS NOTE BEHAVIORAL HEALTH - SUMMARY
63 year old female , domiciled in nursing home, with history of COPD, DVT, bipolar, and dementia, multiple prior inpatient psych, was at [place] for 2 years, recently discharged from inpatient psych [facility] transferred to [facility], sent to the ER for continuous medication non-compliant for the past months, worsening psychosis and agitation.    Patient presents with decompensated psychiatric symptoms, secondary to medication non-compliance. Disorganized behavior noted and underlying psychosis appears to be impairing patient's judgment and behavior. Patient appears to be a danger to herself at this time due to patient unable to care for herself at this time. Patient would benefit form IPP for safety and stabilization.    Thoughts continue to be disorganized, illogical. Patient continues to present with paranoid delusions. Continues to present with poor insight and judgement. Denies depression and anxiety. Denies suicidal/homicidal ideations. Denies A/V hallucinations. Patient in good behavioral control. Visible on the unit engaging with peers and attending groups.    #Admit 2PC (9:27)    Plan:    #Schizophrenia  -Haldol 5mg daily in am  -TOO in place Haldol IM 5mg daily if patient refuses PO   -Haldol 7mg bedtime  -TOO in place Haldol IM 7mg bedtime if patient refuses PO   -Haldol dec given 9/21/20  -Haldol dec given 10/19/20    #Anxiety/agitation  -Ativan 1mg bedtime    #Tobacco Use Disorder  -Nicotine Gum PRN    #COPD  -Duoneb PRN for SOB    -Haldol 2mg Q8 PRN for Severe agitation  -Lorazepam 1mg Q12 PRN for Physical aggression  -Hydroxyzine 25mg Q12 PRN for anxiety    -Tylenol PRN for pain  -Pantoprazole for GERD  -Maalox PRN for dyspepsia    #Handoff  -Haldol dec given 9/21  -Haldol dec given 10/19  -Duoneb PRN for SOB

## 2020-10-21 NOTE — PROGRESS NOTE BEHAVIORAL HEALTH - NSBHFUPINTERVALHXFT_PSY_A_CORE
Patient seen and evaluated, chart reviewed. As per nursing report no acute events over night. Patient compliant with psychiatric meds. TOO in place to ensure medication compliance. On evaluation patient calm and cooperative, in good behavioral control stating. Patient continues to have impaired concentration. Thoughts continue to be disorganized and illogical at times. Patient continues to present with paranoid delusions. Continues to present with poor insight and judgement. Haldol night time dose increase yesterday. Patient endorses no side effects/ adverse reactions. No side effects/adverse reactions noted. Denies depression and anxiety. Denies suicidal/homicidal ideations. Denies A/V hallucinations. Patient visible on the unit engaging with peers and attending groups.     NICANOR done; IPRO level 2 done in preparation for d/c    #Handoff  -Haldol dec given 9/21  -Haldol dec given 10/19  -Duoneb PRN for SOB Patient seen and evaluated, chart reviewed. As per nursing report no acute events over night. Patient compliant with psychiatric meds. TOO in place to ensure medication compliance. On evaluation patient calm and cooperative, in good behavioral control. Patient continues to have impaired concentration. Thoughts continue to be disorganized and illogical at times. Patient continues to present with paranoid delusions. Continues to present with poor insight and judgement. Haldol night time dose increased yesterday. Patient endorses no side effects/ adverse reactions. No side effects/adverse reactions noted. Denies depression and anxiety. Denies suicidal/homicidal ideations. Denies A/V hallucinations. Patient visible on the unit engaging with peers and attending groups.     NICANOR done; IPRO level 2 done in preparation for d/c    #Handoff  -Haldol dec given 9/21  -Haldol dec given 10/19  -Duoneb PRN for SOB Patient seen and evaluated, chart reviewed. As per nursing report no acute events over night. Patient compliant with psychiatric meds. TOO in place to ensure medication compliance. On evaluation patient calm and cooperative, in good behavioral control. Patient continues to have impaired concentration. Thoughts continue to be disorganized and illogical at times. Patient continues to present with paranoid delusions. Continues to present with poor insight and judgement. Haldol night time dose increased yesterday. Patient endorses no side effects/ adverse reactions. No side effects/adverse reactions noted. Denies depression and anxiety. Denies suicidal/homicidal ideations. Denies A/V hallucinations. Patient visible on the unit engaging with peers.    NICANOR done; IPRO level 2 done in preparation for d/c    #Handoff  -Haldol dec given 9/21  -Haldol dec given 10/19  -Duoneb PRN for SOB

## 2020-10-22 PROCEDURE — 99231 SBSQ HOSP IP/OBS SF/LOW 25: CPT

## 2020-10-22 RX ADMIN — HALOPERIDOL DECANOATE 5 MILLIGRAM(S): 100 INJECTION INTRAMUSCULAR at 08:28

## 2020-10-22 RX ADMIN — HALOPERIDOL DECANOATE 7 MILLIGRAM(S): 100 INJECTION INTRAMUSCULAR at 20:18

## 2020-10-22 RX ADMIN — Medication 1 MILLIGRAM(S): at 20:19

## 2020-10-22 NOTE — PROGRESS NOTE BEHAVIORAL HEALTH - NSBHFUPINTERVALHXFT_PSY_A_CORE
Patient seen and evaluated, chart reviewed. As per nursing report no acute events over night. Patient compliant with psychiatric meds. TOO in place to ensure medication compliance. On evaluation patient irritable. Stating "when am I getting discharged?" Patient continues to have impaired concentration. Thoughts continue to be disorganized and illogical at times. Patient continues to present with paranoid delusions that people are trying to poison her. Continues to present with poor insight and judgement. Denies depression and anxiety. Denies suicidal/homicidal ideations. Denies A/V hallucinations. Patient visible on the unit engaging with peers.    NICANOR done; IPRO level 2 done in preparation for d/c    #Handoff  -Haldol dec given 9/21  -Haldol dec given 10/19  -Duoneb PRN for SOB

## 2020-10-22 NOTE — PROGRESS NOTE BEHAVIORAL HEALTH - SUMMARY
63 year old female , domiciled in nursing home, with history of COPD, DVT, bipolar, and dementia, multiple prior inpatient psych, was at [place] for 2 years, recently discharged from inpatient psych [facility] transferred to [facility], sent to the ER for continuous medication non-compliant for the past months, worsening psychosis and agitation.    Patient presents with decompensated psychiatric symptoms, secondary to medication non-compliance. Disorganized behavior noted and underlying psychosis appears to be impairing patient's judgment and behavior. Patient appears to be a danger to herself at this time due to patient unable to care for herself at this time. Patient would benefit form IPP for safety and stabilization.    Thoughts continue to be disorganized, illogical. Patient continues to present with paranoid delusions. Continues to present with poor insight and judgement. Denies depression and anxiety. Denies suicidal/homicidal ideations. Denies A/V hallucinations. Patient in good behavioral control. Visible on the unit engaging with peers.    #Admit 2PC (9:27)    Plan:    #Schizophrenia  -Haldol 5mg daily in am  -TOO in place Haldol IM 5mg daily if patient refuses PO   -Haldol 7mg bedtime  -TOO in place Haldol IM 7mg bedtime if patient refuses PO   -Haldol dec given 9/21/20  -Haldol dec given 10/19/20    #Anxiety/agitation  -Ativan 1mg bedtime    #Tobacco Use Disorder  -Nicotine Gum PRN    #COPD  -Duoneb PRN for SOB    -Haldol 2mg Q8 PRN for Severe agitation  -Lorazepam 1mg Q12 PRN for Physical aggression  -Hydroxyzine 25mg Q12 PRN for anxiety    -Tylenol PRN for pain  -Pantoprazole for GERD  -Maalox PRN for dyspepsia    #Handoff  -Haldol dec given 9/21  -Haldol dec given 10/19  -Duoneb PRN for SOB

## 2020-10-23 PROCEDURE — 99231 SBSQ HOSP IP/OBS SF/LOW 25: CPT

## 2020-10-23 RX ADMIN — HALOPERIDOL DECANOATE 5 MILLIGRAM(S): 100 INJECTION INTRAMUSCULAR at 08:16

## 2020-10-23 RX ADMIN — HALOPERIDOL DECANOATE 7 MILLIGRAM(S): 100 INJECTION INTRAMUSCULAR at 22:18

## 2020-10-23 RX ADMIN — Medication 1 MILLIGRAM(S): at 01:57

## 2020-10-23 RX ADMIN — PANTOPRAZOLE SODIUM 40 MILLIGRAM(S): 20 TABLET, DELAYED RELEASE ORAL at 08:16

## 2020-10-23 RX ADMIN — Medication 1 MILLIGRAM(S): at 22:18

## 2020-10-23 NOTE — PROGRESS NOTE BEHAVIORAL HEALTH - SUMMARY
63 year old female , domiciled in nursing home, with history of COPD, DVT, bipolar, and dementia, multiple prior inpatient psych, was at [place] for 2 years, recently discharged from inpatient psych [facility] transferred to [facility], sent to the ER for continuous medication non-compliant for the past months, worsening psychosis and agitation.    Patient presents with decompensated psychiatric symptoms, secondary to medication non-compliance. Disorganized behavior noted and underlying psychosis appears to be impairing patient's judgment and behavior. Patient appears to be a danger to herself at this time due to patient unable to care for herself at this time. Patient would benefit form IPP for safety and stabilization.    Thoughts continue to be disorganized, illogical. Patient continues to present with paranoid delusions. Continues to present with poor insight and judgement. Denies depression and anxiety. Denies suicidal/homicidal ideations. Denies A/V hallucinations. Patient in good behavioral control. Visible on the unit engaging with peers.    #Admit 2PC (9:27)    Plan:    #Schizophrenia  -Haldol 5mg daily in am  -TOO in place Haldol IM 5mg daily if patient refuses PO   -Haldol 7mg bedtime  -TOO in place Haldol IM 7mg bedtime if patient refuses PO   -Haldol dec given 9/21/20  -Haldol dec given 10/19/20    #Anxiety/agitation  -Ativan 1mg bedtime    #Tobacco Use Disorder  -Nicotine Gum PRN    #COPD  -Duoneb PRN for SOB    -Haldol 2mg Q8 PRN for Severe agitation  -Lorazepam 1mg Q12 PRN for Physical aggression  -Hydroxyzine 25mg Q12 PRN for anxiety    -Tylenol PRN for pain  -Pantoprazole for GERD  -Maalox PRN for dyspepsia    #Handoff  -Haldol dec given 9/21  -Haldol dec given 10/19  -Duoneb PRN for SOB

## 2020-10-23 NOTE — PROGRESS NOTE BEHAVIORAL HEALTH - NSBHFUPINTERVALHXFT_PSY_A_CORE
Patient seen and evaluated, chart reviewed. As per nursing report requires PRN ativan with good effect. Patient compliant with psychiatric meds. TOO in place to ensure medication compliance. On evaluation patient irritable. Stating "when am I getting out of here?" Patient continues to have impaired concentration. Thoughts continue to be disorganized and illogical at times. Patient continues to present with paranoid delusions. Continues to present with poor insight and judgement. Denies depression and anxiety. Denies suicidal/homicidal ideations. Denies A/V hallucinations. Patient visible on the unit engaging with peers.    NICANOR done; IPRO level 2 done in preparation for d/c    #Handoff  -Haldol dec given 9/21  -Haldol dec given 10/19  -Duoneb PRN for SOB Patient seen and evaluated, chart reviewed. As per nursing report required PRN ativan for anxiety/agitation with good effect. Patient compliant with psychiatric meds. TOO in place to ensure medication compliance. On evaluation patient irritable. Stating "when am I getting out of here?" Patient continues to have impaired concentration. Thoughts continue to be disorganized and illogical at times. Patient continues to present with paranoid delusions. Continues to present with poor insight and judgement. Denies depression and anxiety. Denies suicidal/homicidal ideations. Denies A/V hallucinations. Patient visible on the unit engaging with peers.      #Handoff  -Haldol dec given 9/21  -Haldol dec given 10/19  -Duoneb PRN for SOB

## 2020-10-23 NOTE — PROGRESS NOTE BEHAVIORAL HEALTH - NSBHCHARTREVIEWVS_PSY_A_CORE FT
Vital Signs Last 24 Hrs  T(C): 36.2 (23 Oct 2020 08:22), Max: 36.2 (23 Oct 2020 08:22)  T(F): 97.1 (23 Oct 2020 08:22), Max: 97.1 (23 Oct 2020 08:22)  HR: 69 (23 Oct 2020 08:22) (69 - 69)  BP: 133/70 (23 Oct 2020 08:22) (133/70 - 133/70)  BP(mean): --  RR: 18 (23 Oct 2020 08:22) (18 - 18)  SpO2: --

## 2020-10-23 NOTE — CHART NOTE - NSCHARTNOTEFT_GEN_A_CORE
The treatment team met with pt. to review treatment plan, medication and discharge plan.  Pt. does not present with any change in mental status at this time.  She presents as disorganized, delusional and paranoid.  Pt. remains preoccupied with discharge and paces the unit making non-sensical statements.  She periodically requires PRN's for agitation.  TOO is in place and pt. is taking her medication as prescribed. Due to acute symptoms, pt. does not engage in any meaningful dialogue.    Writer spoke with the admissions department at [facility].  They state they do not see in any changes in pt. since she was admitted to this unit and can not accept her back until she is much more stable.  Writer requested more specific information and was told she would receive a call back.  As per the treatment team, pt. will likely be referred [place] [facility] as she is not further responding to medication.  Writer attempted to contact pt.'s daughter on this date, however, the voicemail was full.    Mental Status Exam:    Mood-  Irritable    Sleep-  Normal    Appetite-  Normal    ADL's-  Fair    Thought Process-  Disorganized/Tangential/Paranoid    Observation-  Routine    Pt. is not psychiatrically stable for discharge at this time.

## 2020-10-24 RX ADMIN — Medication 1 MILLIGRAM(S): at 20:37

## 2020-10-24 RX ADMIN — HALOPERIDOL DECANOATE 5 MILLIGRAM(S): 100 INJECTION INTRAMUSCULAR at 08:09

## 2020-10-24 RX ADMIN — HALOPERIDOL DECANOATE 7 MILLIGRAM(S): 100 INJECTION INTRAMUSCULAR at 20:37

## 2020-10-24 RX ADMIN — Medication 650 MILLIGRAM(S): at 04:16

## 2020-10-25 RX ADMIN — Medication 1 MILLIGRAM(S): at 01:33

## 2020-10-25 RX ADMIN — HALOPERIDOL DECANOATE 5 MILLIGRAM(S): 100 INJECTION INTRAMUSCULAR at 08:20

## 2020-10-25 RX ADMIN — Medication 1 MILLIGRAM(S): at 20:31

## 2020-10-25 RX ADMIN — HALOPERIDOL DECANOATE 7 MILLIGRAM(S): 100 INJECTION INTRAMUSCULAR at 20:30

## 2020-10-25 NOTE — PROGRESS NOTE BEHAVIORAL HEALTH - NSBHCHARTREVIEWVS_PSY_A_CORE FT
Vital Signs Last 24 Hrs  T(C): 36.1 (25 Oct 2020 08:02), Max: 36.1 (25 Oct 2020 08:02)  T(F): 97 (25 Oct 2020 08:02), Max: 97 (25 Oct 2020 08:02)  HR: 96 (25 Oct 2020 08:02) (96 - 96)  BP: 120/71 (25 Oct 2020 08:02) (120/71 - 120/71)  BP(mean): --  RR: 18 (25 Oct 2020 08:02) (18 - 18)  SpO2: --

## 2020-10-25 NOTE — PROGRESS NOTE BEHAVIORAL HEALTH - NSBHFUPINTERVALHXFT_PSY_A_CORE
Patient seen and evaluated, chart reviewed. As per nursing report required PRN ativan for anxiety/agitation with good effect. . TOO in place to ensure medication compliance. On evaluation patient irritable. Stating "when am I getting out of here?" Patient continues to have impaired concentration. Thoughts continue to be disorganized and illogical at times. Patient continues to present with paranoid delusions. Continues to present with poor insight and judgement. Denies depression and anxiety. Denies suicidal/homicidal ideations. Denies A/V hallucinations. Patient visible on the unit engaging with peers.  pt has been  been  on the phone . pt  has h/o false accusation       #Handoff  -Haldol dec given 9/21  -Haldol dec given 10/19  -Duoneb PRN for SOB

## 2020-10-25 NOTE — PROGRESS NOTE BEHAVIORAL HEALTH - SUMMARY
63 year old female , domiciled in nursing home, with history of COPD, DVT, bipolar, and dementia, multiple prior inpatient psych, was at [place] for 2 years, recently discharged from inpatient psych [facility] transferred to [facility], sent to the ER for continuous medication non-compliant for the past months, worsening psychosis and agitation.    Patient presents with decompensated psychiatric symptoms, secondary to medication non-compliance. Disorganized behavior noted and underlying psychosis appears to be impairing patient's judgment and behavior. Patient appears to be a danger to herself at this time due to patient unable to care for herself at this time. Patient would benefit form IPP for safety and stabilization.    Thoughts continue to be disorganized, illogical. Patient continues to present with paranoid delusions. Continues to present with poor insight and judgement. Denies depression and anxiety. Denies suicidal/homicidal ideations. Denies A/V hallucinations. Patient in good behavioral control. Visible on the unit engaging with peers.    #Admit 2PC (9:27)    Plan:    #Schizophrenia  -Haldol 5mg daily in am  -TOO in place Haldol IM 5mg daily if patient refuses PO   -Haldol 7mg bedtime  -TOO in place Haldol IM 7mg bedtime if patient refuses PO   -Haldol dec given 9/21/20  -Haldol dec given 10/19/20    #Anxiety/agitation  -Ativan 1mg bedtime    #Tobacco Use Disorder  -Nicotine Gum PRN    #COPD  -Duoneb PRN for SOB    -Haldol 2mg Q8 PRN for Severe agitation  -Lorazepam 1mg Q12 PRN for Physical aggression  -Hydroxyzine 25mg Q12 PRN for anxiety    -Tylenol PRN for pain  -Pantoprazole for GERD  -Maalox PRN for dyspepsia    #Handoff  -Haldol dec given 9/21  -Haldol dec given 10/19  -Duoneb PRN for SOB

## 2020-10-26 PROCEDURE — 99231 SBSQ HOSP IP/OBS SF/LOW 25: CPT

## 2020-10-26 RX ADMIN — Medication 1 MILLIGRAM(S): at 20:21

## 2020-10-26 RX ADMIN — Medication 1 MILLIGRAM(S): at 04:28

## 2020-10-26 RX ADMIN — HALOPERIDOL DECANOATE 5 MILLIGRAM(S): 100 INJECTION INTRAMUSCULAR at 08:30

## 2020-10-26 RX ADMIN — HALOPERIDOL DECANOATE 7 MILLIGRAM(S): 100 INJECTION INTRAMUSCULAR at 20:21

## 2020-10-26 NOTE — PROGRESS NOTE BEHAVIORAL HEALTH - NSBHCHARTREVIEWVS_PSY_A_CORE FT
Vital Signs Last 24 Hrs  T(C): 36.1 (26 Oct 2020 06:39), Max: 36.1 (26 Oct 2020 06:39)  T(F): 96.9 (26 Oct 2020 06:39), Max: 96.9 (26 Oct 2020 06:39)  HR: 88 (26 Oct 2020 10:24) (88 - 107)  BP: 124/57 (26 Oct 2020 10:24) (124/57 - 137/77)  BP(mean): --  RR: 16 (26 Oct 2020 10:24) (16 - 16)  SpO2: --

## 2020-10-26 NOTE — PROGRESS NOTE BEHAVIORAL HEALTH - NSBHFUPINTERVALHXFT_PSY_A_CORE
Patient seen and evaluated, chart reviewed. As per nursing report required PRN ativan for anxiety/agitation with good effect. Patient compliant with psychiatric meds. TOO in place to ensure medication compliance. On evaluation patient calm and pleasnat today. Stating "when am I leaving?" Patient continues to have impaired concentration. Thoughts continue to be disorganized and illogical at times. Patient continues to present with paranoid delusions. Continues to present with poor insight and judgement. Denies depression and anxiety. Denies suicidal/homicidal ideations. Denies A/V hallucinations. Patient visible on the unit engaging with peers.      #Handoff  -Haldol dec given 9/21  -Haldol dec given 10/19  -Duoneb PRN for SOB Patient seen and evaluated, chart reviewed. As per nursing report required PRN ativan for anxiety/agitation with good effect. Patient compliant with psychiatric meds. On evaluation patient calm and pleasant today. Stating "when am I leaving?" Patient continues to have impaired concentration. Thoughts continue to be disorganized and illogical at times. Patient continues to present with paranoid delusions. Continues to present with poor insight and judgement. Denies depression and anxiety. Denies suicidal/homicidal ideations. Denies A/V hallucinations. Patient visible on the unit engaging with peers.      #Handoff  -Haldol dec given 9/21  -Haldol dec given 10/19  -Duoneb PRN for SOB

## 2020-10-26 NOTE — PROGRESS NOTE BEHAVIORAL HEALTH - SUMMARY
63 year old female , domiciled in nursing home, with history of COPD, DVT, bipolar, and dementia, multiple prior inpatient psych, was at [place] for 2 years, recently discharged from inpatient psych [facility] transferred to [facility], sent to the ER for continuous medication non-compliant for the past months, worsening psychosis and agitation.    Patient presents with decompensated psychiatric symptoms, secondary to medication non-compliance. Disorganized behavior noted and underlying psychosis appears to be impairing patient's judgment and behavior. Patient appears to be a danger to herself at this time due to patient unable to care for herself at this time. Patient would benefit form IPP for safety and stabilization.    Thoughts continue to be disorganized, illogical. Patient continues to present with paranoid delusions. Continues to present with poor insight and judgement. Denies depression and anxiety. Denies suicidal/homicidal ideations. Denies A/V hallucinations. Patient in good behavioral control. Visible on the unit engaging with peers.    #Admit 2PC (9:27)    Plan:    #Schizophrenia  -Haldol 5mg daily in am  -TOO in place Haldol IM 5mg daily if patient refuses PO   -Haldol 7mg bedtime  -TOO in place Haldol IM 7mg bedtime if patient refuses PO   -Haldol dec given 9/21/20  -Haldol dec given 10/19/20    #Anxiety/agitation  -Ativan 1mg bedtime    #Tobacco Use Disorder  -Nicotine Gum PRN    #COPD  -Duoneb PRN for SOB    -Haldol 2mg Q8 PRN for Severe agitation  -Lorazepam 1mg Q12 PRN for Physical aggression  -Hydroxyzine 25mg Q12 PRN for anxiety    -Tylenol PRN for pain  -Pantoprazole for GERD  -Maalox PRN for dyspepsia    #Handoff  -Haldol dec given 9/21  -Haldol dec given 10/19  -Duoneb PRN for SOB 63 year old female , domiciled in nursing home, with history of COPD, DVT, bipolar, and dementia, multiple prior inpatient psych, was at [place] for 2 years, recently discharged from inpatient psych [facility] transferred to [facility], sent to the ER for continuous medication non-compliant for the past months, worsening psychosis and agitation.    Patient presents with decompensated psychiatric symptoms, secondary to medication non-compliance. Disorganized behavior noted and underlying psychosis appears to be impairing patient's judgment and behavior. Patient appears to be a danger to herself at this time due to patient unable to care for herself at this time. Patient would benefit form IPP for safety and stabilization.    Thoughts continue to be disorganized, illogical. Patient continues to present with paranoid delusions. Continues to present with poor insight and judgement. Denies depression and anxiety. Denies suicidal/homicidal ideations. Denies A/V hallucinations. Patient in good behavioral control. Visible on the unit engaging with peers.    #Admit 2PC (9:27)    Plan:    #Schizophrenia  -Haldol 5mg daily in am  -Haldol 7mg bedtime  -Haldol dec given 9/21/20  -Haldol dec given 10/19/20    #Anxiety/agitation  -Ativan 1mg bedtime    #Tobacco Use Disorder  -Nicotine Gum PRN    #COPD  -Duoneb PRN for SOB    -Haldol 2mg Q8 PRN for Severe agitation  -Lorazepam 1mg Q12 PRN for Physical aggression  -Hydroxyzine 25mg Q12 PRN for anxiety    -Tylenol PRN for pain  -Pantoprazole for GERD  -Maalox PRN for dyspepsia    #Handoff  -Haldol dec given 9/21  -Haldol dec given 10/19  -Duoneb PRN for SOB

## 2020-10-27 PROCEDURE — 99231 SBSQ HOSP IP/OBS SF/LOW 25: CPT

## 2020-10-27 RX ADMIN — HALOPERIDOL DECANOATE 7 MILLIGRAM(S): 100 INJECTION INTRAMUSCULAR at 21:49

## 2020-10-27 RX ADMIN — HALOPERIDOL DECANOATE 5 MILLIGRAM(S): 100 INJECTION INTRAMUSCULAR at 08:16

## 2020-10-27 NOTE — PROGRESS NOTE BEHAVIORAL HEALTH - NSBHFUPINTERVALHXFT_PSY_A_CORE
Patient seen and evaluated, chart reviewed. As per nursing report no acute events over night. Patient compliant with psychiatric meds. TOO in place to ensure medication compliance. On evaluation patient calm and cooperative. Patient continues to have impaired concentration. Thoughts continue to be disorganized and illogical at times. Patient continues to present with paranoid delusions. Continues to present with poor insight and judgement. Denies depression and anxiety. Denies suicidal/homicidal ideations. Denies A/V hallucinations. Patient visible on the unit engaging with peers.      #Handoff  -Haldol dec given 9/21  -Haldol dec given 10/19  -Duoneb PRN for SOB Patient seen and evaluated, chart reviewed. As per nursing report no acute events over night. Patient compliant with psychiatric meds. TOO in place to ensure medication compliance. On evaluation patient calm and cooperative. Patient continues to have impaired concentration. Thoughts continue to be disorganized and illogical at times. Patient continues to present with paranoid delusions. Continues to present with poor insight and judgement. Denies depression and anxiety. Denies suicidal/homicidal ideations. Denies A/V hallucinations. Patient visible on the unit engaging with peers.     Pt. has not been accepted back to the [facility].   An application for state placement in currently in progress as that is the only other viable treatment option.    Left message for patients daughter Hannah Frazier [phone] to update her on patients care, treatment and progress.    #Handoff  -Haldol dec given 9/21  -Haldol dec given 10/19  -Duoneb PRN for SOB Patient seen and evaluated, chart reviewed. As per nursing report no acute events over night. Patient compliant with psychiatric meds. On evaluation patient calm and cooperative. Patient continues to have impaired concentration. Thoughts continue to be disorganized and illogical at times. Patient continues to present with paranoid delusions. Continues to present with poor insight and judgement. Denies depression and anxiety. Denies suicidal/homicidal ideations. Denies A/V hallucinations. Patient visible on the unit engaging with peers.     Pt. has not been accepted back to the [facility].   An application for state placement in currently in progress as that is the only other viable treatment option.    Left message for patients daughter Hannah Frazier [phone] to update her on patients care, treatment and progress.    #Handoff  -Haldol dec given 9/21  -Haldol dec given 10/19  -Duoneb PRN for SOB

## 2020-10-27 NOTE — PROGRESS NOTE BEHAVIORAL HEALTH - SUMMARY
63 year old female , domiciled in nursing home, with history of COPD, DVT, bipolar, and dementia, multiple prior inpatient psych, was at [place] for 2 years, recently discharged from inpatient psych [facility] transferred to [facility], sent to the ER for continuous medication non-compliant for the past months, worsening psychosis and agitation.    Patient presents with decompensated psychiatric symptoms, secondary to medication non-compliance. Disorganized behavior noted and underlying psychosis appears to be impairing patient's judgment and behavior. Patient appears to be a danger to herself at this time due to patient unable to care for herself at this time. Patient would benefit form IPP for safety and stabilization.    Thoughts continue to be disorganized, illogical. Patient continues to present with paranoid delusions. Continues to present with poor insight and judgement. Denies depression and anxiety. Denies suicidal/homicidal ideations. Denies A/V hallucinations. Patient in good behavioral control. Visible on the unit engaging with peers.    #Admit 2PC (9:27)    Plan:    #Schizophrenia  -Haldol 5mg daily in am  -TOO in place Haldol IM 5mg daily if patient refuses PO   -Haldol 7mg bedtime  -TOO in place Haldol IM 7mg bedtime if patient refuses PO   -Haldol dec given 9/21/20  -Haldol dec given 10/19/20    #Anxiety/agitation  -Ativan 1mg bedtime    #Tobacco Use Disorder  -Nicotine Gum PRN    #COPD  -Duoneb PRN for SOB    -Haldol 2mg Q8 PRN for Severe agitation  -Lorazepam 1mg Q12 PRN for Physical aggression  -Hydroxyzine 25mg Q12 PRN for anxiety    -Tylenol PRN for pain  -Pantoprazole for GERD  -Maalox PRN for dyspepsia    #Handoff  -Haldol dec given 9/21  -Haldol dec given 10/19  -Duoneb PRN for SOB 63 year old female , domiciled in nursing home, with history of COPD, DVT, bipolar, and dementia, multiple prior inpatient psych, was at [place] for 2 years, recently discharged from inpatient psych [facility] transferred to [facility], sent to the ER for continuous medication non-compliant for the past months, worsening psychosis and agitation.    Patient presents with decompensated psychiatric symptoms, secondary to medication non-compliance. Disorganized behavior noted and underlying psychosis appears to be impairing patient's judgment and behavior. Patient appears to be a danger to herself at this time due to patient unable to care for herself at this time. Patient would benefit form IPP for safety and stabilization.    Thoughts continue to be disorganized, illogical. Patient continues to present with paranoid delusions. Continues to present with poor insight and judgement. Denies depression and anxiety. Denies suicidal/homicidal ideations. Denies A/V hallucinations. Patient in good behavioral control. Visible on the unit engaging with peers.    #Admit 2PC (9:27)    Plan:    #Schizophrenia  -Haldol 5mg daily in am  -Haldol 7mg bedtime  -Haldol dec given 9/21/20  -Haldol dec given 10/19/20    #Anxiety/agitation  -Ativan 1mg bedtime    #Tobacco Use Disorder  -Nicotine Gum PRN    #COPD  -Duoneb PRN for SOB    -Haldol 2mg Q8 PRN for Severe agitation  -Lorazepam 1mg Q12 PRN for Physical aggression  -Hydroxyzine 25mg Q12 PRN for anxiety    -Tylenol PRN for pain  -Pantoprazole for GERD  -Maalox PRN for dyspepsia    #Handoff  -Haldol dec given 9/21  -Haldol dec given 10/19  -Duoneb PRN for SOB

## 2020-10-28 PROCEDURE — 99231 SBSQ HOSP IP/OBS SF/LOW 25: CPT

## 2020-10-28 RX ORDER — ALBUTEROL 90 UG/1
2 AEROSOL, METERED ORAL EVERY 6 HOURS
Refills: 0 | Status: DISCONTINUED | OUTPATIENT
Start: 2020-10-28 | End: 2020-11-23

## 2020-10-28 RX ADMIN — Medication 1 MILLIGRAM(S): at 22:05

## 2020-10-28 RX ADMIN — Medication 650 MILLIGRAM(S): at 01:37

## 2020-10-28 RX ADMIN — Medication 650 MILLIGRAM(S): at 00:46

## 2020-10-28 RX ADMIN — HALOPERIDOL DECANOATE 7 MILLIGRAM(S): 100 INJECTION INTRAMUSCULAR at 22:06

## 2020-10-28 RX ADMIN — HALOPERIDOL DECANOATE 5 MILLIGRAM(S): 100 INJECTION INTRAMUSCULAR at 08:55

## 2020-10-28 NOTE — PROGRESS NOTE BEHAVIORAL HEALTH - NSBHFUPINTERVALHXFT_PSY_A_CORE
Patient seen and evaluated, chart reviewed. As per nursing report patient complaints of SOB offered nebulizer treatment. Patient refused nebulizer treatment. Albuterol inhaler PRN order put in. Patient compliant with psychiatric meds. TOO in place to ensure medication compliance. On evaluation patient irritable, stating "when am I getting discharged". Patient continues to have impaired concentration. Thoughts continue to be disorganized and illogical at times. Patient continues to present with paranoid delusions. Continues to present with poor insight and judgement. Denies depression and anxiety. Denies suicidal/homicidal ideations. Denies A/V hallucinations. Patient visible on the unit engaging with peers.     Pt. has not been accepted back to the [facility].  An application for state placement in currently in progress as that is the only other viable treatment option.    #Handoff  -Haldol dec given 9/21  -Haldol dec given 10/19  -Duoneb, albuterol inhaler PRN for SOB no Patient seen and evaluated, chart reviewed. As per nursing report patient complaints of SOB offered nebulizer treatment. Patient refused nebulizer treatment. Albuterol inhaler PRN order put in. Patient refusing Haldol this morning but took PO Haldol after much encouragement. TOO in place to ensure medication compliance. On evaluation patient irritable, stating "when am I getting discharged". Patient continues to have impaired concentration. Thoughts continue to be disorganized and illogical at times. Patient continues to present with paranoid delusions. Continues to present with poor insight and judgement. Denies depression and anxiety. Denies suicidal/homicidal ideations. Denies A/V hallucinations. Patient visible on the unit engaging with peers and attending groups.     Pt. has not been accepted back to the [facility].  An application for state placement in currently in progress as that is the only other viable treatment option.    #Handoff  -Haldol dec given 9/21  -Haldol dec given 10/19  -Duoneb, albuterol inhaler PRN for SOB Patient seen and evaluated, chart reviewed. As per nursing report patient complaints of SOB offered nebulizer treatment. Patient refused nebulizer treatment. Albuterol inhaler PRN order put in. Patient refusing Haldol this morning but took PO Haldol after much encouragement.  On evaluation patient irritable, stating "when am I getting discharged". Patient continues to have impaired concentration. Thoughts continue to be disorganized and illogical at times. Patient continues to present with paranoid delusions. Continues to present with poor insight and judgement. Denies depression and anxiety. Denies suicidal/homicidal ideations. Denies A/V hallucinations. Patient visible on the unit engaging with peers and attending groups.     Pt. has not been accepted back to the [facility].  An application for state placement in currently in progress as that is the only other viable treatment option.    #Handoff  -Haldol dec given 9/21  -Haldol dec given 10/19  -Duoneb, albuterol inhaler PRN for SOB

## 2020-10-28 NOTE — PROGRESS NOTE BEHAVIORAL HEALTH - NSBHCHARTREVIEWVS_PSY_A_CORE FT
Vital Signs Last 24 Hrs  T(C): --  T(F): --  HR: 86 (28 Oct 2020 08:43) (74 - 86)  BP: 135/73 (28 Oct 2020 08:43) (106/78 - 135/73)  BP(mean): --  RR: 20 (28 Oct 2020 08:43) (18 - 20)  SpO2: --

## 2020-10-28 NOTE — PROGRESS NOTE BEHAVIORAL HEALTH - SUMMARY
63 year old female , domiciled in nursing home, with history of COPD, DVT, bipolar, and dementia, multiple prior inpatient psych, was at [place] for 2 years, recently discharged from inpatient psych [facility] transferred to [facility], sent to the ER for continuous medication non-compliant for the past months, worsening psychosis and agitation.    Patient presents with decompensated psychiatric symptoms, secondary to medication non-compliance. Disorganized behavior noted and underlying psychosis appears to be impairing patient's judgment and behavior. Patient appears to be a danger to herself at this time due to patient unable to care for herself at this time. Patient would benefit form IPP for safety and stabilization.    Thoughts continue to be disorganized, illogical. Patient continues to present with paranoid delusions. Continues to present with poor insight and judgement. Denies depression and anxiety. Denies suicidal/homicidal ideations. Denies A/V hallucinations. Patient in good behavioral control. Visible on the unit engaging with peers.    #Admit 2PC (9:27)    Plan:    #Schizophrenia  -Haldol 5mg daily in am  -TOO in place Haldol IM 5mg daily if patient refuses PO   -Haldol 7mg bedtime  -TOO in place Haldol IM 7mg bedtime if patient refuses PO   -Haldol dec given 9/21/20  -Haldol dec given 10/19/20    #Anxiety/agitation  -Ativan 1mg bedtime    #Tobacco Use Disorder  -Nicotine Gum PRN    #COPD  -Duoneb PRN for SOB    -Haldol 2mg Q8 PRN for Severe agitation  -Lorazepam 1mg Q12 PRN for Physical aggression  -Hydroxyzine 25mg Q12 PRN for anxiety    -Tylenol PRN for pain  -Pantoprazole for GERD  -Maalox PRN for dyspepsia    #Handoff  -Haldol dec given 9/21  -Haldol dec given 10/19  -Duoneb, albuterol inhaler PRN for SOB 63 year old female , domiciled in nursing home, with history of COPD, DVT, bipolar, and dementia, multiple prior inpatient psych, was at [place] for 2 years, recently discharged from inpatient psych [facility] transferred to [facility], sent to the ER for continuous medication non-compliant for the past months, worsening psychosis and agitation.    Patient presents with decompensated psychiatric symptoms, secondary to medication non-compliance. Disorganized behavior noted and underlying psychosis appears to be impairing patient's judgment and behavior. Patient appears to be a danger to herself at this time due to patient unable to care for herself at this time. Patient would benefit form IPP for safety and stabilization.    Thoughts continue to be disorganized, illogical. Patient continues to present with paranoid delusions. Continues to present with poor insight and judgement. Denies depression and anxiety. Denies suicidal/homicidal ideations. Denies A/V hallucinations. Patient in good behavioral control. Visible on the unit engaging with peers.    #Admit 2PC (9:27)    Plan:    #Schizophrenia  -Haldol 5mg daily in am  -TOO in place Haldol IM 5mg daily if patient refuses PO   -Haldol 7mg bedtime  -TOO in place Haldol IM 7mg bedtime if patient refuses PO   -Haldol dec given 9/21/20  -Haldol dec given 10/19/20    #Anxiety/agitation  -Ativan 1mg bedtime    #Tobacco Use Disorder  -Nicotine Gum PRN    #COPD  -Duoneb PRN for SOB  -Albuterol inhaler PRN    -Haldol 2mg Q8 PRN for Severe agitation  -Lorazepam 1mg Q12 PRN for Physical aggression  -Hydroxyzine 25mg Q12 PRN for anxiety    -Tylenol PRN for pain  -Pantoprazole for GERD  -Maalox PRN for dyspepsia    #Handoff  -Haldol dec given 9/21  -Haldol dec given 10/19  -Duoneb, albuterol inhaler PRN for SOB 63 year old female , domiciled in nursing home, with history of COPD, DVT, bipolar, and dementia, multiple prior inpatient psych, was at [place] for 2 years, recently discharged from inpatient psych [facility] transferred to [facility], sent to the ER for continuous medication non-compliant for the past months, worsening psychosis and agitation.    Patient presents with decompensated psychiatric symptoms, secondary to medication non-compliance. Disorganized behavior noted and underlying psychosis appears to be impairing patient's judgment and behavior. Patient appears to be a danger to herself at this time due to patient unable to care for herself at this time. Patient would benefit form IPP for safety and stabilization.    Thoughts continue to be disorganized, illogical. Patient continues to present with paranoid delusions. Continues to present with poor insight and judgement. Denies depression and anxiety. Denies suicidal/homicidal ideations. Denies A/V hallucinations. Patient in good behavioral control. Visible on the unit engaging with peers.    #Admit 2PC (9:27)    Plan:    #Schizophrenia  -Haldol 5mg daily in am  -Haldol 7mg bedtime  -Haldol dec given 9/21/20  -Haldol dec given 10/19/20    #Anxiety/agitation  -Ativan 1mg bedtime    #Tobacco Use Disorder  -Nicotine Gum PRN    #COPD  -Duoneb PRN for SOB  -Albuterol inhaler PRN    -Haldol 2mg Q8 PRN for Severe agitation  -Lorazepam 1mg Q12 PRN for Physical aggression  -Hydroxyzine 25mg Q12 PRN for anxiety    -Tylenol PRN for pain  -Pantoprazole for GERD  -Maalox PRN for dyspepsia    #Handoff  -Haldol dec given 9/21  -Haldol dec given 10/19  -Duoneb, albuterol inhaler PRN for SOB

## 2020-10-28 NOTE — CHART NOTE - NSCHARTNOTEFT_GEN_A_CORE
The treatment team met with pt. to review treatment plan, medications and discharge plan.  There is no change in pt.'s mental status at this time.  She remains disorganized, paranoid and delusional.  TOO remains in place and pt. has been taking her medication.  She remains preoccupied with being discharged.  Pt. does not otherwise engage in any meaningful dialogue.  She is frequently is present on the unit and has been calm the past several days.  Writer has attempted to contact pt.'s daughter to discuss discharge planning.    An application for state placement was submitted on this date.  Writer attempted to contact pt''s daughter, Carol Ann Frazier, however, her voicemail was full.  Writer will continue outreach attempts.  Writer will also follow up with state placement referral.    Mental Status Exam:    Mood-  Labile    Sleep-  Normal    Appetite-  Normal    ADL's-  Fair    Thought process-  Disorganized/Illogical/Paranoid    Observation-  Routine    Pt. is not stable for discharge at this time.

## 2020-10-29 PROCEDURE — 99231 SBSQ HOSP IP/OBS SF/LOW 25: CPT

## 2020-10-29 RX ADMIN — HALOPERIDOL DECANOATE 5 MILLIGRAM(S): 100 INJECTION INTRAMUSCULAR at 08:16

## 2020-10-29 RX ADMIN — Medication 1 MILLIGRAM(S): at 20:35

## 2020-10-29 RX ADMIN — HALOPERIDOL DECANOATE 7 MILLIGRAM(S): 100 INJECTION INTRAMUSCULAR at 20:34

## 2020-10-29 NOTE — CHART NOTE - NSCHARTNOTEFT_GEN_A_CORE
Pt refusing lab work as allowed by court order. Loud and posturing ; potentially harmful to others. Manual hold applied for length of time of blood draw without incident

## 2020-10-29 NOTE — PROGRESS NOTE BEHAVIORAL HEALTH - NSBHFUPINTERVALHXFT_PSY_A_CORE
Patient seen and evaluated, chart reviewed. As per nursing report no acute events over night. On evaluation patient irritable, stating "I don't need any medication". Patient continues to have impaired concentration. Thoughts continue to be disorganized and illogical at times. Patient continues to present with paranoid delusions. Continues to present with poor insight and judgement. Denies depression and anxiety. Denies suicidal/homicidal ideations. Denies A/V hallucinations. Patient visible on the unit engaging with peers and attending groups.     Pt. has not been accepted back to the [facility].  An application for state placement in currently in progress as that is the only other viable treatment option.    #Handoff  -Haldol dec given 9/21  -Haldol dec given 10/19  -Duoneb, albuterol inhaler PRN for SOB Patient seen and evaluated, chart reviewed. As per nursing report no acute events over night. On evaluation patient irritable, stating "I don't need any medication". Patient continues to have impaired concentration. Thoughts continue to be disorganized and illogical at times. Patient continues to present with paranoid delusions. Continues to present with poor insight and judgement. Denies depression and anxiety. Denies suicidal/homicidal ideations. Denies A/V hallucinations. Patient visible on the unit engaging with peers and attending groups.     Pt. has not been accepted back to the [facility].  An application for state placement is currently in progress as that is the only other viable treatment option.    #Handoff  -Haldol dec given 9/21  -Haldol dec given 10/19  -Duoneb, albuterol inhaler PRN for SOB Patient seen and evaluated, chart reviewed. As per nursing report no acute events over night. On evaluation patient irritable, stating "I don't need any medication". Patient continues to have impaired concentration. Thoughts continue to be disorganized and illogical at times. Patient continues to present with paranoid delusions. Continues to present with poor insight and judgement. Denies depression and anxiety. Denies suicidal/homicidal ideations. Denies A/V hallucinations. Patient visible on the unit engaging with peers and attending groups.     Pt. has not been accepted back to the [facility].  An application for state placement is currently in progress as that is the only other viable treatment option.    This afternoon patient refusing lab work as allowed by court order. Loud and posturing ; potentially harmful to others. Manual hold applied for length of time of blood draw without incident.     #Handoff  -Haldol dec given 9/21  -Haldol dec given 10/19  -Duoneb, albuterol inhaler PRN for SOB

## 2020-10-29 NOTE — PROGRESS NOTE BEHAVIORAL HEALTH - SUMMARY
63 year old female , domiciled in nursing home, with history of COPD, DVT, bipolar, and dementia, multiple prior inpatient psych, was at [place] for 2 years, recently discharged from inpatient psych [facility] transferred to [facility], sent to the ER for continuous medication non-compliant for the past months, worsening psychosis and agitation.    Patient presents with decompensated psychiatric symptoms, secondary to medication non-compliance. Disorganized behavior noted and underlying psychosis appears to be impairing patient's judgment and behavior. Patient appears to be a danger to herself at this time due to patient unable to care for herself at this time. Patient would benefit form IPP for safety and stabilization.    Thoughts continue to be disorganized, illogical. Patient continues to present with paranoid delusions. Continues to present with poor insight and judgement. Denies depression and anxiety. Denies suicidal/homicidal ideations. Denies A/V hallucinations. Patient in good behavioral control. Visible on the unit engaging with peers.    #Admit 2PC (9:27)    Plan:    #Schizophrenia  -Haldol 5mg daily in am  -TOO in place Haldol IM 5mg daily if patient refuses PO   -Haldol 7mg bedtime  -TOO in place Haldol IM 7mg bedtime if patient refuses PO   -Haldol dec given 9/21/20  -Haldol dec given 10/19/20    #Anxiety/agitation  -Ativan 1mg bedtime    #Tobacco Use Disorder  -Nicotine Gum PRN    #COPD  -Duoneb PRN for SOB  -Albuterol inhaler PRN    -Haldol 2mg Q8 PRN for Severe agitation  -Lorazepam 1mg Q12 PRN for Physical aggression  -Hydroxyzine 25mg Q12 PRN for anxiety    -Tylenol PRN for pain  -Pantoprazole for GERD  -Maalox PRN for dyspepsia    #Handoff  -Haldol dec given 9/21  -Haldol dec given 10/19  -Duoneb, albuterol inhaler PRN for SOB 63 year old female , domiciled in nursing home, with history of COPD, DVT, bipolar, and dementia, multiple prior inpatient psych, was at [place] for 2 years, recently discharged from inpatient psych [facility] transferred to [facility], sent to the ER for continuous medication non-compliant for the past months, worsening psychosis and agitation.    Patient presents with decompensated psychiatric symptoms, secondary to medication non-compliance. Disorganized behavior noted and underlying psychosis appears to be impairing patient's judgment and behavior. Patient appears to be a danger to herself at this time due to patient unable to care for herself at this time. Patient would benefit form IPP for safety and stabilization.    Thoughts continue to be disorganized, illogical. Patient continues to present with paranoid delusions. Continues to present with poor insight and judgement. Denies depression and anxiety. Denies suicidal/homicidal ideations. Denies A/V hallucinations. Patient in good behavioral control. Visible on the unit engaging with peers.    #Admit 2PC (9:27)    Plan:    #Schizophrenia  -Haldol 5mg daily in am  -Haldol 7mg bedtime  -Haldol dec given 9/21/20  -Haldol dec given 10/19/20    #Anxiety/agitation  -Ativan 1mg bedtime    #Tobacco Use Disorder  -Nicotine Gum PRN    #COPD  -Duoneb PRN for SOB  -Albuterol inhaler PRN    -Haldol 2mg Q8 PRN for Severe agitation  -Lorazepam 1mg Q12 PRN for Physical aggression  -Hydroxyzine 25mg Q12 PRN for anxiety    -Tylenol PRN for pain  -Pantoprazole for GERD  -Maalox PRN for dyspepsia    #Handoff  -Haldol dec given 9/21  -Haldol dec given 10/19  -Duoneb, albuterol inhaler PRN for SOB

## 2020-10-30 PROCEDURE — 99231 SBSQ HOSP IP/OBS SF/LOW 25: CPT

## 2020-10-30 RX ORDER — HALOPERIDOL DECANOATE 100 MG/ML
7 INJECTION INTRAMUSCULAR AT BEDTIME
Refills: 0 | Status: DISCONTINUED | OUTPATIENT
Start: 2020-10-30 | End: 2020-10-30

## 2020-10-30 RX ORDER — HALOPERIDOL DECANOATE 100 MG/ML
5 INJECTION INTRAMUSCULAR
Refills: 0 | Status: DISCONTINUED | OUTPATIENT
Start: 2020-10-30 | End: 2020-11-23

## 2020-10-30 RX ADMIN — HALOPERIDOL DECANOATE 5 MILLIGRAM(S): 100 INJECTION INTRAMUSCULAR at 20:30

## 2020-10-30 RX ADMIN — Medication 1 MILLIGRAM(S): at 20:31

## 2020-10-30 RX ADMIN — HALOPERIDOL DECANOATE 5 MILLIGRAM(S): 100 INJECTION INTRAMUSCULAR at 08:27

## 2020-10-30 NOTE — CHART NOTE - NSCHARTNOTEFT_GEN_A_CORE
Ms. Frazier remains on the unit for continued treatment, safety and observation. The treatment team met with Ms. Frazier to review treatment plan, medications and discharge plan.  Patient remains disorganized, paranoid and delusional. She reports "I don't need medication". She continues to present with paranoid delusions. Insight and judgment remain poor. TOO remains in place.  She remains preoccupied with being discharged.  She does not otherwise engage in any meaningful dialogue. She denies depressive symptoms and showed no evidence of suicidal or homicidal ideation.     Ms. Frazier is often visible on the unit and attends groups sporadically. Redirection is often needed in group for patient to focus on the material present and not "talk out of turn".   has attempted to contact patients daughter to discuss discharge planning.    An application for state placement has been submitted.   attempted to contact patient's daughter, Carol Ann Frazier, however, her voicemail was full.  Outreach attempts will be continued.  Writer will also follow up with state placement referral.    Mental Status Exam:    Mood-  Irritable    Sleep-  Normal    Appetite-  Normal    ADL's-  Fair    Thought process-  Disorganized /Illogical/ Impaired Reasoning    Observation-  Routine    Ms. Frazier is not stable for discharge at this time.

## 2020-10-30 NOTE — PROGRESS NOTE BEHAVIORAL HEALTH - NSBHFUPINTERVALHXFT_PSY_A_CORE
Patient seen and evaluated, chart reviewed. As per nursing report no acute events over night. On evaluation patient irritable, stating "I don't need medication anymore". Patient continues to have impaired concentration. Thoughts continue to be disorganized and illogical at times. Patient continues to present with paranoid delusions. Continues to present with poor insight and judgement. Denies depression and anxiety. Denies suicidal/homicidal ideations. Denies A/V hallucinations. Patient visible on the unit engaging with peers and attending groups.     Patient has not been accepted back to the [facility].  An application for state placement is currently in progress as that is the only other viable treatment option.    #Handoff  -Haldol dec given 9/21  -Haldol dec given 10/19  -Duoneb, albuterol inhaler PRN for SOB Patient seen and evaluated, chart reviewed. As per nursing report no acute events over night. On evaluation patient irritable, stating "I don't need medication anymore". Patient continues to have impaired concentration. Thoughts continue to be disorganized and illogical at times. Patient continues to present with paranoid delusions. Continues to present with poor insight and judgement. Denies depression and anxiety. Denies suicidal/homicidal ideations. Denies A/V hallucinations. Discussed discharge. Patient made aware discharge plans in progress. Discussed starting to decrease Haldol PO. Patient in agreement. Patient now voluntary 9:13. Patient visible on the unit engaging with peers and attending groups.     Patient has not been accepted back to the [facility].  An application for state placement is currently in progress as that is the only other viable treatment option.    #Handoff  -Haldol dec given 9/21  -Haldol dec given 10/19  -Duoneb, albuterol inhaler PRN for SOB

## 2020-10-30 NOTE — PROGRESS NOTE BEHAVIORAL HEALTH - SUMMARY
63 year old female , domiciled in nursing home, with history of COPD, DVT, bipolar, and dementia, multiple prior inpatient psych, was at [place] for 2 years, recently discharged from inpatient psych [facility] transferred to [facility], sent to the ER for continuous medication non-compliant for the past months, worsening psychosis and agitation.    Patient presents with decompensated psychiatric symptoms, secondary to medication non-compliance. Disorganized behavior noted and underlying psychosis appears to be impairing patient's judgment and behavior. Patient appears to be a danger to herself at this time due to patient unable to care for herself at this time. Patient would benefit form IPP for safety and stabilization.    Thoughts continue to be disorganized, illogical. Patient continues to present with paranoid delusions. Continues to present with poor insight and judgement. Denies depression and anxiety. Denies suicidal/homicidal ideations. Denies A/V hallucinations. Patient in good behavioral control. Visible on the unit engaging with peers.    #Admit 2PC (9:27)    Plan:    #Schizophrenia  -Haldol 5mg daily in am  -TOO in place Haldol IM 5mg daily if patient refuses PO   -Haldol 7mg bedtime  -TOO in place Haldol IM 7mg bedtime if patient refuses PO   -Haldol dec given 9/21/20  -Haldol dec given 10/19/20    #Anxiety/agitation  -Ativan 1mg bedtime    #Tobacco Use Disorder  -Nicotine Gum PRN    #COPD  -Duoneb PRN for SOB  -Albuterol inhaler PRN    -Haldol 2mg Q8 PRN for Severe agitation  -Lorazepam 1mg Q12 PRN for Physical aggression  -Hydroxyzine 25mg Q12 PRN for anxiety    -Tylenol PRN for pain  -Pantoprazole for GERD  -Maalox PRN for dyspepsia    #Handoff  -Haldol dec given 9/21  -Haldol dec given 10/19  -Duoneb, albuterol inhaler PRN for SOB 63 year old female , domiciled in nursing home, with history of COPD, DVT, bipolar, and dementia, multiple prior inpatient psych, was at [place] for 2 years, recently discharged from inpatient psych [facility] transferred to [facility], sent to the ER for continuous medication non-compliant for the past months, worsening psychosis and agitation.    Patient presents with decompensated psychiatric symptoms, secondary to medication non-compliance. Disorganized behavior noted and underlying psychosis appears to be impairing patient's judgment and behavior. Patient appears to be a danger to herself at this time due to patient unable to care for herself at this time. Patient would benefit form IPP for safety and stabilization.    Thoughts continue to be disorganized, illogical. Patient continues to present with paranoid delusions. Continues to present with poor insight and judgement. Denies depression and anxiety. Denies suicidal/homicidal ideations. Denies A/V hallucinations. Patient in good behavioral control. Visible on the unit engaging with peers.    #Admit 2PC (9:27)    Plan:    #Schizophrenia  -Haldol 5mg daily  -Haldol 7mg bedtime  -Haldol dec given 9/21/20  -Haldol dec given 10/19/20    #Anxiety/agitation  -Ativan 1mg bedtime    #Tobacco Use Disorder  -Nicotine Gum PRN    #COPD  -Duoneb PRN for SOB  -Albuterol inhaler PRN    -Haldol 2mg Q8 PRN for Severe agitation  -Lorazepam 1mg Q12 PRN for Physical aggression  -Hydroxyzine 25mg Q12 PRN for anxiety    -Tylenol PRN for pain  -Pantoprazole for GERD  -Maalox PRN for dyspepsia    #Handoff  -Haldol dec given 9/21  -Haldol dec given 10/19  -Duoneb, albuterol inhaler PRN for SOB 63 year old female , domiciled in nursing home, with history of COPD, DVT, bipolar, and dementia, multiple prior inpatient psych, was at [place] for 2 years, recently discharged from inpatient psych [facility] transferred to [facility], sent to the ER for continuous medication non-compliant for the past months, worsening psychosis and agitation.    Patient presents with decompensated psychiatric symptoms, secondary to medication non-compliance. Disorganized behavior noted and underlying psychosis appears to be impairing patient's judgment and behavior. Patient appears to be a danger to herself at this time due to patient unable to care for herself at this time. Patient would benefit form IPP for safety and stabilization.    Thoughts continue to be disorganized, illogical. Patient continues to present with paranoid delusions. Continues to present with poor insight and judgement. Denies depression and anxiety. Denies suicidal/homicidal ideations. Denies A/V hallucinations. Patient in good behavioral control. Visible on the unit engaging with peers.    #Admit 2PC (9:27)    Plan:    #Schizophrenia  -Haldol 5mg BID  -Haldol dec given 9/21/20  -Haldol dec given 10/19/20    #Anxiety/agitation  -Ativan 1mg bedtime    #Tobacco Use Disorder  -Nicotine Gum PRN    #COPD  -Duoneb PRN for SOB  -Albuterol inhaler PRN    -Haldol 2mg Q8 PRN for Severe agitation  -Lorazepam 1mg Q12 PRN for Physical aggression  -Hydroxyzine 25mg Q12 PRN for anxiety    -Tylenol PRN for pain  -Pantoprazole for GERD  -Maalox PRN for dyspepsia    #Handoff  -Haldol dec given 9/21  -Haldol dec given 10/19  -Duoneb, albuterol inhaler PRN for SOB 63 year old female , domiciled in nursing home, with history of COPD, DVT, bipolar, and dementia, multiple prior inpatient psych, was at [place] for 2 years, recently discharged from inpatient psych [facility] transferred to [facility], sent to the ER for continuous medication non-compliant for the past months, worsening psychosis and agitation.    Patient presents with decompensated psychiatric symptoms, secondary to medication non-compliance. Disorganized behavior noted and underlying psychosis appears to be impairing patient's judgment and behavior. Patient appears to be a danger to herself at this time due to patient unable to care for herself at this time. Patient would benefit form IPP for safety and stabilization.    Thoughts continue to be disorganized, illogical. Patient continues to present with paranoid delusions. Continues to present with poor insight and judgement. Denies depression and anxiety. Denies suicidal/homicidal ideations. Denies A/V hallucinations. Patient in good behavioral control. Visible on the unit engaging with peers.    #Admit 2PC (9:27), Now Voluntary 9:13    Plan:    #Schizophrenia  -Haldol 5mg BID  -Haldol dec given 9/21/20  -Haldol dec given 10/19/20    #Anxiety/agitation  -Ativan 1mg bedtime    #Tobacco Use Disorder  -Nicotine Gum PRN    #COPD  -Duoneb PRN for SOB  -Albuterol inhaler PRN    -Haldol 2mg Q8 PRN for Severe agitation  -Lorazepam 1mg Q12 PRN for Physical aggression  -Hydroxyzine 25mg Q12 PRN for anxiety    -Tylenol PRN for pain  -Pantoprazole for GERD  -Maalox PRN for dyspepsia    #Handoff  -Haldol dec given 9/21  -Haldol dec given 10/19  -Duoneb, albuterol inhaler PRN for SOB 63 year old female , domiciled in nursing home, with history of COPD, DVT, bipolar, and dementia, multiple prior inpatient psych, was at [place] for 2 years, recently discharged from inpatient psych [facility] transferred to [facility], sent to the ER for continuous medication non-compliant for the past months, worsening psychosis and agitation.    Patient presents with decompensated psychiatric symptoms, secondary to medication non-compliance. Disorganized behavior noted and underlying psychosis appears to be impairing patient's judgment and behavior. Patient appears to be a danger to herself at this time due to patient unable to care for herself at this time. Patient would benefit form IPP for safety and stabilization.    Thoughts continue to be disorganized, illogical. Patient continues to present with paranoid delusions. Continues to present with poor insight and judgement. Denies depression and anxiety. Denies suicidal/homicidal ideations. Denies A/V hallucinations. Patient in good behavioral control. Visible on the unit engaging with peers.    #Admit 2PC (9:27), Now Voluntary 9:13    Plan:    #Schizophrenia  -Decrease haldol from 5mg daily and 7mg bedtime to Haldol 5mg BID  -Haldol dec given 9/21/20  -Haldol dec given 10/19/20    #Anxiety/agitation  -Ativan 1mg bedtime    #Tobacco Use Disorder  -Nicotine Gum PRN    #COPD  -Duoneb PRN for SOB  -Albuterol inhaler PRN    -Haldol 2mg Q8 PRN for Severe agitation  -Lorazepam 1mg Q12 PRN for Physical aggression  -Hydroxyzine 25mg Q12 PRN for anxiety    -Tylenol PRN for pain  -Pantoprazole for GERD  -Maalox PRN for dyspepsia    #Handoff  -Haldol dec given 9/21  -Haldol dec given 10/19  -Duoneb, albuterol inhaler PRN for SOB

## 2020-10-31 NOTE — PROGRESS NOTE BEHAVIORAL HEALTH - NSBHCHARTREVIEWVS_PSY_A_CORE FT
Vital Signs Last 24 Hrs  T(C): --  T(F): --  HR: 91 (31 Oct 2020 06:08) (91 - 91)  BP: 146/69 (31 Oct 2020 06:08) (146/69 - 146/69)  BP(mean): --  RR: 18 (31 Oct 2020 06:08) (18 - 18)  SpO2: --

## 2020-10-31 NOTE — PROGRESS NOTE BEHAVIORAL HEALTH - NSBHFUPINTERVALHXFT_PSY_A_CORE
Patient seen and evaluated, chart reviewed. As per nursing report no acute events over night. On evaluation patient irritable, stating "I don't need medication anymore". Patient continues to have impaired concentration. Thoughts continue to be disorganized and illogical at times. Patient continues to present with paranoid delusions. Continues to present with poor insight and judgement. Denies depression and anxiety. Denies suicidal/homicidal ideations. Denies A/V hallucinations. Discussed discharge. Patient made aware discharge plans in progress. Discussed starting to decrease Haldol PO. Patient in agreement. Patient now voluntary 9:13. Patient visible on the unit engaging with peers and attending groups.     Patient has not been accepted back to the [facility].  An application for state placement is currently in progress as that is the only other viable treatment option.    #Handoff  -Haldol dec given 9/21  -Haldol dec given 10/19  -Duoneb, albuterol inhaler PRN for SOB

## 2020-10-31 NOTE — PROGRESS NOTE BEHAVIORAL HEALTH - SUMMARY
63 year old female , domiciled in nursing home, with history of COPD, DVT, bipolar, and dementia, multiple prior inpatient psych, was at [place] for 2 years, recently discharged from inpatient psych [facility] transferred to [facility], sent to the ER for continuous medication non-compliant for the past months, worsening psychosis and agitation.    Patient presents with decompensated psychiatric symptoms, secondary to medication non-compliance. Disorganized behavior noted and underlying psychosis appears to be impairing patient's judgment and behavior. Patient appears to be a danger to herself at this time due to patient unable to care for herself at this time. Patient would benefit form IPP for safety and stabilization.    Thoughts continue to be disorganized, illogical. Patient continues to present with paranoid delusions. Continues to present with poor insight and judgement. Denies depression and anxiety. Denies suicidal/homicidal ideations. Denies A/V hallucinations. Patient in good behavioral control. Visible on the unit engaging with peers.    #Admit 2PC (9:27), Now Voluntary 9:13    Plan:    #Schizophrenia  -Decrease haldol from 5mg daily and 7mg bedtime to Haldol 5mg BID  -Haldol dec given 9/21/20  -Haldol dec given 10/19/20    #Anxiety/agitation  -Ativan 1mg bedtime    #Tobacco Use Disorder  -Nicotine Gum PRN    #COPD  -Duoneb PRN for SOB  -Albuterol inhaler PRN    -Haldol 2mg Q8 PRN for Severe agitation  -Lorazepam 1mg Q12 PRN for Physical aggression  -Hydroxyzine 25mg Q12 PRN for anxiety    -Tylenol PRN for pain  -Pantoprazole for GERD  -Maalox PRN for dyspepsia    #Handoff  -Haldol dec given 9/21  -Haldol dec given 10/19  -Duoneb, albuterol inhaler PRN for SOB

## 2020-11-01 LAB
GAMMA INTERFERON BACKGROUND BLD IA-ACNC: 0.08 IU/ML — SIGNIFICANT CHANGE UP
M TB IFN-G BLD-IMP: NEGATIVE — SIGNIFICANT CHANGE UP
M TB IFN-G CD4+ BCKGRND COR BLD-ACNC: -0.03 IU/ML — SIGNIFICANT CHANGE UP
M TB IFN-G CD4+CD8+ BCKGRND COR BLD-ACNC: -0.03 IU/ML — SIGNIFICANT CHANGE UP
QUANT TB PLUS MITOGEN MINUS NIL: 4.95 IU/ML — SIGNIFICANT CHANGE UP

## 2020-11-01 RX ORDER — HALOPERIDOL DECANOATE 100 MG/ML
5 INJECTION INTRAMUSCULAR ONCE
Refills: 0 | Status: COMPLETED | OUTPATIENT
Start: 2020-11-01 | End: 2020-11-01

## 2020-11-01 RX ADMIN — HALOPERIDOL DECANOATE 5 MILLIGRAM(S): 100 INJECTION INTRAMUSCULAR at 21:50

## 2020-11-01 RX ADMIN — Medication 1 MILLIGRAM(S): at 21:51

## 2020-11-01 RX ADMIN — HALOPERIDOL DECANOATE 5 MILLIGRAM(S): 100 INJECTION INTRAMUSCULAR at 09:59

## 2020-11-01 RX ADMIN — Medication 1 MILLIGRAM(S): at 09:59

## 2020-11-01 NOTE — CHART NOTE - NSCHARTNOTEFT_GEN_A_CORE
Pt wanted to go out for fresh air. she was told she could not go. she became agitated , aggressive toward staff. punching kicking and threw coffee. she is delusional and paranoid. trump is getting her all money. thoughts are disorganized and illogical. Her insight judgement are poor. mood angry hostile irritable. Denies s/h ideations. pt has been agitated psychotic disruptive and aggressive towards staff. she kicked ane threw coffee on staff. she can not be redirected meds reviewed. pt has been refuses antipsychotic mediations. pt refused po PRN medications.   plan haldol 5 mg and ativan 1 mg IM stat. provide supportive environment.

## 2020-11-02 PROCEDURE — 99231 SBSQ HOSP IP/OBS SF/LOW 25: CPT

## 2020-11-02 RX ORDER — ACETAMINOPHEN 500 MG
650 TABLET ORAL EVERY 4 HOURS
Refills: 0 | Status: DISCONTINUED | OUTPATIENT
Start: 2020-11-02 | End: 2020-12-03

## 2020-11-02 RX ORDER — NICOTINE POLACRILEX 2 MG
2 GUM BUCCAL
Refills: 0 | Status: DISCONTINUED | OUTPATIENT
Start: 2020-11-02 | End: 2020-12-03

## 2020-11-02 RX ORDER — PANTOPRAZOLE SODIUM 20 MG/1
40 TABLET, DELAYED RELEASE ORAL
Refills: 0 | Status: DISCONTINUED | OUTPATIENT
Start: 2020-11-03 | End: 2020-12-03

## 2020-11-02 RX ADMIN — HALOPERIDOL DECANOATE 5 MILLIGRAM(S): 100 INJECTION INTRAMUSCULAR at 22:58

## 2020-11-02 NOTE — PROGRESS NOTE BEHAVIORAL HEALTH - NSBHFUPINTERVALHXFT_PSY_A_CORE
Patient seen and evaluated, chart reviewed. As per nursing report patient agitated psychotic disruptive and physically aggressive towards staff this weekend. Not verbally re-directable. Required haldol 5 mg and ativan 1 mg IM stat. On evaluation patient irritable. Refusing meds. Patient continues to have impaired concentration. Thoughts continue to be disorganized and illogical at times. Patient continues to present with paranoid delusions. Continues to present with poor insight and judgement. Denies depression and anxiety. Denies suicidal/homicidal ideations. Denies A/V hallucinations. Patient visible on the unit engaging with peers.    Patient has not been accepted back to the [facility].  An application for state placement is currently in progress as that is the only other viable treatment option.    #Handoff  -Haldol dec given 9/21  -Haldol dec given 10/19  -Duoneb, albuterol inhaler PRN for SOB

## 2020-11-02 NOTE — PROGRESS NOTE BEHAVIORAL HEALTH - SUMMARY
63 year old female , domiciled in nursing home, with history of COPD, DVT, bipolar, and dementia, multiple prior inpatient psych, was at [place] for 2 years, recently discharged from inpatient psych [facility] transferred to [facility], sent to the ER for continuous medication non-compliant for the past months, worsening psychosis and agitation.    Patient presents with decompensated psychiatric symptoms, secondary to medication non-compliance. Disorganized behavior noted and underlying psychosis appears to be impairing patient's judgment and behavior. Patient appears to be a danger to herself at this time due to patient unable to care for herself at this time. Patient would benefit form IPP for safety and stabilization.    Thoughts continue to be disorganized, illogical. Patient continues to present with paranoid delusions. Continues to present with poor insight and judgement. Denies depression and anxiety. Denies suicidal/homicidal ideations. Denies A/V hallucinations. Visible on the unit engaging with peers.    #Admit 2PC (9:27), Now Voluntary 9:13    Plan:    #Schizophrenia  -Haldol 5mg BID  -Haldol dec given 9/21/20  -Haldol dec given 10/19/20    #Anxiety/agitation  -Ativan 1mg bedtime    #Tobacco Use Disorder  -Nicotine Gum PRN    #COPD  -Duoneb PRN for SOB  -Albuterol inhaler PRN    -Haldol 2mg Q8 PRN for Severe agitation  -Lorazepam 1mg Q12 PRN for Physical aggression  -Hydroxyzine 25mg Q12 PRN for anxiety    -Tylenol PRN for pain  -Pantoprazole for GERD  -Maalox PRN for dyspepsia    #Handoff  -Haldol dec given 9/21  -Haldol dec given 10/19  -Duoneb, albuterol inhaler PRN for SOB

## 2020-11-02 NOTE — CHART NOTE - NSCHARTNOTEFT_GEN_A_CORE
The treatment team met with pt. to review treatment plan, medication and discharge plans.  As per staff report, pt. was verbally physically aggressive over the weekend and required IM PRN's.  Pt. presents as psychotic, delusional and disorganized. She is also refusing her medication.  TOO remains in place.  She does not engage in any meaningful dialogue and appears preoccupied with discharge.  Pt. is frequently present on the unit presenting a disheveled with poor ADL's.  She denies any suicidal or homicidal ideaiton or any A/V hallucinations at this time.    An application has been submitted for state placement.  Writer is submitting additional documents as per their request and will continue to follow up.  Pt. was not accepted back to the [facility].    Mental Status Exam    Mood-  Irritable    Sleep-  Normal    Appetite-  Normal    ADL's-  Fair    Thought Process-  Psychotic/delusional/disorganized/illogical    Observation- Routine    Pt. is not psychiatrically stable for discharge at this time.

## 2020-11-03 PROCEDURE — 99231 SBSQ HOSP IP/OBS SF/LOW 25: CPT

## 2020-11-03 NOTE — PROGRESS NOTE BEHAVIORAL HEALTH - SUMMARY
63 year old female , domiciled in nursing home, with history of COPD, DVT, bipolar, and dementia, multiple prior inpatient psych, was at [place] for 2 years, recently discharged from inpatient psych [facility] transferred to [facility], sent to the ER for continuous medication non-compliant for the past months, worsening psychosis and agitation.    Patient presents with decompensated psychiatric symptoms, secondary to medication non-compliance. Disorganized behavior noted and underlying psychosis appears to be impairing patient's judgment and behavior. Patient appears to be a danger to herself at this time due to patient unable to care for herself at this time. Patient would benefit form IPP for safety and stabilization.    Thoughts continue to be disorganized, illogical. Patient continues to present with paranoid delusions. Continues to present with poor insight and judgement. Denies depression and anxiety. Denies suicidal/homicidal ideations. Denies A/V hallucinations. Visible on the unit engaging with peers.    #Admit 2PC (9:27), Now Voluntary 9:13    Plan:    #Schizophrenia  -Haldol 5mg BID  -Haldol dec given 9/21/20  -Haldol dec given 10/19/20    #Anxiety/agitation  -Ativan 1mg bedtime    #Tobacco Use Disorder  -Nicotine Gum PRN    #COPD  -Duoneb PRN for SOB  -Albuterol inhaler PRN    -Haldol 2mg Q8 PRN for Severe agitation  -Lorazepam 1mg Q12 PRN for Physical aggression  -Hydroxyzine 25mg Q12 PRN for anxiety    -Tylenol PRN for pain  -Pantoprazole for GERD  -Maalox PRN for dyspepsia    #Handoff  -Haldol dec given 9/21  -Haldol dec given 10/19  -Duoneb, albuterol inhaler PRN for SOB 63 year old female , domiciled in nursing home, with history of COPD, DVT, bipolar, and dementia, multiple prior inpatient psych, was at [place] for 2 years, recently discharged from inpatient psych [facility] transferred to [facility], sent to the ER for continuous medication non-compliant for the past months, worsening psychosis and agitation.    Patient presents with decompensated psychiatric symptoms, secondary to medication non-compliance. Disorganized behavior noted and underlying psychosis appears to be impairing patient's judgment and behavior. Patient appears to be a danger to herself at this time due to patient unable to care for herself at this time. Patient would benefit form IPP for safety and stabilization.    Thoughts continue to be disorganized, illogical. Patient continues to present with paranoid delusions. Continues to present with poor insight and judgement. Denies depression and anxiety. Denies suicidal/homicidal ideations. Denies A/V hallucinations. Visible on the unit engaging with peers.    #Admit 2PC (9:27)    Plan:    #Schizophrenia  -Haldol 5mg BID  -Haldol dec given 9/21/20  -Haldol dec given 10/19/20    #Anxiety/agitation  -Ativan 1mg bedtime    #Tobacco Use Disorder  -Nicotine Gum PRN    #COPD  -Duoneb PRN for SOB  -Albuterol inhaler PRN    -Haldol 2mg Q8 PRN for Severe agitation  -Lorazepam 1mg Q12 PRN for Physical aggression  -Hydroxyzine 25mg Q12 PRN for anxiety    -Tylenol PRN for pain  -Pantoprazole for GERD  -Maalox PRN for dyspepsia    #Handoff  -Haldol dec given 9/21  -Haldol dec given 10/19  -Duoneb, albuterol inhaler PRN for SOB

## 2020-11-03 NOTE — PROGRESS NOTE BEHAVIORAL HEALTH - NSBHFUPINTERVALHXFT_PSY_A_CORE
Patient seen and evaluated, chart reviewed. As per nursing report patient refusing meds and vitals. On evaluation patient irritable. Refusing meds. Patient continues to have impaired concentration. Thoughts continue to be disorganized and illogical at times. Patient continues to present with paranoid delusions. Continues to present with poor insight and judgement. Denies depression and anxiety. Denies suicidal/homicidal ideations. Denies A/V hallucinations. Patient visible on the unit engaging with peers.    Patient has not been accepted back to the [facility].  An application for state placement is currently in progress as that is the only other viable treatment option.    #Handoff  -Haldol dec given 9/21  -Haldol dec given 10/19  -Duoneb, albuterol inhaler PRN for SOB

## 2020-11-04 PROCEDURE — 99231 SBSQ HOSP IP/OBS SF/LOW 25: CPT

## 2020-11-04 RX ADMIN — Medication 1 MILLIGRAM(S): at 21:32

## 2020-11-04 RX ADMIN — Medication 1 MILLIGRAM(S): at 02:41

## 2020-11-04 NOTE — CHART NOTE - NSCHARTNOTEFT_GEN_A_CORE
The treatment team met with pt. to review treatment plan, medications and discharge plan.  Pt. continues to present as disorganized, illogical and with paranoid delusions. She has been refusing her medication and vital.  TOO remains in place.  She does not engage in any meaningful dialogue and makes several non-sensical statements.  Her ADL's are fair/poor.  She is frequently present on the unit and there have been no recent behavioral issues.  She denies any suicidal or homicidal ideation or any A/V hallucinations.    A referral has been made for state placement.  Writer is continuing to communicate with [facility] to facilitate pt.'s transfer.    Mental Status Exam:    Mood-  Labile    Sleep-  Normal    Appetite-  Normal    ADL;s-  Fair/Poor    Thought Process- Disorganzied/Illogical/Paranoid Delusions    Observation-  Routine    Pt. is not psychiatrically stable for discharge at this time.

## 2020-11-04 NOTE — PROGRESS NOTE BEHAVIORAL HEALTH - NSBHFUPINTERVALHXFT_PSY_A_CORE
Patient seen and evaluated, chart reviewed. As per nursing report PRN ativan given for anxiety yesterday. Patient refusing all standing meds and vitals. On evaluation patient irritable. Refusing meds this morning. Patient continues to have impaired concentration. Thoughts continue to be disorganized and illogical at times. Patient continues to present with paranoid delusions. Continues to present with poor insight and judgement. Denies depression and anxiety. Denies suicidal/homicidal ideations. Denies A/V hallucinations. Patient visible on the unit engaging with peers.    Patient has not been accepted back to the [facility].  An application for state placement is currently in progress as that is the only other viable treatment option.    #Handoff  -Haldol dec given 9/21  -Haldol dec given 10/19  -Duoneb, albuterol inhaler PRN for SOB

## 2020-11-04 NOTE — PROGRESS NOTE BEHAVIORAL HEALTH - SUMMARY
63 year old female , domiciled in nursing home, with history of COPD, DVT, bipolar, and dementia, multiple prior inpatient psych, was at [place] for 2 years, recently discharged from inpatient psych [facility] transferred to [facility], sent to the ER for continuous medication non-compliant for the past months, worsening psychosis and agitation.    Patient presents with decompensated psychiatric symptoms, secondary to medication non-compliance. Disorganized behavior noted and underlying psychosis appears to be impairing patient's judgment and behavior. Patient appears to be a danger to herself at this time due to patient unable to care for herself at this time. Patient would benefit form IPP for safety and stabilization.    Thoughts continue to be disorganized, illogical. Patient continues to present with paranoid delusions. Continues to present with poor insight and judgement. Denies depression and anxiety. Denies suicidal/homicidal ideations. Denies A/V hallucinations. Visible on the unit engaging with peers.    #Admit 2PC (9:27), Now Voluntary 9:13    Plan:    #Schizophrenia  -Haldol 5mg BID  -Haldol dec given 9/21/20  -Haldol dec given 10/19/20    #Anxiety/agitation  -Ativan 1mg bedtime    #Tobacco Use Disorder  -Nicotine Gum PRN    #COPD  -Duoneb PRN for SOB  -Albuterol inhaler PRN    -Haldol 2mg Q8 PRN for Severe agitation  -Lorazepam 1mg Q12 PRN for Physical aggression  -Hydroxyzine 25mg Q12 PRN for anxiety    -Tylenol PRN for pain  -Pantoprazole for GERD  -Maalox PRN for dyspepsia    #Handoff  -Haldol dec given 9/21  -Haldol dec given 10/19  -Duoneb, albuterol inhaler PRN for SOB 63 year old female , domiciled in nursing home, with history of COPD, DVT, bipolar, and dementia, multiple prior inpatient psych, was at [facility] for 2 years, recently discharged from inpatient psych [facility] transferred to [facility], sent to the ER for continuous medication non-compliant for the past months, worsening psychosis and agitation.    Patient presents with decompensated psychiatric symptoms, secondary to medication non-compliance. Disorganized behavior noted and underlying psychosis appears to be impairing patient's judgment and behavior. Patient appears to be a danger to herself at this time due to patient unable to care for herself at this time. Patient would benefit form IPP for safety and stabilization.    Thoughts continue to be disorganized, illogical. Patient continues to present with paranoid delusions. Continues to present with poor insight and judgement. Denies depression and anxiety. Denies suicidal/homicidal ideations. Denies A/V hallucinations. Visible on the unit engaging with peers.    #Admit 2PC (9:27)    Plan:    #Schizophrenia  -Haldol 5mg BID  -Haldol dec given 9/21/20  -Haldol dec given 10/19/20    #Anxiety/agitation  -Ativan 1mg bedtime    #Tobacco Use Disorder  -Nicotine Gum PRN    #COPD  -Duoneb PRN for SOB  -Albuterol inhaler PRN    -Haldol 2mg Q8 PRN for Severe agitation  -Lorazepam 1mg Q12 PRN for Physical aggression  -Hydroxyzine 25mg Q12 PRN for anxiety    -Tylenol PRN for pain  -Pantoprazole for GERD  -Maalox PRN for dyspepsia    #Handoff  -Haldol dec given 9/21  -Haldol dec given 10/19  -Duoneb, albuterol inhaler PRN for SOB

## 2020-11-05 PROCEDURE — 99231 SBSQ HOSP IP/OBS SF/LOW 25: CPT

## 2020-11-05 RX ADMIN — Medication 1 MILLIGRAM(S): at 10:09

## 2020-11-05 RX ADMIN — Medication 1 MILLIGRAM(S): at 22:43

## 2020-11-05 NOTE — PROGRESS NOTE BEHAVIORAL HEALTH - SUMMARY
63 year old female , domiciled in nursing home, with history of COPD, DVT, bipolar, and dementia, multiple prior inpatient psych, was at [place] for 2 years, recently discharged from inpatient psych [facility] transferred to [facility], sent to the ER for continuous medication non-compliant for the past months, worsening psychosis and agitation.    Patient presents with decompensated psychiatric symptoms, secondary to medication non-compliance. Disorganized behavior noted and underlying psychosis appears to be impairing patient's judgment and behavior. Patient appears to be a danger to herself at this time due to patient unable to care for herself at this time. Patient would benefit form IPP for safety and stabilization.    Thoughts continue to be disorganized, illogical. Patient continues to present with paranoid delusions. Continues to present with poor insight and judgement. Denies depression and anxiety. Denies suicidal/homicidal ideations. Denies A/V hallucinations. Visible on the unit engaging with peers.    #Admit 2PC (9:27)    Plan:    #Schizophrenia  -Haldol 5mg BID  -Haldol dec given 9/21/20  -Haldol dec given 10/19/20    #Anxiety/agitation  -Ativan 1mg bedtime    #Tobacco Use Disorder  -Nicotine Gum PRN    #COPD  -Duoneb PRN for SOB  -Albuterol inhaler PRN    -Haldol 2mg Q8 PRN for Severe agitation  -Lorazepam 1mg Q12 PRN for Physical aggression  -Hydroxyzine 25mg Q12 PRN for anxiety    -Tylenol PRN for pain  -Pantoprazole for GERD  -Maalox PRN for dyspepsia    #Handoff  -Haldol dec given 9/21  -Haldol dec given 10/19  -Duoneb, albuterol inhaler PRN for SOB

## 2020-11-05 NOTE — PROGRESS NOTE BEHAVIORAL HEALTH - NSBHFUPINTERVALHXFT_PSY_A_CORE
Patient seen and evaluated, chart reviewed. As per nursing report PRN ativan given for anxiety with good effect. Patient refusing all standing meds. On evaluation patient calm, stating "Im ready to leave, my  is picking me up" Patient continues to have impaired concentration. Thoughts continue to be disorganized and illogical at times. Patient continues to present with paranoid delusions. Continues to present with poor insight and judgement. Denies depression and anxiety. Denies suicidal/homicidal ideations. Denies A/V hallucinations. Patient visible on the unit engaging with peers.    Patient has not been accepted back to the [facility].  An application for state placement is currently in progress as that is the only other viable treatment option.    #Handoff  -Haldol dec given 9/21  -Haldol dec given 10/19  -Duoneb, albuterol inhaler PRN for SOB

## 2020-11-05 NOTE — PROGRESS NOTE BEHAVIORAL HEALTH - NSBHCHARTREVIEWVS_PSY_A_CORE FT
Vital Signs Last 24 Hrs  T(C): --  T(F): --  HR: 95 (04 Nov 2020 15:50) (95 - 95)  BP: 125/73 (04 Nov 2020 15:50) (125/73 - 125/73)  BP(mean): --  RR: 18 (04 Nov 2020 15:50) (18 - 18)  SpO2: --

## 2020-11-06 PROCEDURE — 99231 SBSQ HOSP IP/OBS SF/LOW 25: CPT

## 2020-11-06 RX ADMIN — Medication 1 MILLIGRAM(S): at 10:15

## 2020-11-06 RX ADMIN — Medication 1 MILLIGRAM(S): at 20:31

## 2020-11-06 NOTE — PROGRESS NOTE BEHAVIORAL HEALTH - NSBHFUPINTERVALHXFT_PSY_A_CORE
Patient seen and evaluated, chart reviewed. As per nursing report PRN ativan given for anxiety with good effect. Patient refusing all standing meds. On evaluation patient calm, stating "My husbands not coming to get me, he needs psychiatric help" Then rambled on about Trump. Patient continues to have impaired concentration. Thoughts continue to be disorganized and illogical at times. Patient continues to present with paranoid delusions. Continues to present with poor insight and judgement. Denies depression and anxiety. Denies suicidal/homicidal ideations. Denies A/V hallucinations. Patient visible on the unit engaging with peers.    Patient has not been accepted back to the [facility].  An application for state placement is currently in progress as that is the only other viable treatment option.    #Handoff  -Haldol dec given 9/21  -Haldol dec given 10/19  -Duoneb, albuterol inhaler PRN for SOB

## 2020-11-06 NOTE — PROGRESS NOTE BEHAVIORAL HEALTH - SUMMARY
63 year old female , domiciled in nursing home, with history of COPD, DVT, bipolar, and dementia, multiple prior inpatient psych, was at [place] for 2 years, recently discharged from inpatient psych [facility] transferred to [facility], sent to the ER for continuous medication non-compliant for the past months, worsening psychosis and agitation.    Patient presents with decompensated psychiatric symptoms, secondary to medication non-compliance. Disorganized behavior noted and underlying psychosis appears to be impairing patient's judgment and behavior. Patient appears to be a danger to herself at this time due to patient unable to care for herself at this time. Patient would benefit form IPP for safety and stabilization.    Thoughts continue to be disorganized, illogical. Patient continues to present with paranoid delusions. Continues to present with poor insight and judgement. Denies depression and anxiety. Denies suicidal/homicidal ideations. Denies A/V hallucinations. Visible on the unit engaging with peers.    #Admit 2PC (9:27)    Plan:    #Schizophrenia  -Haldol 5mg BID  -Haldol dec given 9/21/20  -Haldol dec given 10/19/20    #Anxiety/agitation  -Ativan 1mg bedtime    #Tobacco Use Disorder  -Nicotine Gum PRN    #COPD  -Duoneb PRN for SOB  -Albuterol inhaler PRN    -Haldol 2mg Q8 PRN for Severe agitation  -Lorazepam 1mg Q12 PRN for Physical aggression  -Hydroxyzine 25mg Q12 PRN for anxiety    -Tylenol PRN for pain  -Pantoprazole for GERD  -Maalox PRN for dyspepsia    #Handoff  -Haldol dec given 9/21  -Haldol dec given 10/19  -Duoneb, albuterol inhaler PRN for SOB

## 2020-11-06 NOTE — CHART NOTE - NSCHARTNOTEFT_GEN_A_CORE
The treatment team met with pt. to review treatment plan, medications and discharge plan.  There is no change in pt.'s mental status at this time.  Pt. is presenting as irritable, disorganized and with paranoid delusions.  She remains preoccupied with being discharged. She becomes increasingly agitated when it is explained she is not yet ready for discharge.  Pt. also continues to remain an elopement risk.  She refuses to take oral medications and TOO remains in place.  Pt. denies any suicidal or homicidal ideations or any A/V hallucinations.      On this date, writer spoke with pt.s daughter via email to inform her that pt. is being referred for state placement.  It was explained there are no other discharge options at this time.  Ms. Frazier questioned why pt. is not improving with medication but did not verbalize dissent to state placement when she was informed there were no other discharge options. The NP, Eric Dunn, was included and participated in the email.    Mental Status Exam:    Mood-  Irritable    Sleep-  Normal    Appetite-  Normal    ADL's-  Fair    Thought Process-  Disorganized/Illogical/Delusional    Observation-  Routine    Pt. is not stable for discharge at this time.

## 2020-11-07 RX ADMIN — Medication 1 MILLIGRAM(S): at 20:57

## 2020-11-07 RX ADMIN — Medication 1 MILLIGRAM(S): at 08:38

## 2020-11-09 PROCEDURE — 99231 SBSQ HOSP IP/OBS SF/LOW 25: CPT

## 2020-11-09 NOTE — DISCHARGE NOTE BEHAVIORAL HEALTH - USE THE 5 A'S (ASK, ADVISE, ASSESS, ASSIST, ARRANGE)
Pt instructed to increase the Omeprazole to daily. If helpful- take for 3 mos, then reduce to prn again. If not helpful, consider eval by Dr. García Machuca.   Statement Selected

## 2020-11-09 NOTE — DISCHARGE NOTE BEHAVIORAL HEALTH - NSBHDCSWCOMMENTSFT_PSY_A_CORE
discharge summary sent electronically [place] [facility] 12-3-2020 9:30am

## 2020-11-09 NOTE — DISCHARGE NOTE BEHAVIORAL HEALTH - NSBHDCMEDSFT_PSY_A_CORE
Haldol PO and BLANDON given 9/21/20 and 10/19/20. Patient tolerated the medication well Haldol PO and BLANDON given 9/21/20 and 10/19/20. Next dose due 11/16/20. Patient tolerated the medication well Haldol PO and BLANDON given 9/21/20, 10/19/20, 11/16/20. Next dose due 12/14/20. Ativan 1mg PRN. Patient tolerated the medication well

## 2020-11-09 NOTE — CHART NOTE - NSCHARTNOTEFT_GEN_A_CORE
The treatment team met with pt. to review treatment plan, medications and discharge plan.  Pt. continues to present as disorganized, illogical and with paranoid delusions.  She is currently refusing standing medications.  Pt. presents with poor ADL's and is refusing to shower even with encouragement.  There have been no significant behavioral issues reported over the weekend.  Pt. did receive PRN's for anxiety.  She denies any suicidal or homicidal ideations or any A/V hallucinations.      Pt. has been referred for state placement.  Her application is still be reviewed by [facility] and pt. has reportedly been cleared psychiatrically.  She is currently waiting on medical clearance and a bed transfer date.  Writer will continue to follow up.  Pt.'s daughter, Carol Ann Frazier, has been made aware of the referral for state placement.  She has not dissented.     Mental Status Exam:    Mood-  Irritable    Sleep-  Normal    Appetite-  Normal    ADL's-  Fair/Poor    Thought Process-  Disorganized/Illogical/Delusions    Observation-  Routine    Pt. is not psychiatrically stable for discharge at this time.

## 2020-11-09 NOTE — PROGRESS NOTE BEHAVIORAL HEALTH - NSBHFUPINTERVALHXFT_PSY_A_CORE
Patient seen and evaluated, chart reviewed. As per nursing report PRN ativan given for anxiety over the weekend with good effect. Patient refusing all standing meds. On evaluation patient calm and cooperative. Patient continues to have impaired concentration. Thoughts continue to be disorganized and illogical at times. Patient continues to present with paranoid delusions. Continues to present with poor insight and judgement. Denies depression and anxiety. Denies suicidal/homicidal ideations. Denies A/V hallucinations. Patient visible on the unit engaging with peers.    Patient has not been accepted back to the [facility].  An application for state placement is currently in progress as that is the only other viable treatment option.    #Handoff  -Haldol dec given 9/21  -Haldol dec given 10/19  -Duoneb, albuterol inhaler PRN for SOB Patient seen and evaluated, chart reviewed. As per nursing report PRN ativan given for anxiety over the weekend with good effect. Patient refusing all standing meds. On evaluation patient calm and cooperative. Patient continues to have impaired concentration. Thoughts continue to be disorganized and illogical at times. Patient continues to present with paranoid delusions. Continues to present with poor insight and judgement. Denies depression and anxiety. Denies suicidal/homicidal ideations. Denies A/V hallucinations. Patient visible on the unit engaging with peers.    Patient has not been accepted back to the [facility].  An application for state placement is currently in progress as that is the only other viable treatment option. Covid test done today.    #Handoff  -Haldol dec given 9/21  -Haldol dec given 10/19  -Duoneb, albuterol inhaler PRN for SOB

## 2020-11-09 NOTE — DISCHARGE NOTE BEHAVIORAL HEALTH - NSBHPSYCHMEDOTHERFT_PSY_A_CORE
Patient prescribed Haldol PO and Haldol BLANDON 9/21/20 and 10/19/20 Patient prescribed Haldol PO and Haldol BLANDON 9/21/20 and 10/19/20. Next dose due 11/16/20 Patient prescribed Haldol PO and Haldol BLANDON given 9/21/20, 10/19/20, 11/16/20. Next dose due 12/14/20.

## 2020-11-09 NOTE — CHART NOTE - NSCHARTNOTEFT_GEN_A_CORE
Reviewed and referred to chart notes and evaluated patient.    I agree with treatment team that pt needs to be treated over objection for clinical improvement.

## 2020-11-09 NOTE — DISCHARGE NOTE BEHAVIORAL HEALTH - NSBHDCSECLUSIONFT_PSY_A_CORE
Multiple IM injections and manual holds Multiple IM injections and manual holds. TOO granted to ensure medication compliance.

## 2020-11-09 NOTE — PROGRESS NOTE BEHAVIORAL HEALTH - SUMMARY
63 year old female , domiciled in nursing home, with history of COPD, DVT, bipolar, and dementia, multiple prior inpatient psych, was at [place] for 2 years, recently discharged from inpatient psych [facility] transferred to [facility], sent to the ER for continuous medication non-compliant for the past months, worsening psychosis and agitation.    Patient presents with decompensated psychiatric symptoms, secondary to medication non-compliance. Disorganized behavior noted and underlying psychosis appears to be impairing patient's judgment and behavior. Patient appears to be a danger to herself at this time due to patient unable to care for herself at this time. Patient would benefit form IPP for safety and stabilization.    Thoughts continue to be disorganized, illogical. Patient continues to present with paranoid delusions. Continues to present with poor insight and judgement. Denies depression and anxiety. Denies suicidal/homicidal ideations. Denies A/V hallucinations. Visible on the unit engaging with peers.    #Admit 2PC (9:27)    Plan:    #Schizophrenia  -Haldol 5mg BID  -Haldol dec given 9/21/20  -Haldol dec given 10/19/20    #Anxiety/agitation  -Ativan 1mg bedtime    #Tobacco Use Disorder  -Nicotine Gum PRN    #COPD  -Duoneb PRN for SOB  -Albuterol inhaler PRN    -Haldol 2mg Q8 PRN for Severe agitation  -Lorazepam 1mg Q12 PRN for Physical aggression  -Hydroxyzine 25mg Q12 PRN for anxiety    -Tylenol PRN for pain  -Pantoprazole for GERD  -Maalox PRN for dyspepsia    #Handoff  -Haldol dec given 9/21  -Haldol dec given 10/19  -Duoneb, albuterol inhaler PRN for SOB

## 2020-11-09 NOTE — DISCHARGE NOTE BEHAVIORAL HEALTH - MEDICATION SUMMARY - MEDICATIONS TO CHANGE
I will SWITCH the dose or number of times a day I take the medications listed below when I get home from the hospital:    Haldol Decanoate 50 mg/mL intramuscular solution  -- 0.5 milliliter(s) intramuscular once a month   I will SWITCH the dose or number of times a day I take the medications listed below when I get home from the hospital:    Haldol Decanoate 50 mg/mL intramuscular solution  -- 0.5 milliliter(s) intramuscular once a month    pantoprazole 40 mg oral delayed release tablet  -- 1 tab(s) by mouth once a day

## 2020-11-09 NOTE — DISCHARGE NOTE BEHAVIORAL HEALTH - NSBHDCRESPONSEFT_PSY_A_CORE
Thoughts continue to be disorganized, illogical. Patient continues to present with paranoid delusions. Continues to present with poor insight and judgement. Denies depression and anxiety. Denies suicidal/homicidal ideations. Denies A/V hallucinations. Patient being transferred to [place] for further care and medication management

## 2020-11-09 NOTE — DISCHARGE NOTE BEHAVIORAL HEALTH - TOBACCO CESSATION MEDS NOT PRESCIBED REASONS
Patient being transferred to [facility], no script needed. They can continue Nicotine Gum PRN

## 2020-11-09 NOTE — DISCHARGE NOTE BEHAVIORAL HEALTH - HPI (INCLUDE ILLNESS QUALITY, SEVERITY, DURATION, TIMING, CONTEXT, MODIFYING FACTORS, ASSOCIATED SIGNS AND SYMPTOMS)
63 year old female , domiciled in nursing home, with history of COPD, DVT, bipolar, and dementia, multiple prior inpatient psych, was at [place] for 2 years, recently discharged from inpatient psych [facility] transferred to [facility], sent to the ER for continuous medication non-compliant for the past months, worsening psychosis and agitation.    Patient is seen notable underlying disorganization. Reported she is upset at [employer] Leon, who kept of stealing the money of her family. She also accused Nixon Leon of Raping her. She declined provider to speak with her daughter, noting her daughter is conspiring  against her with [employer] Leon. Upon inquiring why she had refused her medications patient stated " I don't have psychiatric issue." She is overtly psychotic.   Collateral information obtained from patient's daughter Craol Ann Frazier who was in the [phone]  Per daughter, patient has hx COPD, DVT, hx of bipolar disorder vs schizophrenia and early dementia. She has been refusing all medications.   Patient was living at [place] for almost 3 years until November of 2020.  Last psychiatric Hospitalization was in March 2020 at [facility], then released to [facility]  where she is livign right now since May of 2020, [phone]. Per daughter patient has been increasingly paranoid and disorganized and sexually preoccupied. The current behaviors are not her baseline.    Pt seen and evaluated on IPP, chart reviewed. As per nursing report patient transferred from medical, refusing all meds and vitals. Patient alert and oriented to person and place. Patient calm and attempted to be cooperative. No agitation or aggression noted. Impaired concentration, had to be redirected multiple times. Patient presents with paranoid delusions. States she was brought her from [facility] because the facility is closing. States chinese placed her there. Patient states she has 2 daughters but one in really a machine. Patient states she sleeps well and appetite is good. When asked about depression she sates " its situational" Would not elaborate but denies depression and this time. Denies anxiety. Denies suicidal/homicidal ideations. Denies A/V hallucinations. When asked about allergies states " I rather not answer". Then later admits to an allergy to N. Interview cut short due to patients inability to concentrate, paranoia. 63 year old female , domiciled in nursing home, with history of COPD, DVT, bipolar, and dementia, multiple prior inpatient psych, was at [place] for 2 years, recently discharged from inpatient psych [facility] transferred to [facility], sent to the ER for continuous medication non-compliant for the past months, worsening psychosis and agitation.    Patient is seen notable underlying disorganization. Reported she is upset at [employer] Leon, who kept of stealing the money of her family. She also accused Nixon Leon of Raping her. She declined provider to speak with her daughter, noting her daughter is conspiring  against her with [employer] Leon. Upon inquiring why she had refused her medications patient stated " I don't have psychiatric issue." She is overtly psychotic.   Collateral information obtained from patient's daughter Carol Ann Frazier who was in the [phone]  Per daughter, patient has hx COPD, DVT, hx of bipolar disorder vs schizophrenia and early dementia. She has been refusing all medications.   Patient was living at [place] for almost 3 years until November of 2020.  Last psychiatric Hospitalization was in March 2020 at [facility], then released to [facility]  where she is living right now since May of 2020, [phone]. Per daughter patient has been increasingly paranoid and disorganized and sexually preoccupied. The current behaviors are not her baseline.    Pt seen and evaluated on IPP, chart reviewed. As per nursing report patient transferred from medical, refusing all meds and vitals. Patient alert and oriented to person and place. Patient calm and attempted to be cooperative. No agitation or aggression noted. Impaired concentration, had to be redirected multiple times. Patient presents with paranoid delusions. States she was brought her from [facility] because the facility is closing. States chinese placed her there. Patient states she has 2 daughters but one in really a machine. Patient states she sleeps well and appetite is good. When asked about depression she sates " its situational" Would not elaborate but denies depression and this time. Denies anxiety. Denies suicidal/homicidal ideations. Denies A/V hallucinations. When asked about allergies states " I rather not answer". Then later admits to an allergy to N. Interview cut short due to patients inability to concentrate, paranoia.    Patient seen and evaluated 11/10/20, chart reviewed. As per nursing report PRN ativan given for anxiety with good effect.  On evaluation patient calm and cooperative. Patient continues to have impaired concentration. Thoughts continue to be disorganized and illogical at times. Patient continues to present with paranoid delusions. Continues to present with poor insight and judgement. Denies depression and anxiety. Denies suicidal/homicidal ideations. Denies A/V hallucinations. Patient visible on the unit engaging with peers. 63 year old female , domiciled in nursing home, with history of COPD, DVT, bipolar, and dementia, multiple prior inpatient psych, was at [place] for 2 years, recently discharged from inpatient psych [facility] transferred to [facility], sent to the ER for continuous medication non-compliant for the past months, worsening psychosis and agitation.    Patient is seen notable underlying disorganization. Reported she is upset at [employer] Leon, who kept of stealing the money of her family. She also accused Nixon Leon of Raping her. She declined provider to speak with her daughter, noting her daughter is conspiring  against her with [employer] Leon. Upon inquiring why she had refused her medications patient stated " I don't have psychiatric issue." She is overtly psychotic.   Collateral information obtained from patient's daughter Carol Ann Frazier who was in the [phone]  Per daughter, patient has hx COPD, DVT, hx of bipolar disorder vs schizophrenia and early dementia. She has been refusing all medications.   Patient was living at [place] for almost 3 years until November of 2020.  Last psychiatric Hospitalization was in March 2020 at [facility], then released to [facility]  where she is living right now since May of 2020, [phone]. Per daughter patient has been increasingly paranoid and disorganized and sexually preoccupied. The current behaviors are not her baseline.  Pt seen and evaluated on IPP, chart reviewed. As per nursing report patient transferred from medical, refusing all meds and vitals. Patient alert and oriented to person and place. Patient calm and attempted to be cooperative. No agitation or aggression noted. Impaired concentration, had to be redirected multiple times. Patient presents with paranoid delusions. States she was brought her from [facility] because the facility is closing. States chinese placed her there. Patient states she has 2 daughters but one in really a machine. Patient states she sleeps well and appetite is good. When asked about depression she sates " its situational" Would not elaborate but denies depression and this time. Denies anxiety. Denies suicidal/homicidal ideations. Denies A/V hallucinations. When asked about allergies states " I rather not answer". Then later admits to an allergy to N. Interview cut short due to patients inability to concentrate, paranoia.  Patient seen and evaluated 11/10/20, chart reviewed. As per nursing report PRN ativan given for anxiety with good effect.  On evaluation patient calm and cooperative. Patient continues to have impaired concentration. Thoughts continue to be disorganized and illogical at times. Patient continues to present with paranoid delusions. Continues to present with poor insight and judgement. Denies depression and anxiety. Denies suicidal/homicidal ideations. Denies A/V hallucinations. Patient visible on the unit engaging with peers.    Patient seen and evaluated 12/2/20, chart reviewed. As per nursing report PRN PO ativan given with good effect, patient attempted to Elope yesterday. TOO in place to ensure medication compliance. On evaluation patient irritable. Patient continues to perseverate about discharge. Patient continues to have impaired concentration. Thoughts continue to be disorganized and illogical at times. Patient continues to present with paranoid delusions. Continues to present with poor insight and judgement. Denies depression and anxiety. Denies suicidal/homicidal ideations. Denies A/V hallucinations. Patient visible on the unit engaging with peers and periodically attending groups. Transfer to [facility] anticipated for 12/43/20. COVID test results negative. 63 year old female , domiciled in nursing home, with history of COPD, DVT, bipolar, and dementia, multiple prior inpatient psych, was at [place] for 2 years, recently discharged from inpatient psych [facility] transferred to [facility], sent to the ER for continuous medication non-compliant for the past months, worsening psychosis and agitation.    Patient is seen notable underlying disorganization. Reported she is upset at [employer] Leon, who kept of stealing the money of her family. She also accused Nixon Leon of Raping her. She declined provider to speak with her daughter, noting her daughter is conspiring  against her with [employer] Leon. Upon inquiring why she had refused her medications patient stated " I don't have psychiatric issue." She is overtly psychotic.   Collateral information obtained from patient's daughter Carol Ann Frazier who was in the [phone]  Per daughter, patient has hx COPD, DVT, hx of bipolar disorder vs schizophrenia and early dementia. She has been refusing all medications.   Patient was living at [place] for almost 3 years until November of 2020.  Last psychiatric Hospitalization was in March 2020 at [facility], then released to [facility]  where she is living right now since May of 2020, [phone]. Per daughter patient has been increasingly paranoid and disorganized and sexually preoccupied. The current behaviors are not her baseline.  Pt seen and evaluated on IPP, chart reviewed. As per nursing report patient transferred from medical, refusing all meds and vitals. Patient alert and oriented to person and place. Patient calm and attempted to be cooperative. No agitation or aggression noted. Impaired concentration, had to be redirected multiple times. Patient presents with paranoid delusions. States she was brought her from [facility] because the facility is closing. States chinese placed her there. Patient states she has 2 daughters but one in really a machine. Patient states she sleeps well and appetite is good. When asked about depression she sates " its situational" Would not elaborate but denies depression and this time. Denies anxiety. Denies suicidal/homicidal ideations. Denies A/V hallucinations. When asked about allergies states " I rather not answer". Then later admits to an allergy to N. Interview cut short due to patients inability to concentrate, paranoia.  Patient seen and evaluated 11/10/20, chart reviewed. As per nursing report PRN ativan given for anxiety with good effect.  On evaluation patient calm and cooperative. Patient continues to have impaired concentration. Thoughts continue to be disorganized and illogical at times. Patient continues to present with paranoid delusions. Continues to present with poor insight and judgement. Denies depression and anxiety. Denies suicidal/homicidal ideations. Denies A/V hallucinations. Patient visible on the unit engaging with peers.    Patient seen and evaluated 12/2/20, chart reviewed. As per nursing report PRN PO ativan given with good effect, patient attempted to Elope yesterday. TOO in place to ensure medication compliance. On evaluation patient irritable. Patient continues to perseverate about discharge. Patient continues to have impaired concentration. Thoughts continue to be disorganized and illogical at times. Patient continues to present with paranoid delusions. Continues to present with poor insight and judgement. Denies depression and anxiety. Denies suicidal/homicidal ideations. Denies A/V hallucinations. Patient visible on the unit engaging with peers and periodically attending groups. Transfer to [facility] anticipated for 12/3/20. COVID test results negative.

## 2020-11-09 NOTE — DISCHARGE NOTE BEHAVIORAL HEALTH - MEDICATION SUMMARY - MEDICATIONS TO TAKE
I will START or STAY ON the medications listed below when I get home from the hospital:    LORazepam 1 mg oral tablet  -- 1 tab(s) by mouth once a day (at bedtime) Continue as prescribed until told otherwise by your provider  -- Indication: For Anxiety/Agitation    haloperidol 2 mg/mL oral concentrate  -- 2.5 milliliter(s) by mouth 2 times a day Continue as prescribed until told otherwise by your provider  -- Indication: For Schizophrenia    Haldol Decanoate 50 mg/mL intramuscular solution  -- 100 milligram(s) intramuscular every 4 weeks Continue as prescribed until told otherwise by your provider. Last dose 10/19/20. Next dose due 11/16/20  -- Indication: For Schizophrenia    albuterol 90 mcg/inh inhalation aerosol  -- 2 puff(s) inhaled every 6 hours, As needed, Shortness of Breath and/or Wheezing Continue as prescribed until told otherwise by your provider  -- Indication: For COPD    ipratropium-albuterol 0.5 mg-2.5 mg/3 mLinhalation solution  -- 3 milliliter(s) inhaled every 6 hours, As needed, Shortness of Breath Continue as prescribed until told otherwise by your provider  -- Indication: For COPD   I will START or STAY ON the medications listed below when I get home from the hospital:    LORazepam 1 mg oral tablet  -- 1 tab(s) by mouth every 8 hours, As needed, Anxiety/aggression. Continue as prescribed until told otherwise by your provider  -- Indication: For Anxiety/Agitation    haloperidol 2 mg/mL oral concentrate  -- 2.5 milliliter(s) by mouth 2 times a day Continue as prescribed until told otherwise by your provider  -- Indication: For Schizophrenia    Haldol Decanoate 50 mg/mL intramuscular solution  -- 100 milligram(s) intramuscular every 4 weeks Continue as prescribed until told otherwise by your provider. Last dose 11/16/20. Next dose due 12/14/20  -- Indication: For Schizophrenia    ipratropium-albuterol 0.5 mg-2.5 mg/3 mLinhalation solution  -- 3 milliliter(s) inhaled every 6 hours, As needed, Shortness of Breath Continue as prescribed until told otherwise by your provider  -- Indication: For COPD    pantoprazole 40 mg oral delayed release tablet  -- 1 tab(s) by mouth once a day (before a meal)Continue as prescribed until told otherwise by your provider  -- Indication: For Stomach acid    nicotine  -- 2 milligram(s) by mouth every 2 hours as needed. Continue as prescribed until told otherwise by your provider  -- Indication: For Smoking cessation

## 2020-11-09 NOTE — DISCHARGE NOTE BEHAVIORAL HEALTH - NSTOBACCOREFERRAL_GEN_A_NCS
Yes Patient registered and referred to the [place] [facility]. Phone appointment scheduled for 12/5/2020 at 0900/Yes

## 2020-11-09 NOTE — DISCHARGE NOTE BEHAVIORAL HEALTH - NSFOLLOWUPCOMMENTS_ALL_CORE_SIUH
Patient being transferred to [place] for further care and medication management. Patient prescribed Haldol PO and Haldol BLANDON 9/21/20 and 10/19/20. Next dose due 11/16/20 Patient being transferred to [place] for further care and medication management. Patient prescribed Haldol PO and BLANDON given 9/21/20, 10/19/20, 11/16/20. Next dose due 12/14/20.

## 2020-11-09 NOTE — DISCHARGE NOTE BEHAVIORAL HEALTH - NSBHDCSIGEVENTSFT_PSY_A_CORE
Multiple incidents of agitation and aggression requiring PRN's Multiple incidents of agitation and aggression requiring PRN's PO and IM Multiple incidents of agitation and aggression requiring PRN's PO and IM. Refusal of medication. TOO granted to ensure medication compliance.

## 2020-11-09 NOTE — DISCHARGE NOTE BEHAVIORAL HEALTH - NSBHDCSUBSTHXFT_PSY_A_CORE
Per daughter she believes patient smokes, unsure how much Patient smokes, denies any illicit drugs or alcohol

## 2020-11-10 LAB — SARS-COV-2 RNA SPEC QL NAA+PROBE: SIGNIFICANT CHANGE UP

## 2020-11-10 PROCEDURE — 99231 SBSQ HOSP IP/OBS SF/LOW 25: CPT

## 2020-11-10 RX ADMIN — Medication 1 MILLIGRAM(S): at 12:36

## 2020-11-10 RX ADMIN — Medication 1 MILLIGRAM(S): at 03:52

## 2020-11-10 NOTE — CHART NOTE - NSCHARTNOTEFT_GEN_A_CORE
Reviewed and referred to chart notes, evaluated patient.    I agree with treatment team that patient requires long term psych hospitalization at [facility] for treatment and recovery.

## 2020-11-10 NOTE — PROGRESS NOTE BEHAVIORAL HEALTH - NSBHFUPINTERVALHXFT_PSY_A_CORE
Patient seen and evaluated, chart reviewed. As per nursing report PRN ativan given for anxiety with good effect.  On evaluation patient calm and cooperative. Patient continues to have impaired concentration. Thoughts continue to be disorganized and illogical at times. Patient continues to present with paranoid delusions. Continues to present with poor insight and judgement. Denies depression and anxiety. Denies suicidal/homicidal ideations. Denies A/V hallucinations. Patient visible on the unit engaging with peers.    Patient has not been accepted back to the [facility].  An application/tranfer for state placement is currently in progress as that is the only other viable treatment option. Covid test negative.     #Handoff  -Haldol dec given 9/21  -Haldol dec given 10/19  -Duoneb, albuterol inhaler PRN for SOB

## 2020-11-10 NOTE — PROGRESS NOTE BEHAVIORAL HEALTH - SUMMARY
63 year old female , domiciled in nursing home, with history of COPD, DVT, bipolar, and dementia, multiple prior inpatient psych, was at [facility] for 2 years, recently discharged from inpatient psych [facility] transferred to [facility], sent to the ER for continuous medication non-compliant for the past months, worsening psychosis and agitation.    Patient presents with decompensated psychiatric symptoms, secondary to medication non-compliance. Disorganized behavior noted and underlying psychosis appears to be impairing patient's judgment and behavior. Patient appears to be a danger to herself at this time due to patient unable to care for herself at this time. Patient would benefit form IPP for safety and stabilization.    Thoughts continue to be disorganized, illogical. Patient continues to present with paranoid delusions. Continues to present with poor insight and judgement. Denies depression and anxiety. Denies suicidal/homicidal ideations. Denies A/V hallucinations. Visible on the unit engaging with peers.    #Admit 2PC (9:27)    Plan:    #Schizophrenia  -Haldol 5mg BID  -Haldol dec given 9/21/20  -Haldol dec given 10/19/20    #Anxiety/agitation  -Ativan 1mg bedtime    #Tobacco Use Disorder  -Nicotine Gum PRN    #COPD  -Duoneb PRN for SOB  -Albuterol inhaler PRN    -Haldol 2mg Q8 PRN for Severe agitation  -Lorazepam 1mg Q12 PRN for Physical aggression  -Hydroxyzine 25mg Q12 PRN for anxiety    -Tylenol PRN for pain  -Pantoprazole for GERD  -Maalox PRN for dyspepsia    #Handoff  -Haldol dec given 9/21  -Haldol dec given 10/19  -Duoneb, albuterol inhaler PRN for SOB 63 year old female , domiciled in nursing home, with history of COPD, DVT, bipolar, and dementia, multiple prior inpatient psych, was at [place] for 2 years, recently discharged from inpatient psych [facility] transferred to [facility], sent to the ER for continuous medication non-compliant for the past months, worsening psychosis and agitation.    Patient presents with decompensated psychiatric symptoms, secondary to medication non-compliance. Disorganized behavior noted and underlying psychosis appears to be impairing patient's judgment and behavior. Patient appears to be a danger to herself at this time due to patient unable to care for herself at this time. Patient would benefit form IPP for safety and stabilization.    Thoughts continue to be disorganized, illogical. Patient continues to present with paranoid delusions. Continues to present with poor insight and judgement. Denies depression and anxiety. Denies suicidal/homicidal ideations. Denies A/V hallucinations. Visible on the unit engaging with peers.    #Admit 2PC (9:27)    Plan:    #Schizophrenia  -Haldol 5mg BID  -Haldol dec given 9/21/20  -Haldol dec given 10/19/20    #Anxiety/agitation  -Ativan 1mg bedtime    #Tobacco Use Disorder  -Nicotine Gum PRN    #COPD  -Duoneb PRN for SOB  -Albuterol inhaler PRN    -Haldol 2mg Q8 PRN for Severe agitation  -Lorazepam 1mg Q8 PRN for anxiety/aggression  -Hydroxyzine 25mg Q12 PRN for anxiety    -Tylenol PRN for pain  -Pantoprazole for GERD  -Maalox PRN for dyspepsia    #Handoff  -Haldol dec given 9/21  -Haldol dec given 10/19  -Duoneb, albuterol inhaler PRN for SOB

## 2020-11-11 PROCEDURE — 99231 SBSQ HOSP IP/OBS SF/LOW 25: CPT

## 2020-11-11 RX ORDER — PANTOPRAZOLE SODIUM 20 MG/1
1 TABLET, DELAYED RELEASE ORAL
Qty: 0 | Refills: 0 | DISCHARGE

## 2020-11-11 RX ORDER — PALIPERIDONE 1.5 MG/1
1 TABLET, EXTENDED RELEASE ORAL
Qty: 0 | Refills: 0 | DISCHARGE

## 2020-11-11 RX ORDER — HALOPERIDOL DECANOATE 100 MG/ML
0.5 INJECTION INTRAMUSCULAR
Qty: 0 | Refills: 0 | DISCHARGE

## 2020-11-11 RX ORDER — ALBUTEROL 90 UG/1
2 AEROSOL, METERED ORAL
Qty: 0 | Refills: 0 | DISCHARGE
Start: 2020-11-11

## 2020-11-11 RX ORDER — IPRATROPIUM/ALBUTEROL SULFATE 18-103MCG
3 AEROSOL WITH ADAPTER (GRAM) INHALATION
Qty: 0 | Refills: 0 | DISCHARGE
Start: 2020-11-11

## 2020-11-11 RX ORDER — HALOPERIDOL DECANOATE 100 MG/ML
2.5 INJECTION INTRAMUSCULAR
Qty: 0 | Refills: 0 | DISCHARGE
Start: 2020-11-11

## 2020-11-11 RX ORDER — HALOPERIDOL DECANOATE 100 MG/ML
1 INJECTION INTRAMUSCULAR
Qty: 0 | Refills: 0 | DISCHARGE

## 2020-11-11 RX ADMIN — Medication 1 MILLIGRAM(S): at 16:31

## 2020-11-11 RX ADMIN — Medication 1 MILLIGRAM(S): at 06:22

## 2020-11-11 NOTE — PROGRESS NOTE BEHAVIORAL HEALTH - SUMMARY
63 year old female , domiciled in nursing home, with history of COPD, DVT, bipolar, and dementia, multiple prior inpatient psych, was at [place] for 2 years, recently discharged from inpatient psych [facility] transferred to [facility], sent to the ER for continuous medication non-compliant for the past months, worsening psychosis and agitation.    Patient presents with decompensated psychiatric symptoms, secondary to medication non-compliance. Disorganized behavior noted and underlying psychosis appears to be impairing patient's judgment and behavior. Patient appears to be a danger to herself at this time due to patient unable to care for herself at this time. Patient would benefit form IPP for safety and stabilization.    Thoughts continue to be disorganized, illogical. Patient continues to present with paranoid delusions. Continues to present with poor insight and judgement. Denies depression and anxiety. Denies suicidal/homicidal ideations. Denies A/V hallucinations. Visible on the unit engaging with peers.    #Admit 2PC (9:27)    Plan:    #Schizophrenia  -Haldol 5mg BID  -Haldol dec given 9/21/20  -Haldol dec given 10/19/20    #Anxiety/agitation  -Ativan 1mg bedtime    #Tobacco Use Disorder  -Nicotine Gum PRN    #COPD  -Duoneb PRN for SOB  -Albuterol inhaler PRN    -Haldol 2mg Q8 PRN for Severe agitation  -Lorazepam 1mg Q12 PRN for Physical aggression  -Hydroxyzine 25mg Q12 PRN for anxiety    -Tylenol PRN for pain  -Pantoprazole for GERD  -Maalox PRN for dyspepsia    #Handoff  -Haldol dec given 9/21  -Haldol dec given 10/19  -Duoneb, albuterol inhaler PRN for SOB

## 2020-11-11 NOTE — PROGRESS NOTE BEHAVIORAL HEALTH - NSBHFUPINTERVALHXFT_PSY_A_CORE
Patient seen and evaluated, chart reviewed. As per nursing report PRN ativan given for anxiety with good effect.  On evaluation patient calm and cooperative. Patient continues to have impaired concentration. Thoughts continue to be disorganized and illogical at times. Patient continues to present with paranoid delusions. Continues to present with poor insight and judgement. Denies depression and anxiety. Denies suicidal/homicidal ideations. Denies A/V hallucinations. Patient visible on the unit engaging with peers.    Patient has not been accepted back to the [facility] and the [facility].  An application/tranfer for state placement is currently in progress as that is the only other viable treatment option.     #Handoff  -Haldol dec given 9/21  -Haldol dec given 10/19  -Duoneb, albuterol inhaler PRN for SOB Patient seen and evaluated, chart reviewed. As per nursing report PRN ativan given for anxiety with good effect.  On evaluation patient calm and cooperative. Patient continues to have impaired concentration. Thoughts continue to be disorganized and illogical at times. Patient continues to present with paranoid delusions. Continues to present with poor insight and judgement. Denies depression and anxiety. Denies suicidal/homicidal ideations. Denies A/V hallucinations. Patient visible on the unit engaging with peers.    Transfer to [facility] was placed on hold at this time.    #Handoff  -Haldol dec given 9/21  -Haldol dec given 10/19  -Duoneb, albuterol inhaler PRN for SOB

## 2020-11-11 NOTE — CHART NOTE - NSCHARTNOTEFT_GEN_A_CORE
The treatment team met with pt. to review treatment plan, medications and discharge plan.  Pt. continues to present as psychotic, disorganized and delusional.  There is no change in her mental status at this time.  Her transfer to [facility] was placed on hold due to issues with her legal documents.  This issue is being addressed by the attending doctor.  Pt. was made aware her discharge was on hold and verbalized her displeasure.  She continues to refuse her medication but has not presented with any behavioral issues on the unit.  Writer will continue to follow up with [facility] regarding pt.'s transfer.    Mental Status Exam:    Mood-  Irritable    Sleep-  Normal    Appetite-  Normal    ADL's-  Fair/Poor    Thought Process-  Disorganized/Illogical/Perseverative    Observation-  Routine    Pt. is not psychiatrically stable for discharge at this time.

## 2020-11-11 NOTE — PROGRESS NOTE BEHAVIORAL HEALTH - NSBHCHARTREVIEWVS_PSY_A_CORE FT
Vital Signs Last 24 Hrs  T(C): --  T(F): --  HR: 89 (10 Nov 2020 16:33) (89 - 89)  BP: 133/85 (10 Nov 2020 16:33) (133/85 - 133/85)  BP(mean): --  RR: 20 (10 Nov 2020 16:33) (20 - 20)  SpO2: --

## 2020-11-12 PROCEDURE — 99231 SBSQ HOSP IP/OBS SF/LOW 25: CPT

## 2020-11-12 RX ADMIN — Medication 1 MILLIGRAM(S): at 08:06

## 2020-11-12 RX ADMIN — Medication 325 MILLIGRAM(S): at 02:12

## 2020-11-12 RX ADMIN — Medication 650 MILLIGRAM(S): at 04:51

## 2020-11-12 RX ADMIN — Medication 1 MILLIGRAM(S): at 20:36

## 2020-11-12 NOTE — PROGRESS NOTE BEHAVIORAL HEALTH - SUMMARY
63 year old female , domiciled in nursing home, with history of COPD, DVT, bipolar, and dementia, multiple prior inpatient psych, was at [place] for 2 years, recently discharged from inpatient psych [facility] transferred to [facility], sent to the ER for continuous medication non-compliant for the past months, worsening psychosis and agitation.    Patient presents with decompensated psychiatric symptoms, secondary to medication non-compliance. Disorganized behavior noted and underlying psychosis appears to be impairing patient's judgment and behavior. Patient appears to be a danger to herself at this time due to patient unable to care for herself at this time. Patient would benefit form IPP for safety and stabilization.    Thoughts continue to be disorganized, illogical. Patient continues to present with paranoid delusions. Continues to present with poor insight and judgement. Denies depression and anxiety. Denies suicidal/homicidal ideations. Denies A/V hallucinations. Visible on the unit engaging with peers.    #Admit 2PC (9:27)    Plan:    #Schizophrenia  -Haldol 5mg BID  -Haldol dec given 9/21/20  -Haldol dec given 10/19/20    #Anxiety/agitation  -Ativan 1mg bedtime    #Tobacco Use Disorder  -Nicotine Gum PRN    #COPD  -Duoneb PRN for SOB  -Albuterol inhaler PRN    -Haldol 2mg Q8 PRN for Severe agitation  -Lorazepam 1mg Q12 PRN for Physical aggression  -Hydroxyzine 25mg Q12 PRN for anxiety    -Tylenol PRN for pain  -Pantoprazole for GERD  -Maalox PRN for dyspepsia    #Handoff  -Haldol dec given 9/21  -Haldol dec given 10/19  -Duoneb, albuterol inhaler PRN for SOB 63 year old female , domiciled in nursing home, with history of COPD, DVT, bipolar, and dementia, multiple prior inpatient psych, was at [place] for 2 years, recently discharged from inpatient psych [facility] transferred to [facility], sent to the ER for continuous medication non-compliant for the past months, worsening psychosis and agitation.    Patient presents with decompensated psychiatric symptoms, secondary to medication non-compliance. Disorganized behavior noted and underlying psychosis appears to be impairing patient's judgment and behavior. Patient appears to be a danger to herself at this time due to patient unable to care for herself at this time. Patient would benefit form IPP for safety and stabilization.    Thoughts continue to be disorganized, illogical. Patient continues to present with paranoid delusions. Continues to present with poor insight and judgement. Denies depression and anxiety. Denies suicidal/homicidal ideations. Denies A/V hallucinations. Visible on the unit engaging with peers.    #Admit 2PC (9:27)    Plan:    #Schizophrenia  -Haldol 5mg BID  -Haldol dec given 9/21/20  -Haldol dec given 10/19/20    #Tobacco Use Disorder  -Nicotine Gum PRN    #COPD  -Duoneb PRN for SOB  -Albuterol inhaler PRN    -Haldol 2mg Q8 PRN for Severe agitation  -Lorazepam 1mg Q8 PRN for anxiety/aggression  -Hydroxyzine 25mg Q12 PRN for anxiety    -Tylenol PRN for pain  -Pantoprazole for GERD  -Maalox PRN for dyspepsia    #Handoff  -Haldol dec given 9/21  -Haldol dec given 10/19  -Duoneb, albuterol inhaler PRN for SOB

## 2020-11-12 NOTE — PROGRESS NOTE BEHAVIORAL HEALTH - NSBHCHARTREVIEWVS_PSY_A_CORE FT
Vital Signs Last 24 Hrs-Patient denied  T(C): --  T(F): --  HR: --  BP: --  BP(mean): --  RR: --  SpO2: --

## 2020-11-12 NOTE — PROGRESS NOTE BEHAVIORAL HEALTH - NSBHFUPINTERVALHXFT_PSY_A_CORE
Patient seen and evaluated, chart reviewed. As per nursing report PRN ativan given for anxiety with good effect.  On evaluation patient irritable stating "These Italians, they are out to get me". Patient continues to have impaired concentration. Thoughts continue to be disorganized and illogical at times. Patient continues to present with paranoid delusions. Continues to present with poor insight and judgement. Denies depression and anxiety. Denies suicidal/homicidal ideations. Denies A/V hallucinations. Patient visible on the unit engaging with peers.    Transfer to [facility] was placed on hold at this time.    #Handoff  -Haldol dec given 9/21  -Haldol dec given 10/19  -Duoneb, albuterol inhaler PRN for SOB Patient seen and evaluated, chart reviewed. As per nursing report PRN ativan given for anxiety with good effect. Continues to refuse standing Haldol. On evaluation patient irritable stating "These Italians, they are out to get me". Patient continues to have impaired concentration. Thoughts continue to be disorganized and illogical at times. Patient continues to present with paranoid delusions. Continues to present with poor insight and judgement. Denies depression and anxiety. Denies suicidal/homicidal ideations. Denies A/V hallucinations. Patient visible on the unit engaging with peers.    Transfer to [facility] was placed on hold at this time.    #Handoff  -Haldol dec given 9/21  -Haldol dec given 10/19  -Duoneb, albuterol inhaler PRN for SOB

## 2020-11-13 PROCEDURE — 99231 SBSQ HOSP IP/OBS SF/LOW 25: CPT

## 2020-11-13 RX ORDER — HALOPERIDOL DECANOATE 100 MG/ML
100 INJECTION INTRAMUSCULAR ONCE
Refills: 0 | Status: COMPLETED | OUTPATIENT
Start: 2020-11-16 | End: 2020-11-16

## 2020-11-13 RX ADMIN — Medication 1 MILLIGRAM(S): at 22:02

## 2020-11-13 RX ADMIN — Medication 1 MILLIGRAM(S): at 14:43

## 2020-11-13 RX ADMIN — HALOPERIDOL DECANOATE 5 MILLIGRAM(S): 100 INJECTION INTRAMUSCULAR at 14:04
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
## 2020-11-13 NOTE — PROGRESS NOTE BEHAVIORAL HEALTH - NSBHFUPINTERVALHXFT_PSY_A_CORE
Patient seen and evaluated, chart reviewed. As per nursing report PRN ativan given for anxiety with good effect. Continues to refuse standing Haldol. On evaluation patient irritable. Patient continues to have impaired concentration. Thoughts continue to be disorganized and illogical at times. Patient continues to present with paranoid delusions. Continues to present with poor insight and judgement. Denies depression and anxiety. Denies suicidal/homicidal ideations. Denies A/V hallucinations. Patient visible on the unit engaging with peers.    Transfer to [facility] was placed on hold at this time.    #Handoff  -Haldol dec given 9/21  -Haldol dec given 10/19  -Duoneb, albuterol inhaler PRN for SOB Patient seen and evaluated in treatment team, chart reviewed. As per nursing report PRN ativan given for anxiety with good effect. Continues to refuse standing Haldol. On evaluation patient irritable. Patient perseverates about discharge. Patient continues to have impaired concentration. Thoughts continue to be disorganized and illogical at times. Patient continues to present with paranoid delusions. Continues to present with poor insight and judgement. Denies depression and anxiety. Denies suicidal/homicidal ideations. Denies A/V hallucinations. Patient visible on the unit engaging with peers.    Transfer to [facility] was placed on hold at this time.    #Handoff  -Haldol dec given 9/21  -Haldol dec given 10/19  -Duoneb, albuterol inhaler PRN for SOB Patient seen and evaluated in treatment team, chart reviewed. As per nursing report PRN ativan given for anxiety with good effect. Continues to refuse standing Haldol. On evaluation patient irritable. Patient perseverates about discharge. Patient continues to have impaired concentration. Thoughts continue to be disorganized and illogical at times. Patient continues to present with paranoid delusions. Continues to present with poor insight and judgement. Denies depression and anxiety. Denies suicidal/homicidal ideations. Denies A/V hallucinations. Patient visible on the unit engaging with peers.    Transfer to [facility] was placed on hold at this time.    #Handoff  -Haldol dec given 9/21  -Haldol dec given 10/19, next dose due Monday 11/16  -Duoneb, albuterol inhaler PRN for SOB Patient seen and evaluated in treatment team, chart reviewed. As per nursing report PRN ativan given for anxiety with good effect. Continues to refuse standing Haldol at times. On evaluation patient irritable. Patient perseverates about discharge. Patient continues to have impaired concentration. Thoughts continue to be disorganized and illogical at times. Patient continues to present with paranoid delusions. Continues to present with poor insight and judgement. Denies depression and anxiety. Denies suicidal/homicidal ideations. Denies A/V hallucinations. Patient visible on the unit engaging with peers.    Transfer [place] [facility] [place] at this time.    #Handoff  -Haldol dec given 9/21  -Haldol dec given 10/19, next dose due Monday 11/16  -Duoneb, albuterol inhaler PRN for SOB

## 2020-11-13 NOTE — PROGRESS NOTE BEHAVIORAL HEALTH - SUMMARY
63 year old female , domiciled in nursing home, with history of COPD, DVT, bipolar, and dementia, multiple prior inpatient psych, was at [place] for 2 years, recently discharged from inpatient psych [facility] transferred to [facility], sent to the ER for continuous medication non-compliant for the past months, worsening psychosis and agitation.    Patient presents with decompensated psychiatric symptoms, secondary to medication non-compliance. Disorganized behavior noted and underlying psychosis appears to be impairing patient's judgment and behavior. Patient appears to be a danger to herself at this time due to patient unable to care for herself at this time. Patient would benefit form IPP for safety and stabilization.    Thoughts continue to be disorganized, illogical. Patient continues to present with paranoid delusions. Continues to present with poor insight and judgement. Denies depression and anxiety. Denies suicidal/homicidal ideations. Denies A/V hallucinations. Visible on the unit engaging with peers.    #Admit 2PC (9:27)    Plan:    #Schizophrenia  -Haldol 5mg BID  -Haldol dec given 9/21/20  -Haldol dec given 10/19/20    #Tobacco Use Disorder  -Nicotine Gum PRN    #COPD  -Duoneb PRN for SOB  -Albuterol inhaler PRN    -Haldol 2mg Q8 PRN for Severe agitation  -Lorazepam 1mg Q8 PRN for anxiety/aggression  -Hydroxyzine 25mg Q12 PRN for anxiety    -Tylenol PRN for pain  -Pantoprazole for GERD  -Maalox PRN for dyspepsia    #Handoff  -Haldol dec given 9/21  -Haldol dec given 10/19  -Duoneb, albuterol inhaler PRN for SOB

## 2020-11-14 RX ADMIN — Medication 1 MILLIGRAM(S): at 15:23

## 2020-11-14 RX ADMIN — Medication 650 MILLIGRAM(S): at 05:23

## 2020-11-14 RX ADMIN — Medication 650 MILLIGRAM(S): at 12:12

## 2020-11-14 RX ADMIN — Medication 325 MILLIGRAM(S): at 11:12

## 2020-11-15 RX ADMIN — Medication 1 MILLIGRAM(S): at 20:17

## 2020-11-15 RX ADMIN — Medication 1 MILLIGRAM(S): at 11:53

## 2020-11-16 PROCEDURE — 99231 SBSQ HOSP IP/OBS SF/LOW 25: CPT

## 2020-11-16 RX ADMIN — Medication 1 MILLIGRAM(S): at 18:15

## 2020-11-16 RX ADMIN — HALOPERIDOL DECANOATE 100 MILLIGRAM(S): 100 INJECTION INTRAMUSCULAR at 12:32

## 2020-11-16 NOTE — CHART NOTE - NSCHARTNOTEFT_GEN_A_CORE
Reviewed and referred to chart notes and evaluated patient with MHLS .    I agree with treatment team that pt needs to be treated over objection for clinical improvement.
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
## 2020-11-16 NOTE — PROGRESS NOTE BEHAVIORAL HEALTH - NSBHCHARTREVIEWVS_PSY_A_CORE FT
Vital Signs Last 24 Hrs  T(C): 36.4 (16 Nov 2020 08:39), Max: 36.4 (16 Nov 2020 08:39)  T(F): 97.6 (16 Nov 2020 08:39), Max: 97.6 (16 Nov 2020 08:39)  HR: 86 (16 Nov 2020 08:39) (86 - 86)  BP: 90/62 (16 Nov 2020 08:39) (90/62 - 90/62)  BP(mean): --  RR: 18 (16 Nov 2020 08:39) (18 - 18)  SpO2: --

## 2020-11-16 NOTE — PROGRESS NOTE BEHAVIORAL HEALTH - NSBHFUPINTERVALHXFT_PSY_A_CORE
Patient seen and evaluated, chart reviewed. As per nursing report PRN ativan given for anxiety with good effect. Continues to refuse standing Haldol at times. On evaluation patient calm. Haldol dec due totay. Patient refusing. Patient perseverates about discharge. Patient continues to have impaired concentration. Thoughts continue to be disorganized and illogical at times. Patient continues to present with paranoid delusions. Continues to present with poor insight and judgement. Denies depression and anxiety. Denies suicidal/homicidal ideations. Denies A/V hallucinations. Patient visible on the unit engaging with peers.    Transfer [place] [facility] [place] hold at this time.    #Handoff  -Haldol dec given 9/21  -Haldol dec given 10/19, next dose due Monday 11/16-Patient refused  -Duoneb, albuterol inhaler PRN for SOB Patient seen and evaluated, chart reviewed. As per nursing report PRN ativan given for anxiety with good effect. Continues to refuse standing Haldol at times. On evaluation patient calm and cooperative. Haldol Dec due today. Patient compliant. Patient perseverates about discharge. Patient continues to have impaired concentration. Thoughts continue to be disorganized and illogical at times. Patient continues to present with paranoid delusions. Continues to present with poor insight and judgement. Denies depression and anxiety. Denies suicidal/homicidal ideations. Denies A/V hallucinations. Patient visible on the unit engaging with peers.    Transfer to [facility] was placed on hold at this time.    #Handoff  -Haldol dec given 9/21  -Haldol dec given 10/19  -Haldol Dec given today 11/16  -Duoneb, albuterol inhaler PRN for SOB

## 2020-11-16 NOTE — PROGRESS NOTE BEHAVIORAL HEALTH - SUMMARY
63 year old female , domiciled in nursing home, with history of COPD, DVT, bipolar, and dementia, multiple prior inpatient psych, was at [place] for 2 years, recently discharged from inpatient psych [facility] transferred to [facility], sent to the ER for continuous medication non-compliant for the past months, worsening psychosis and agitation.    Patient presents with decompensated psychiatric symptoms, secondary to medication non-compliance. Disorganized behavior noted and underlying psychosis appears to be impairing patient's judgment and behavior. Patient appears to be a danger to herself at this time due to patient unable to care for herself at this time. Patient would benefit form IPP for safety and stabilization.    Thoughts continue to be disorganized, illogical. Patient continues to present with paranoid delusions. Continues to present with poor insight and judgement. Denies depression and anxiety. Denies suicidal/homicidal ideations. Denies A/V hallucinations. Visible on the unit engaging with peers.    #Admit 2PC (9:27)    Plan:    #Schizophrenia  -Haldol 5mg BID  -Haldol dec given 9/21/20  -Haldol dec given 10/19/20  -Haldol dec due today 11/16-patient refused    #Tobacco Use Disorder  -Nicotine Gum PRN    #COPD  -Duoneb PRN for SOB  -Albuterol inhaler PRN    -Haldol 2mg Q8 PRN for Severe agitation  -Lorazepam 1mg Q8 PRN for anxiety/aggression  -Hydroxyzine 25mg Q12 PRN for anxiety    -Tylenol PRN for pain  -Pantoprazole for GERD  -Maalox PRN for dyspepsia    #Handoff  -Haldol dec given 9/21  -Haldol dec given 10/19, next dose due Monday 11/16-Patient refused  -Duoneb, albuterol inhaler PRN for SOB 63 year old female , domiciled in nursing home, with history of COPD, DVT, bipolar, and dementia, multiple prior inpatient psych, was at [place] for 2 years, recently discharged from inpatient psych [facility] transferred to [facility], sent to the ER for continuous medication non-compliant for the past months, worsening psychosis and agitation.    Patient presents with decompensated psychiatric symptoms, secondary to medication non-compliance. Disorganized behavior noted and underlying psychosis appears to be impairing patient's judgment and behavior. Patient appears to be a danger to herself at this time due to patient unable to care for herself at this time. Patient would benefit form IPP for safety and stabilization.    Thoughts continue to be disorganized, illogical. Patient continues to present with paranoid delusions. Continues to present with poor insight and judgement. Denies depression and anxiety. Denies suicidal/homicidal ideations. Denies A/V hallucinations. Visible on the unit engaging with peers.    #Admit 2PC (9:27)    Plan:    #Schizophrenia  -Haldol 5mg BID  -Haldol dec given 9/21/20  -Haldol dec given 10/19/20  -Haldol Dec given today 11/16    #Tobacco Use Disorder  -Nicotine Gum PRN    #COPD  -Duoneb PRN for SOB  -Albuterol inhaler PRN    -Haldol 2mg Q8 PRN for Severe agitation  -Lorazepam 1mg Q8 PRN for anxiety/aggression  -Hydroxyzine 25mg Q12 PRN for anxiety    -Tylenol PRN for pain  -Pantoprazole for GERD  -Maalox PRN for dyspepsia    #Handoff  -Haldol dec given 9/21  -Haldol dec given 10/19  -Haldol Dec given today 11/16  -Duoneb, albuterol inhaler PRN for SOB

## 2020-11-17 PROCEDURE — 99231 SBSQ HOSP IP/OBS SF/LOW 25: CPT

## 2020-11-17 RX ADMIN — Medication 1 MILLIGRAM(S): at 03:57

## 2020-11-17 RX ADMIN — Medication 1 MILLIGRAM(S): at 20:57

## 2020-11-17 NOTE — PROGRESS NOTE BEHAVIORAL HEALTH - NSBHCHARTREVIEWVS_PSY_A_CORE FT
Vital Signs Last 24 Hrs  T(C): --  T(F): --  HR: 96 (16 Nov 2020 16:05) (96 - 96)  BP: 131/71 (16 Nov 2020 16:05) (131/71 - 131/71)  BP(mean): --  RR: 16 (16 Nov 2020 16:05) (16 - 16)  SpO2: --

## 2020-11-17 NOTE — PROGRESS NOTE BEHAVIORAL HEALTH - SUMMARY
63 year old female , domiciled in nursing home, with history of COPD, DVT, bipolar, and dementia, multiple prior inpatient psych, was at [place] for 2 years, recently discharged from inpatient psych [facility] transferred to [facility], sent to the ER for continuous medication non-compliant for the past months, worsening psychosis and agitation.    Patient presents with decompensated psychiatric symptoms, secondary to medication non-compliance. Disorganized behavior noted and underlying psychosis appears to be impairing patient's judgment and behavior. Patient appears to be a danger to herself at this time due to patient unable to care for herself at this time. Patient would benefit form IPP for safety and stabilization.    Thoughts continue to be disorganized, illogical. Patient continues to present with paranoid delusions. Continues to present with poor insight and judgement. Denies depression and anxiety. Denies suicidal/homicidal ideations. Denies A/V hallucinations. Visible on the unit engaging with peers.    #Admit 2PC (9:27)    Plan:    #Schizophrenia  -Haldol 5mg BID  -Haldol dec given 9/21/20  -Haldol dec given 10/19/20  -Haldol Dec given 11/16    #Tobacco Use Disorder  -Nicotine Gum PRN    #COPD  -Duoneb PRN for SOB  -Albuterol inhaler PRN    -Haldol 2mg Q8 PRN for Severe agitation  -Lorazepam 1mg Q8 PRN for anxiety/aggression  -Hydroxyzine 25mg Q12 PRN for anxiety    -Tylenol PRN for pain  -Pantoprazole for GERD  -Maalox PRN for dyspepsia    #Handoff  -Haldol dec given 9/21  -Haldol dec given 10/19  -Haldol Dec given today 11/16  -Duoneb, albuterol inhaler PRN for SOB

## 2020-11-17 NOTE — PROGRESS NOTE BEHAVIORAL HEALTH - NSBHFUPINTERVALHXFT_PSY_A_CORE
Patient seen and evaluated, chart reviewed. As per nursing report PRN ativan given for anxiety with good effect. Continues to refuse standing Haldol at times. Next does of Haldol Dec given yesterday. Patient denies any side effects or adverse reactions. No side effects or adverse reactions noted. On evaluation patient calm and cooperative. Patient continues to perseverate about discharge. Patient continues to have impaired concentration. Thoughts continue to be disorganized and illogical at times. Patient continues to present with paranoid delusions. Continues to present with poor insight and judgement. Denies depression and anxiety. Denies suicidal/homicidal ideations. Denies A/V hallucinations. Patient visible on the unit engaging with peers.    [place] [facility] [place] at this time.    #Handoff  -Haldol dec given 9/21  -Haldol dec given 10/19  -Haldol Dec given 11/16  -Duoneb, albuterol inhaler PRN for SOB

## 2020-11-18 PROCEDURE — 99231 SBSQ HOSP IP/OBS SF/LOW 25: CPT

## 2020-11-18 RX ORDER — SODIUM CHLORIDE 0.65 %
1 AEROSOL, SPRAY (ML) NASAL
Refills: 0 | Status: DISCONTINUED | OUTPATIENT
Start: 2020-11-18 | End: 2020-12-03

## 2020-11-18 RX ADMIN — Medication 1 MILLIGRAM(S): at 21:54

## 2020-11-18 RX ADMIN — ALBUTEROL 2 PUFF(S): 90 AEROSOL, METERED ORAL at 17:05
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
## 2020-11-18 NOTE — PROGRESS NOTE BEHAVIORAL HEALTH - SUMMARY
63 year old female , domiciled in nursing home, with history of COPD, DVT, bipolar, and dementia, multiple prior inpatient psych, was at [place] for 2 years, recently discharged from inpatient psych [facility] transferred to [facility], sent to the ER for continuous medication non-compliant for the past months, worsening psychosis and agitation.    Patient presents with decompensated psychiatric symptoms, secondary to medication non-compliance. Disorganized behavior noted and underlying psychosis appears to be impairing patient's judgment and behavior. Patient appears to be a danger to herself at this time due to patient unable to care for herself at this time. Patient would benefit form IPP for safety and stabilization.    Thoughts continue to be disorganized, illogical. Patient continues to present with paranoid delusions. Continues to present with poor insight and judgement. Denies depression and anxiety. Denies suicidal/homicidal ideations. Denies A/V hallucinations. Visible on the unit engaging with peers.    #Admit 2PC (9:27)    Plan:    #Schizophrenia  -Haldol 5mg BID  -Haldol dec given 9/21/20  -Haldol dec given 10/19/20  -Haldol Dec given 11/16    #Tobacco Use Disorder  -Nicotine Gum PRN    #COPD  -Duoneb PRN for SOB  -Albuterol inhaler PRN    -Haldol 2mg Q8 PRN for Severe agitation  -Lorazepam 1mg Q8 PRN for anxiety/aggression  -Hydroxyzine 25mg Q12 PRN for anxiety    -Tylenol PRN for pain  -Pantoprazole for GERD  -Maalox PRN for dyspepsia    #Handoff  -Haldol dec given 9/21  -Haldol dec given 10/19  -Haldol Dec given today 11/16  -Duoneb, albuterol inhaler PRN for SOB

## 2020-11-18 NOTE — PROGRESS NOTE BEHAVIORAL HEALTH - NSBHCHARTREVIEWVS_PSY_A_CORE FT
Vital Signs Last 24 Hrs  T(C): --  T(F): --  HR: 98 (17 Nov 2020 15:41) (98 - 98)  BP: 138/75 (17 Nov 2020 15:41) (138/75 - 138/75)  BP(mean): --  RR: 20 (17 Nov 2020 15:41) (20 - 20)  SpO2: --

## 2020-11-18 NOTE — CHART NOTE - NSCHARTNOTEFT_GEN_A_CORE
The treatment team met with pt. to review treatment plan, medications and discharge plan.  There is no change in pt.'s mental status at this time.  She continues to present with paranoid delusions, disorganized and illogical. Pt. remains perseverative regarding her discharge and asks for writer multiple times daily to ask about her discharge.  She has been informed she was referred for state placement but becomes agitated whenever it is mentioned.  Pt. is continuing to refuse her oral standing medication.  She will only accept PRN's in the evening at her request.  Pt. denies any suicidal or homicidal ideation or any A/V hallucinations.      Pt. has been referred for state placement.  [place], [facility] [place] paperwork.  Court is scheduled for 11/19.  Once papers become available, they will be send to [facility].  Writer will continue to follow up.    Mental Status Exam:    Mood-  Irritable/Calm    Sleep-  Normal    ADL's-  Fair/Poor    Thought Process-  Delusional/Disorganized/Illogical    Observation-  Routine    Pt. is not stable for discharge at this time.

## 2020-11-19 PROCEDURE — 99231 SBSQ HOSP IP/OBS SF/LOW 25: CPT

## 2020-11-19 RX ADMIN — Medication 1 MILLIGRAM(S): at 20:33

## 2020-11-19 RX ADMIN — HALOPERIDOL DECANOATE 5 MILLIGRAM(S): 100 INJECTION INTRAMUSCULAR at 08:18

## 2020-11-19 NOTE — PROGRESS NOTE BEHAVIORAL HEALTH - NSBHFUPINTERVALHXFT_PSY_A_CORE
Patient seen and evaluated, chart reviewed. As per nursing report PRN ativan given for anxiety with good effect. Continues to refuse standing Haldol at times. On evaluation patient calm and cooperative. Patient continues to perseverate about discharge. Patient continues to have impaired concentration. Thoughts continue to be disorganized and illogical at times. Patient continues to present with paranoid delusions. Continues to present with poor insight and judgement. Denies depression and anxiety. Denies suicidal/homicidal ideations. Denies A/V hallucinations. Patient visible on the unit engaging with peers and periodically attending groups.    Transfer to [facility] was placed on hold at this time.    #Handoff  -Haldol dec given 9/21  -Haldol dec given 10/19  -Haldol Dec given 11/16  -Duoneb, albuterol inhaler PRN for SOB Patient seen and evaluated, chart reviewed. As per nursing report PRN ativan given for anxiety with good effect. Continues to refuse standing Haldol at times. On evaluation patient calm and cooperative. Patient continues to perseverate about discharge. Patient continues to have impaired concentration. Thoughts continue to be disorganized and illogical at times. Patient continues to present with paranoid delusions. Continues to present with poor insight and judgement. Denies depression and anxiety. Denies suicidal/homicidal ideations. Denies A/V hallucinations. Patient visible on the unit engaging with peers and periodically attending groups.    [place] [facility] [place] at this time.    Court scheduled for today    #Handoff  -Haldol dec given 9/21  -Haldol dec given 10/19  -Haldol Dec given 11/16  -Duoneb, albuterol inhaler PRN for SOB

## 2020-11-20 PROCEDURE — 99231 SBSQ HOSP IP/OBS SF/LOW 25: CPT

## 2020-11-20 RX ORDER — HALOPERIDOL DECANOATE 100 MG/ML
2 INJECTION INTRAMUSCULAR EVERY 8 HOURS
Refills: 0 | Status: DISCONTINUED | OUTPATIENT
Start: 2020-11-20 | End: 2020-12-03

## 2020-11-20 RX ADMIN — HALOPERIDOL DECANOATE 5 MILLIGRAM(S): 100 INJECTION INTRAMUSCULAR at 20:37

## 2020-11-20 RX ADMIN — HALOPERIDOL DECANOATE 5 MILLIGRAM(S): 100 INJECTION INTRAMUSCULAR at 08:13

## 2020-11-20 NOTE — CHART NOTE - NSCHARTNOTEFT_GEN_A_CORE
The treatment team met with pt. to review treatment team, medications and discharge plan.  There is no change in pt.'s mental status at this time.  Pt. continues to perseverate about discharge.  There have been no recent behavioral issues.  Pt. continues to refuse her medication.  Pt. continues to refuse showers, even with verbal encouragement.   A court hearing was held yesterday for retention.  The treatment team has maintained contact with pt.'s daughter via email to keep her updated regarding discharge planning.    Once Retention court paperwork becomes available, they will be sent to [facility].  Once the appropriate legals are received, [facility] will issues a new transfer date for pt.  Writer will continue to follow up.    Mental Status Exam:       Mood-  Calm    Sleep-  Normal    Appetite-  Normal    ADL's-  Fair/Poor    Observation-  Routine    Thought Process-  Disorganized/illogical/Perseverative    Pt. is not psychiatrically stable for discharge at this time.

## 2020-11-20 NOTE — PROGRESS NOTE BEHAVIORAL HEALTH - NSBHFUPINTERVALHXFT_PSY_A_CORE
Patient seen and evaluated, chart reviewed. As per nursing report PRN ativan given for anxiety with good effect. On evaluation patient calm and cooperative. Patient continues to perseverate about discharge. Patient continues to have impaired concentration. Thoughts continue to be disorganized and illogical at times. Patient continues to present with paranoid delusions. Continues to present with poor insight and judgement. Denies depression and anxiety. Denies suicidal/homicidal ideations. Denies A/V hallucinations. Patient visible on the unit engaging with peers and periodically attending groups.    Transfer to [facility] was placed on hold at this time.    #Handoff  -Haldol dec given 9/21  -Haldol dec given 10/19  -Haldol Dec given 11/16  -Duoneb, albuterol inhaler PRN for SOB Patient seen and evaluated in treatment team, chart reviewed. As per nursing report PRN ativan given for anxiety with good effect. On evaluation patient calm and cooperative. Patient continues to perseverate about discharge. Patient continues to have impaired concentration. Thoughts continue to be disorganized and illogical at times. Patient continues to present with paranoid delusions. Continues to present with poor insight and judgement. Denies depression and anxiety. Denies suicidal/homicidal ideations. Denies A/V hallucinations. Patient visible on the unit engaging with peers and periodically attending groups.    [place] [facility] [place] at this time.    #Handoff  -Haldol dec given 9/21  -Haldol dec given 10/19  -Haldol Dec given 11/16  -Duoneb, albuterol inhaler PRN for SOB

## 2020-11-20 NOTE — PROGRESS NOTE BEHAVIORAL HEALTH - NSBHCHARTREVIEWVS_PSY_A_CORE FT
Vital Signs Last 24 Hrs  T(C): 36.4 (20 Nov 2020 08:49), Max: 36.4 (20 Nov 2020 08:49)  T(F): 97.6 (20 Nov 2020 08:49), Max: 97.6 (20 Nov 2020 08:49)  HR: 82 (20 Nov 2020 08:49) (82 - 82)  BP: 121/57 (20 Nov 2020 08:49) (121/57 - 121/57)  BP(mean): --  RR: 16 (20 Nov 2020 08:49) (16 - 16)  SpO2: --

## 2020-11-20 NOTE — PROGRESS NOTE BEHAVIORAL HEALTH - SUMMARY
63 year old female , domiciled in nursing home, with history of COPD, DVT, bipolar, and dementia, multiple prior inpatient psych, was at [place] for 2 years, recently discharged from inpatient psych [facility] transferred to [facility], sent to the ER for continuous medication non-compliant for the past months, worsening psychosis and agitation.    Patient presents with decompensated psychiatric symptoms, secondary to medication non-compliance. Disorganized behavior noted and underlying psychosis appears to be impairing patient's judgment and behavior. Patient appears to be a danger to herself at this time due to patient unable to care for herself at this time. Patient would benefit form IPP for safety and stabilization.    Thoughts continue to be disorganized, illogical. Patient continues to present with paranoid delusions. Continues to present with poor insight and judgement. Denies depression and anxiety. Denies suicidal/homicidal ideations. Denies A/V hallucinations. Visible on the unit engaging with peers.    #Admit 2PC (9:27)    Plan:    #Schizophrenia  -Haldol 5mg BID  -Haldol dec given 9/21/20  -Haldol dec given 10/19/20  -Haldol Dec given 11/16    #Tobacco Use Disorder  -Nicotine Gum PRN    #COPD  -Duoneb PRN for SOB  -Albuterol inhaler PRN    -Haldol 2mg Q8 PRN for Severe agitation  -Lorazepam 1mg Q8 PRN for anxiety/aggression  -Hydroxyzine 25mg Q12 PRN for anxiety    -Tylenol PRN for pain  -Pantoprazole for GERD  -Maalox PRN for dyspepsia    #Handoff  -Haldol dec given 9/21  -Haldol dec given 10/19  -Haldol Dec given today 11/16  -Duoneb, albuterol inhaler PRN for SOB

## 2020-11-21 RX ORDER — HALOPERIDOL DECANOATE 100 MG/ML
5 INJECTION INTRAMUSCULAR ONCE
Refills: 0 | Status: COMPLETED | OUTPATIENT
Start: 2020-11-21 | End: 2020-11-21

## 2020-11-21 RX ADMIN — HALOPERIDOL DECANOATE 2 MILLIGRAM(S): 100 INJECTION INTRAMUSCULAR at 08:16

## 2020-11-21 RX ADMIN — Medication 1 MILLIGRAM(S): at 22:34

## 2020-11-21 RX ADMIN — HALOPERIDOL DECANOATE 5 MILLIGRAM(S): 100 INJECTION INTRAMUSCULAR at 22:35

## 2020-11-21 RX ADMIN — Medication 1 MILLIGRAM(S): at 16:15

## 2020-11-21 RX ADMIN — Medication 1 MILLIGRAM(S): at 08:25

## 2020-11-21 RX ADMIN — HALOPERIDOL DECANOATE 5 MILLIGRAM(S): 100 INJECTION INTRAMUSCULAR at 08:44
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
## 2020-11-23 PROCEDURE — 99231 SBSQ HOSP IP/OBS SF/LOW 25: CPT

## 2020-11-23 RX ORDER — HALOPERIDOL DECANOATE 100 MG/ML
5 INJECTION INTRAMUSCULAR
Refills: 0 | Status: DISCONTINUED | OUTPATIENT
Start: 2020-11-23 | End: 2020-12-03

## 2020-11-23 RX ORDER — HALOPERIDOL DECANOATE 100 MG/ML
5 INJECTION INTRAMUSCULAR
Refills: 0 | Status: DISCONTINUED | OUTPATIENT
Start: 2020-11-23 | End: 2020-11-23

## 2020-11-23 RX ORDER — IPRATROPIUM/ALBUTEROL SULFATE 18-103MCG
3 AEROSOL WITH ADAPTER (GRAM) INHALATION EVERY 6 HOURS
Refills: 0 | Status: DISCONTINUED | OUTPATIENT
Start: 2020-11-23 | End: 2020-12-03

## 2020-11-23 RX ADMIN — HALOPERIDOL DECANOATE 5 MILLIGRAM(S): 100 INJECTION INTRAMUSCULAR at 10:28

## 2020-11-23 RX ADMIN — HALOPERIDOL DECANOATE 5 MILLIGRAM(S): 100 INJECTION INTRAMUSCULAR at 20:00
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
## 2020-11-23 NOTE — PROGRESS NOTE BEHAVIORAL HEALTH - SUMMARY
63 year old female , domiciled in nursing home, with history of COPD, DVT, bipolar, and dementia, multiple prior inpatient psych, was at [place] for 2 years, recently discharged from inpatient psych [facility] transferred to [facility], sent to the ER for continuous medication non-compliant for the past months, worsening psychosis and agitation.    Patient presents with decompensated psychiatric symptoms, secondary to medication non-compliance. Disorganized behavior noted and underlying psychosis appears to be impairing patient's judgment and behavior. Patient appears to be a danger to herself at this time due to patient unable to care for herself at this time. Patient would benefit form IPP for safety and stabilization.    Thoughts continue to be disorganized, illogical. Patient continues to present with paranoid delusions. Continues to present with poor insight and judgement. Denies depression and anxiety. Denies suicidal/homicidal ideations. Denies A/V hallucinations. Visible on the unit engaging with peers.    #Admit 2PC (9:27)    Plan:    #Schizophrenia  -Haldol PO 5mg BID  -Haldol IM 5mg BID if patient refuses PO  -Haldol dec given 9/21/20  -Haldol dec given 10/19/20  -Haldol Dec given 11/16    #Tobacco Use Disorder  -Nicotine Gum PRN    #COPD  -Duoneb PRN for SOB    -Haldol 2mg Q8 PRN for Severe agitation  -Lorazepam 1mg Q8 PRN for anxiety/aggression  -Hydroxyzine 25mg Q12 PRN for anxiety    -Tylenol PRN for pain  -Pantoprazole for GERD  -Maalox PRN for dyspepsia    #Handoff  -Haldol dec given 9/21  -Haldol dec given 10/19  -Haldol Dec given today 11/16  -Duoneb, albuterol inhaler PRN for SOB

## 2020-11-24 PROCEDURE — 99231 SBSQ HOSP IP/OBS SF/LOW 25: CPT

## 2020-11-24 RX ADMIN — Medication 650 MILLIGRAM(S): at 23:36

## 2020-11-24 RX ADMIN — Medication 1 MILLIGRAM(S): at 20:50

## 2020-11-24 RX ADMIN — HALOPERIDOL DECANOATE 5 MILLIGRAM(S): 100 INJECTION INTRAMUSCULAR at 09:22

## 2020-11-24 RX ADMIN — Medication 1 MILLIGRAM(S): at 01:25

## 2020-11-24 RX ADMIN — HALOPERIDOL DECANOATE 5 MILLIGRAM(S): 100 INJECTION INTRAMUSCULAR at 20:50
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
## 2020-11-24 NOTE — PROGRESS NOTE BEHAVIORAL HEALTH - NSBHCHARTREVIEWVS_PSY_A_CORE FT
Vital Signs Last 24 Hrs  T(C): --  T(F): --  HR: 80 (23 Nov 2020 16:07) (80 - 80)  BP: 144/72 (23 Nov 2020 16:07) (144/72 - 144/72)  BP(mean): --  RR: 20 (23 Nov 2020 16:07) (20 - 20)  SpO2: --

## 2020-11-24 NOTE — PROGRESS NOTE BEHAVIORAL HEALTH - SUMMARY
63 year old female , domiciled in nursing home, with history of COPD, DVT, bipolar, and dementia, multiple prior inpatient psych, was at [place] for 2 years, recently discharged from inpatient psych [facility] transferred to [facility], sent to the ER for continuous medication non-compliant for the past months, worsening psychosis and agitation.    Patient presents with decompensated psychiatric symptoms, secondary to medication non-compliance. Disorganized behavior noted and underlying psychosis appears to be impairing patient's judgment and behavior. Patient appears to be a danger to herself at this time due to patient unable to care for herself at this time. Patient would benefit form IPP for safety and stabilization.    Thoughts continue to be disorganized, illogical. Patient continues to present with paranoid delusions. Continues to present with poor insight and judgement. Denies depression and anxiety. Denies suicidal/homicidal ideations. Denies A/V hallucinations. Visible on the unit engaging with peers.    #Admit 2PC (9:27)    Plan:    #Schizophrenia  -Haldol PO 5mg BID  -Haldol IM 5mg BID if patient refuses PO  -Haldol dec given 9/21/20  -Haldol dec given 10/19/20  -Haldol Dec given 11/16    #Tobacco Use Disorder  -Nicotine Gum PRN    #COPD  -Duoneb PRN for SOB    -Haldol 2mg Q8 PRN for Severe agitation  -Lorazepam 1mg Q8 PRN for anxiety/aggression  -Hydroxyzine 25mg Q12 PRN for anxiety    -Tylenol PRN for pain  -Pantoprazole for GERD  -Maalox PRN for dyspepsia    #Handoff  -Haldol dec given 9/21  -Haldol dec given 10/19  -Haldol Dec given today 11/16  -Duoneb, albuterol inhaler PRN for SOB

## 2020-11-25 PROCEDURE — 99231 SBSQ HOSP IP/OBS SF/LOW 25: CPT

## 2020-11-25 RX ADMIN — HALOPERIDOL DECANOATE 5 MILLIGRAM(S): 100 INJECTION INTRAMUSCULAR at 20:06

## 2020-11-25 RX ADMIN — HALOPERIDOL DECANOATE 5 MILLIGRAM(S): 100 INJECTION INTRAMUSCULAR at 09:41

## 2020-11-25 RX ADMIN — Medication 650 MILLIGRAM(S): at 00:35

## 2020-11-25 NOTE — PROGRESS NOTE BEHAVIORAL HEALTH - SUMMARY
63 year old female , domiciled in nursing home, with history of COPD, DVT, bipolar, and dementia, multiple prior inpatient psych, was at [place] for 2 years, recently discharged from inpatient psych [facility] transferred to [facility], sent to the ER for continuous medication non-compliant for the past months, worsening psychosis and agitation.    Patient presents with decompensated psychiatric symptoms, secondary to medication non-compliance. Disorganized behavior noted and underlying psychosis appears to be impairing patient's judgment and behavior. Patient appears to be a danger to herself at this time due to patient unable to care for herself at this time. Patient would benefit form IPP for safety and stabilization.    Thoughts continue to be disorganized, illogical. Patient continues to present with paranoid delusions. Continues to present with poor insight and judgement. Denies depression and anxiety. Denies suicidal/homicidal ideations. Denies A/V hallucinations. Visible on the unit engaging with peers.    #Admit 2PC (9:27)    Plan:    #Schizophrenia  -Haldol PO 5mg BID  -Haldol IM 5mg BID if patient refuses PO  -Haldol dec given 9/21/20  -Haldol dec given 10/19/20  -Haldol Dec given 11/16    #Tobacco Use Disorder  -Nicotine Gum PRN    #COPD  -Duoneb PRN for SOB    -Haldol 2mg Q8 PRN for Severe agitation  -Lorazepam 1mg Q8 PRN for anxiety/aggression  -Hydroxyzine 25mg Q12 PRN for anxiety    -Tylenol PRN for pain  -Pantoprazole for GERD  -Maalox PRN for dyspepsia    #Handoff  -Haldol dec given 9/21  -Haldol dec given 10/19  -Haldol Dec given today 11/16  -Duoneb, albuterol inhaler PRN for SOB

## 2020-11-25 NOTE — PROGRESS NOTE BEHAVIORAL HEALTH - NSBHFUPINTERVALHXFT_PSY_A_CORE
Patient seen and evaluated, chart reviewed. As per nursing report patient irritable yesterday. PRN PO ativan given with good effect. TOO in place to ensure medication compliance. On evaluation patient irritable stating "I wanted to talk to you". Patient continues to perseverate about discharge. Patient continues to have impaired concentration. Thoughts continue to be disorganized and illogical at times. Patient continues to present with paranoid delusions. Continues to present with poor insight and judgement. Denies depression and anxiety. Denies suicidal/homicidal ideations. Denies A/V hallucinations. Patient visible on the unit engaging with peers and periodically attending groups.    Transfer to [facility] was placed on hold at this time.    #Handoff  -Haldol dec given 9/21  -Haldol dec given 10/19  -Haldol Dec given 11/16  -Duoneb PRN for SOB
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
## 2020-11-26 RX ADMIN — HALOPERIDOL DECANOATE 5 MILLIGRAM(S): 100 INJECTION INTRAMUSCULAR at 08:26

## 2020-11-26 RX ADMIN — HALOPERIDOL DECANOATE 5 MILLIGRAM(S): 100 INJECTION INTRAMUSCULAR at 20:20

## 2020-11-26 RX ADMIN — Medication 1 MILLIGRAM(S): at 01:25

## 2020-11-27 PROCEDURE — 99231 SBSQ HOSP IP/OBS SF/LOW 25: CPT

## 2020-11-27 RX ADMIN — HALOPERIDOL DECANOATE 5 MILLIGRAM(S): 100 INJECTION INTRAMUSCULAR at 20:26

## 2020-11-27 RX ADMIN — HALOPERIDOL DECANOATE 5 MILLIGRAM(S): 100 INJECTION INTRAMUSCULAR at 08:03

## 2020-11-27 RX ADMIN — Medication 1 MILLIGRAM(S): at 13:52

## 2020-11-27 RX ADMIN — Medication 1 MILLIGRAM(S): at 22:34

## 2020-11-27 NOTE — PROGRESS NOTE BEHAVIORAL HEALTH - NSBHFUPINTERVALHXFT_PSY_A_CORE
Patient seen and evaluated in treatment team, chart reviewed. As per nursing report PRN PO ativan given with good effect. TOO in place to ensure medication compliance. On evaluation patient calm and cooperative. Patient continues to perseverate about discharge. Patient continues to have impaired concentration. Thoughts continue to be disorganized and illogical at times. Patient continues to present with paranoid delusions. Continues to present with poor insight and judgement. Denies depression and anxiety. Denies suicidal/homicidal ideations. Denies A/V hallucinations. Patient visible on the unit engaging with peers and periodically attending groups.    Anticipated transfer to [facility] [place]    #Handoff  -Haldol dec given 9/21  -Haldol dec given 10/19  -Haldol Dec given 11/16  -Duoneb PRN for SOB

## 2020-11-27 NOTE — PROGRESS NOTE BEHAVIORAL HEALTH - SUMMARY
63 year old female , domiciled in nursing home, with history of COPD, DVT, bipolar, and dementia, multiple prior inpatient psych, was at [place] for 2 years, recently discharged from inpatient psych [facility] transferred to [facility], sent to the ER for continuous medication non-compliant for the past months, worsening psychosis and agitation.    Patient presents with decompensated psychiatric symptoms, secondary to medication non-compliance. Disorganized behavior noted and underlying psychosis appears to be impairing patient's judgment and behavior. Patient appears to be a danger to herself at this time due to patient unable to care for herself at this time. Patient would benefit form IPP for safety and stabilization.    Thoughts continue to be disorganized, illogical. Patient continues to present with paranoid delusions. Continues to present with poor insight and judgement. Denies depression and anxiety. Denies suicidal/homicidal ideations. Denies A/V hallucinations. Visible on the unit engaging with peers.    #Admit 2PC (9:27)    Plan:    #Schizophrenia  -Haldol PO 5mg BID  -Haldol IM 5mg BID if patient refuses PO  -Haldol dec given 9/21/20  -Haldol dec given 10/19/20  -Haldol Dec given 11/16    #Tobacco Use Disorder  -Nicotine Gum PRN    #COPD  -Duoneb PRN for SOB    -Haldol 2mg Q8 PRN for Severe agitation  -Lorazepam 1mg Q8 PRN for anxiety/aggression  -Hydroxyzine 25mg Q12 PRN for anxiety    -Tylenol PRN for pain  -Pantoprazole for GERD  -Maalox PRN for dyspepsia    #Handoff  -Haldol dec given 9/21  -Haldol dec given 10/19  -Haldol Dec given today 11/16  -Duoneb, albuterol inhaler PRN for SOB

## 2020-11-27 NOTE — CHART NOTE - NSCHARTNOTEFT_GEN_A_CORE
The treatment team met with pt. to review treatment plan, medication and discharge plan.  There is no change in pt.'s mental status at this time.  Pt. remains disorganized, paranoid, delusional and perseverative regarding her discharge.  She continues to refuse most medication.  However, she is present and active on the unit.  There have been no behavioral outburst in several days.  Pt. continues to minimally address ADL's even with encouragement and staff offering to assist.    Pt. is scheduled for [place] [facility] [place].  A COVID test will be done on 11/30. Once the COVID test is completed, the COVID form, Order of Transfer and Medication form will be sent to [facility].  There are no known barriers to discharge at this time.    Mental Status Exam:    Mood-  Anxious    Sleep-  Normal    Appetite-  Normal    ADL's- Fair/Poor    Thought Process-  Disorganized/Perseverative/Illogical/Tangential    Observation-  Routine    Pt. is not psychiatrically stable for discharge at this time.

## 2020-11-27 NOTE — PROGRESS NOTE BEHAVIORAL HEALTH - NSBHCHARTREVIEWVS_PSY_A_CORE FT
ICU Vital Signs Last 24 Hrs  T(C): -- Patient refused  T(F): --  HR: --  BP: --  BP(mean): --  ABP: --  ABP(mean): --  RR: --  SpO2: --

## 2020-11-28 RX ADMIN — HALOPERIDOL DECANOATE 5 MILLIGRAM(S): 100 INJECTION INTRAMUSCULAR at 20:06

## 2020-11-28 RX ADMIN — HALOPERIDOL DECANOATE 5 MILLIGRAM(S): 100 INJECTION INTRAMUSCULAR at 07:53

## 2020-11-29 RX ADMIN — HALOPERIDOL DECANOATE 5 MILLIGRAM(S): 100 INJECTION INTRAMUSCULAR at 08:10

## 2020-11-29 RX ADMIN — Medication 1 MILLIGRAM(S): at 00:49

## 2020-11-29 RX ADMIN — HALOPERIDOL DECANOATE 5 MILLIGRAM(S): 100 INJECTION INTRAMUSCULAR at 21:23

## 2020-11-29 NOTE — PROGRESS NOTE BEHAVIORAL HEALTH - NSBHFUPINTERVALHXFT_PSY_A_CORE
The nurse called reporting pt refuses PO medication.    Pt has court order for mandatory medication treatment. Therefore the nursing team needs to hold pt for 30 seconds to administer injection of 5mg Haldol at bedtime.    No injury reported. No other behavioral issues reported.

## 2020-11-30 PROCEDURE — 99231 SBSQ HOSP IP/OBS SF/LOW 25: CPT

## 2020-11-30 RX ADMIN — HALOPERIDOL DECANOATE 5 MILLIGRAM(S): 100 INJECTION INTRAMUSCULAR at 08:47

## 2020-11-30 RX ADMIN — PANTOPRAZOLE SODIUM 40 MILLIGRAM(S): 20 TABLET, DELAYED RELEASE ORAL at 08:48

## 2020-11-30 RX ADMIN — Medication 1 MILLIGRAM(S): at 20:50

## 2020-11-30 RX ADMIN — HALOPERIDOL DECANOATE 5 MILLIGRAM(S): 100 INJECTION INTRAMUSCULAR at 20:10

## 2020-11-30 RX ADMIN — Medication 1 MILLIGRAM(S): at 00:33

## 2020-11-30 NOTE — PROGRESS NOTE BEHAVIORAL HEALTH - SUMMARY
63 year old female , domiciled in nursing home, with history of COPD, DVT, bipolar, and dementia, multiple prior inpatient psych, was at [place] for 2 years, recently discharged from inpatient psych [facility] transferred to [facility], sent to the ER for continuous medication non-compliant for the past months, worsening psychosis and agitation.    Patient presents with decompensated psychiatric symptoms, secondary to medication non-compliance. Disorganized behavior noted and underlying psychosis appears to be impairing patient's judgment and behavior. Patient appears to be a danger to herself at this time due to patient unable to care for herself at this time. Patient would benefit form IPP for safety and stabilization.    Thoughts continue to be disorganized, illogical. Patient continues to present with paranoid delusions. Continues to present with poor insight and judgement. Denies depression and anxiety. Denies suicidal/homicidal ideations. Denies A/V hallucinations. Visible on the unit engaging with peers.    #Admit 2PC (9:27)    Plan:    #Schizophrenia  -Haldol PO 5mg BID  -Haldol IM 5mg BID if patient refuses PO  -Haldol dec given 9/21/20  -Haldol dec given 10/19/20  -Haldol Dec given 11/16    #Tobacco Use Disorder  -Nicotine Gum PRN    #COPD  -Duoneb PRN for SOB    -Haldol 2mg Q8 PRN for Severe agitation  -Lorazepam 1mg Q8 PRN for anxiety/aggression  -Hydroxyzine 25mg Q12 PRN for anxiety    -Tylenol PRN for pain  -Pantoprazole for GERD  -Maalox PRN for dyspepsia    #Handoff  -Haldol dec given 9/21  -Haldol dec given 10/19  -Haldol Dec given today 11/16  -Duoneb, albuterol inhaler PRN for SOB

## 2020-11-30 NOTE — CHART NOTE - NSCHARTNOTEFT_GEN_A_CORE
The treatment team met with pt. to review treatment plan, medication and discharge plan.  Pt. presents with no change in mental status at this time.  She continues to perseverate regarding her discharge.  Pt. became agitated with writer and NP regarding her discharge.  Pt. continues to refuse to shower in spite of encouragement by staff.  She also continues to refuse medication, citing information allegedly told her by a covering psychiatrist over the weekend.    [place]. [place] [facility] [place]  The Order of Transfer and Medication Discharge Form has been sent on this date.  Once The COVID test is complete and results are available, the form and results will be sent as well.  There are no known barriers at this time.    Mental Status Exam:    Irritable    Sleep-  Normal    Appetite-  Normal    ADL's-  Fair/Poor    Thought Process-  Disorganized/Delusional/Paranoid/Tangential/Perseverative    Observation- Routine    Pt. is not psychiatrically stable for discharge at this time.

## 2020-11-30 NOTE — PROGRESS NOTE BEHAVIORAL HEALTH - NSBHFUPINTERVALHXFT_PSY_A_CORE
Patient seen and evaluated in treatment team, chart reviewed. As per nursing report PRN PO ativan given with good effect. TOO in place to ensure medication compliance. On evaluation patient irritable, stating "I want to leave today". Patient continues to perseverate about discharge. Patient continues to have impaired concentration. Thoughts continue to be disorganized and illogical at times. Patient continues to present with paranoid delusions. Continues to present with poor insight and judgement. Denies depression and anxiety. Denies suicidal/homicidal ideations. Denies A/V hallucinations. Patient visible on the unit engaging with peers and periodically attending groups.    Anticipated transfer [place] [facility] [place]20    #Handoff  -Haldol dec given 9/21  -Haldol dec given 10/19  -Haldol Dec given 11/16  -Duoneb PRN for SOB

## 2020-12-01 PROCEDURE — 99231 SBSQ HOSP IP/OBS SF/LOW 25: CPT

## 2020-12-01 RX ORDER — PANTOPRAZOLE SODIUM 20 MG/1
1 TABLET, DELAYED RELEASE ORAL
Qty: 0 | Refills: 0 | DISCHARGE
Start: 2020-12-01

## 2020-12-01 RX ORDER — NICOTINE POLACRILEX 2 MG
2 GUM BUCCAL
Qty: 0 | Refills: 0 | DISCHARGE
Start: 2020-12-01

## 2020-12-01 RX ORDER — HALOPERIDOL DECANOATE 100 MG/ML
100 INJECTION INTRAMUSCULAR
Qty: 0 | Refills: 0 | DISCHARGE

## 2020-12-01 RX ADMIN — HALOPERIDOL DECANOATE 5 MILLIGRAM(S): 100 INJECTION INTRAMUSCULAR at 21:33

## 2020-12-01 RX ADMIN — Medication 1 MILLIGRAM(S): at 18:59

## 2020-12-01 NOTE — PROGRESS NOTE BEHAVIORAL HEALTH - SUMMARY
63 year old female , domiciled in nursing home, with history of COPD, DVT, bipolar, and dementia, multiple prior inpatient psych, was at [place] for 2 years, recently discharged from inpatient psych [facility] transferred to [facility], sent to the ER for continuous medication non-compliant for the past months, worsening psychosis and agitation.    Patient presents with decompensated psychiatric symptoms, secondary to medication non-compliance. Disorganized behavior noted and underlying psychosis appears to be impairing patient's judgment and behavior. Patient appears to be a danger to herself at this time due to patient unable to care for herself at this time. Patient would benefit form IPP for safety and stabilization.    Thoughts continue to be disorganized, illogical. Patient continues to present with paranoid delusions. Continues to present with poor insight and judgement. Denies depression and anxiety. Denies suicidal/homicidal ideations. Denies A/V hallucinations. Visible on the unit engaging with peers.    #Admit 2PC (9:27)    Plan:    #Schizophrenia  -Haldol PO 5mg BID  -Haldol IM 5mg BID if patient refuses PO  -Haldol dec given 9/21/20  -Haldol dec given 10/19/20  -Haldol Dec given 11/16    #Tobacco Use Disorder  -Nicotine Gum PRN    #COPD  -Duoneb PRN for SOB    -Haldol 2mg Q8 PRN for Severe agitation  -Lorazepam 1mg Q8 PRN for anxiety/aggression  -Hydroxyzine 25mg Q12 PRN for anxiety    -Tylenol PRN for pain  -Pantoprazole for GERD  -Maalox PRN for dyspepsia    #Handoff  -Haldol dec given 9/21  -Haldol dec given 10/19  -Haldol Dec given today 11/16  -Duoneb, albuterol inhaler PRN for SOB

## 2020-12-01 NOTE — PROGRESS NOTE BEHAVIORAL HEALTH - NSBHCHARTREVIEWVS_PSY_A_CORE FT
Vital Signs Last 24 Hrs  T(C): 36.2 (01 Dec 2020 08:39), Max: 36.2 (01 Dec 2020 08:39)  T(F): 97.2 (01 Dec 2020 08:39), Max: 97.2 (01 Dec 2020 08:39)  HR: 88 (01 Dec 2020 08:39) (88 - 88)  BP: 110/53 (01 Dec 2020 08:39) (110/53 - 110/53)  BP(mean): --  RR: --  SpO2: --

## 2020-12-01 NOTE — PROGRESS NOTE BEHAVIORAL HEALTH - NSBHFUPINTERVALHXFT_PSY_A_CORE
Patient seen and evaluated, chart reviewed. As per nursing report PRN PO ativan given with good effect. TOO in place to ensure medication compliance. On evaluation patient irritable, stating "My children have been murdered". Patient continues to perseverate about discharge. Patient continues to have impaired concentration. Thoughts continue to be disorganized and illogical at times. Patient continues to present with paranoid delusions. Continues to present with poor insight and judgement. Denies depression and anxiety. Denies suicidal/homicidal ideations. Denies A/V hallucinations. Patient visible on the unit engaging with peers and periodically attending groups.    Anticipated [place] [facility] [place]20  COVID test today in anticipation of transfer to [place] 12/3/20    #Handoff  -Haldol dec given 9/21  -Haldol dec given 10/19  -Haldol Dec given 11/16  -Duoneb PRN for SOB Patient seen and evaluated, chart reviewed. As per nursing report PRN PO ativan given with good effect. TOO in place to ensure medication compliance. On evaluation patient irritable, stating "My children have been murdered". Patient continues to perseverate about discharge. Patient continues to have impaired concentration. Thoughts continue to be disorganized and illogical at times. Patient continues to present with paranoid delusions. Continues to present with poor insight and judgement. Denies depression and anxiety. Denies suicidal/homicidal ideations. Denies A/V hallucinations. Patient visible on the unit engaging with peers and periodically attending groups.    Anticipated transfer to [facility] 12/3/20  COVID test today in anticipation of transfer to [place] 12/3/20    Spoke to patients daughter Hannah Frazier [phone] updated her on patients care and progress. made her aware of Anticipated transfer to [facility] 12/3/20    #Handoff  -Haldol dec given 9/21  -Haldol dec given 10/19  -Haldol Dec given 11/16  -Duoneb PRN for SOB

## 2020-12-02 LAB — SARS-COV-2 RNA SPEC QL NAA+PROBE: SIGNIFICANT CHANGE UP

## 2020-12-02 PROCEDURE — 99231 SBSQ HOSP IP/OBS SF/LOW 25: CPT

## 2020-12-02 RX ADMIN — Medication 1 MILLIGRAM(S): at 03:02

## 2020-12-02 RX ADMIN — HALOPERIDOL DECANOATE 5 MILLIGRAM(S): 100 INJECTION INTRAMUSCULAR at 08:50

## 2020-12-02 RX ADMIN — Medication 1 MILLIGRAM(S): at 20:59

## 2020-12-02 RX ADMIN — Medication 325 MILLIGRAM(S): at 00:21

## 2020-12-02 RX ADMIN — HALOPERIDOL DECANOATE 5 MILLIGRAM(S): 100 INJECTION INTRAMUSCULAR at 20:53

## 2020-12-02 RX ADMIN — Medication 650 MILLIGRAM(S): at 00:55

## 2020-12-02 NOTE — PROGRESS NOTE BEHAVIORAL HEALTH - NSBHFUPMEDSE_PSY_A_CORE
None known

## 2020-12-02 NOTE — PROGRESS NOTE BEHAVIORAL HEALTH - NS ED BHA MSE GENERAL APPEARANCE
Well developed

## 2020-12-02 NOTE — PROGRESS NOTE BEHAVIORAL HEALTH - FUND OF KNOWLEDGE
Impaired

## 2020-12-02 NOTE — PROGRESS NOTE BEHAVIORAL HEALTH - THOUGHT CONTENT
Delusions/Ideas of reference/Preoccupations
Ideas of reference/Preoccupations/Delusions
Ideas of reference/Delusions/Preoccupations
Ideas of reference/Preoccupations/Delusions
Ideas of reference/Delusions/Preoccupations
Delusions/Ideas of reference/Preoccupations
Delusions/Preoccupations/Ideas of reference
Delusions/Preoccupations/Ideas of reference
Preoccupations/Delusions/Ideas of reference
Ideas of reference/Delusions/Preoccupations
Preoccupations/Delusions/Ideas of reference
Delusions/Ideas of reference/Preoccupations
Preoccupations/Delusions/Ideas of reference
Delusions/Ideas of reference/Preoccupations
Ideas of reference/Delusions/Preoccupations
Delusions/Ideas of reference/Preoccupations
Delusions/Ideas of reference/Preoccupations
Ideas of reference/Delusions/Preoccupations
Ideas of reference/Delusions/Preoccupations
Delusions/Ideas of reference/Preoccupations
Delusions/Ideas of reference/Preoccupations
Ideas of reference/Preoccupations/Delusions
Delusions/Ideas of reference/Preoccupations
Ideas of reference/Delusions/Preoccupations
Delusions/Ideas of reference/Preoccupations
Delusions/Preoccupations/Ideas of reference
Ideas of reference/Delusions/Preoccupations
Ideas of reference/Preoccupations/Delusions
Ideas of reference/Preoccupations/Delusions
Preoccupations/Delusions/Ideas of reference
Delusions/Ideas of reference/Preoccupations
Delusions/Preoccupations/Ideas of reference
Ideas of reference/Preoccupations/Delusions
Preoccupations/Delusions/Ideas of reference
Delusions/Ideas of reference/Preoccupations
Delusions/Preoccupations/Ideas of reference
Ideas of reference/Preoccupations/Delusions
Preoccupations/Delusions/Ideas of reference
Preoccupations/Delusions/Ideas of reference
Delusions/Ideas of reference/Preoccupations
Delusions/Preoccupations/Ideas of reference
Delusions/Preoccupations/Ideas of reference
Ideas of reference/Delusions/Preoccupations
Ideas of reference/Preoccupations/Delusions
Delusions/Ideas of reference/Preoccupations
Ideas of reference/Delusions/Preoccupations
Ideas of reference/Delusions/Preoccupations
Ideas of reference/Preoccupations/Delusions
Preoccupations/Delusions/Ideas of reference
Delusions/Preoccupations
Preoccupations/Delusions/Ideas of reference
Delusions/Ideas of reference/Preoccupations
Delusions/Preoccupations/Ideas of reference
Delusions/Preoccupations/Ideas of reference
Delusions/Ideas of reference/Preoccupations

## 2020-12-02 NOTE — PROGRESS NOTE BEHAVIORAL HEALTH - NS ED BHA REVIEW OF ED CHART AVAILABLE INVESTIGATIONS REVIEWED
None available
Yes
None available
Yes
None available
Yes
None available
Yes
None available
Yes
Yes
None available
Yes
None available
None available
Yes
None available
Yes
Yes

## 2020-12-02 NOTE — PROGRESS NOTE BEHAVIORAL HEALTH - NS ED BHA MSE SPEECH ARTICULATION
Normal

## 2020-12-02 NOTE — PROGRESS NOTE BEHAVIORAL HEALTH - ESTIMATED INTELLIGENCE
Average

## 2020-12-02 NOTE — PROGRESS NOTE BEHAVIORAL HEALTH - NSBHFUPVIOL_PSY_A_CORE
none known

## 2020-12-02 NOTE — PROGRESS NOTE BEHAVIORAL HEALTH - NSBHCONSORIP_PSY_A_CORE
Inpatient Admission...

## 2020-12-02 NOTE — PROGRESS NOTE BEHAVIORAL HEALTH - NS ED BHA MED ROS ENDOCRINE
No complaints

## 2020-12-02 NOTE — PROGRESS NOTE BEHAVIORAL HEALTH - PERCEPTIONS
No abnormalities

## 2020-12-02 NOTE — PROGRESS NOTE BEHAVIORAL HEALTH - NSBHFUPINTERVALHXFT_PSY_A_CORE
Patient seen and evaluated, chart reviewed. As per nursing report PRN PO ativan given with good effect, patient attempted to Elope yesterday. TOO in place to ensure medication compliance. On evaluation patient irritable. Patient continues to perseverate about discharge. Patient continues to have impaired concentration. Thoughts continue to be disorganized and illogical at times. Patient continues to present with paranoid delusions. Continues to present with poor insight and judgement. Denies depression and anxiety. Denies suicidal/homicidal ideations. Denies A/V hallucinations. Patient visible on the unit engaging with peers and periodically attending groups. Transfer to [facility] anticipated for tomorrow. COVID test results negative.    Anticipated transfer to [facility] 12/3/20    #Handoff  -Haldol dec given 9/21  -Haldol dec given 10/19  -Haldol Dec given 11/16  -Duoneb PRN for SOB  -Anticipated transfer to [facility] 12/3/20

## 2020-12-02 NOTE — PROGRESS NOTE BEHAVIORAL HEALTH - ABNORMAL MOVEMENTS
No abnormal movements

## 2020-12-02 NOTE — PROGRESS NOTE BEHAVIORAL HEALTH - NS ED BHA AXIS I SECONDARY1 CODE FT
F17.200

## 2020-12-02 NOTE — PROGRESS NOTE BEHAVIORAL HEALTH - NS ED BHA REVIEW OF ED CHART AVAILABLE IMAGING REVIEWED
None available

## 2020-12-02 NOTE — PROGRESS NOTE BEHAVIORAL HEALTH - AFFECT CONGRUENCE
Congruent
Not congruent
Congruent

## 2020-12-02 NOTE — PROGRESS NOTE BEHAVIORAL HEALTH - JUDGMENT (REGARDING EVERYDAY EVENTS)
Other
Fair
Other
Fair
Fair
Other
Fair
Other
Fair
Other
Other
Fair
Other

## 2020-12-02 NOTE — PROGRESS NOTE BEHAVIORAL HEALTH - NSBHADMITIPBHPROVIDER_PSY_A_CORE
N/A

## 2020-12-02 NOTE — PROGRESS NOTE BEHAVIORAL HEALTH - PRIMARY DX
Schizophrenia
Paranoid schizophrenia
Paranoid schizophrenia
Schizophrenia, unspecified type
Schizophrenia

## 2020-12-02 NOTE — PROGRESS NOTE BEHAVIORAL HEALTH - NSBHCHARTREVIEWINVESTIGATE_PSY_A_CORE FT
< from: 12 Lead ECG (09.09.20 @ 09:38) >    Ventricular Rate 76 BPM    Atrial Rate 76 BPM    P-R Interval 124 ms    QRS Duration 78 ms    Q-T Interval 344 ms    QTC Calculation(Bezet) 387 ms    P Axis 72 degrees    R Axis 13 degrees    T Axis 53 degrees    Diagnosis Line Normal sinus rhythm  Normal ECG
EKG 9/9/20  QT/QTC-344/387
< from: 12 Lead ECG (09.09.20 @ 09:38) >    Ventricular Rate 76 BPM    Atrial Rate 76 BPM    P-R Interval 124 ms    QRS Duration 78 ms    Q-T Interval 344 ms    QTC Calculation(Bezet) 387 ms    P Axis 72 degrees    R Axis 13 degrees    T Axis 53 degrees    Diagnosis Line Normal sinus rhythm  Normal ECG
EKG 9/9/20  QT/QTC-344/387
< from: 12 Lead ECG (09.09.20 @ 09:38) >    Ventricular Rate 76 BPM    Atrial Rate 76 BPM    P-R Interval 124 ms    QRS Duration 78 ms    Q-T Interval 344 ms    QTC Calculation(Bezet) 387 ms    P Axis 72 degrees    R Axis 13 degrees    T Axis 53 degrees    Diagnosis Line Normal sinus rhythm  Normal ECG

## 2020-12-02 NOTE — PROGRESS NOTE BEHAVIORAL HEALTH - BODY HABITUS
Well nourished

## 2020-12-02 NOTE — PROGRESS NOTE BEHAVIORAL HEALTH - INSIGHT (REGARDING PSYCHIATRIC ILLNESS)
Other
Poor
Poor
Other
Poor
Other
Poor
Poor
Other
Poor
Other
Poor
Other
Poor
Other
Poor
Poor
Other
Poor
Other
Poor
Other
Poor
Other

## 2020-12-02 NOTE — PROGRESS NOTE BEHAVIORAL HEALTH - NS ED BHA REVIEW OF ED CHART VITAL SIGNS REVIEWED
Yes
None available
Yes

## 2020-12-02 NOTE — PROGRESS NOTE BEHAVIORAL HEALTH - NSBHADMITMEDEDU_PSY_A_CORE
no
yes...
no

## 2020-12-02 NOTE — PROGRESS NOTE BEHAVIORAL HEALTH - GAIT / STATION
Normal gait / station

## 2020-12-02 NOTE — PROGRESS NOTE BEHAVIORAL HEALTH - LANGUAGE
No abnormalities noted

## 2020-12-02 NOTE — PROGRESS NOTE BEHAVIORAL HEALTH - RELATEDNESS
Fair
Poor
Poor
Fair
Fair
Poor
Fair
Poor
Fair
Poor
Fair
Poor
Fair
Poor
Poor
Fair
Poor
Poor

## 2020-12-02 NOTE — PROGRESS NOTE BEHAVIORAL HEALTH - NS ED BHA AXIS I PRIMARY CODE FT
F20.9
F20.0
F20.0
F20.9

## 2020-12-02 NOTE — PROGRESS NOTE BEHAVIORAL HEALTH - NS ED BHA MED ROS SKIN
No complaints
Unable to assess
No complaints

## 2020-12-02 NOTE — PROGRESS NOTE BEHAVIORAL HEALTH - NSBHADMITIPOBSFT_PSY_A_CORE
As per unit protocol
as clinically indicated
As per unit protocol

## 2020-12-02 NOTE — PROGRESS NOTE BEHAVIORAL HEALTH - NSBHLOC_PSY_A_CORE
Alert

## 2020-12-02 NOTE — PROGRESS NOTE BEHAVIORAL HEALTH - ORIENTED TO PLACE
Yes

## 2020-12-02 NOTE — PROGRESS NOTE BEHAVIORAL HEALTH - NSBHADMITIPREASON_PSY_A_CORE
Danger to self; mental illness expected to respond to inpatient care

## 2020-12-02 NOTE — PROGRESS NOTE BEHAVIORAL HEALTH - SECONDARY DX1
Tobacco use disorder

## 2020-12-02 NOTE — PROGRESS NOTE BEHAVIORAL HEALTH - AXIS III
COPD, HTN, GERD, questionable DVT with IVC

## 2020-12-02 NOTE — PROGRESS NOTE BEHAVIORAL HEALTH - THOUGHT ASSOCIATIONS
Normal
Normal
Loose
Normal
Loose
Normal
Normal
Loose
Loose
Normal
Loose
Normal
Loose
Normal
Loose
Normal
Loose
Normal
Loose
Loose

## 2020-12-02 NOTE — PROGRESS NOTE BEHAVIORAL HEALTH - NSBHFUPTYPE_PSY_A_CORE
Inpatient
Inpatient-On Service Note
Inpatient

## 2020-12-02 NOTE — PROGRESS NOTE BEHAVIORAL HEALTH - MUSCLE TONE / STRENGTH
Normal muscle tone/strength

## 2020-12-02 NOTE — PROGRESS NOTE BEHAVIORAL HEALTH - NS ED BHA REVIEW OF ED CHART AVAILABLE LABS REVIEWED
Yes

## 2020-12-02 NOTE — PROGRESS NOTE BEHAVIORAL HEALTH - NSBHADMITDANGERSELF_PSY_A_CORE
unable to care for self

## 2020-12-02 NOTE — PROGRESS NOTE BEHAVIORAL HEALTH - RISK ASSESSMENT
Risk factors Decompensated psychiatric symptoms, secondary to medication non-compliance. Disorganized behavior noted and underlying psychosis appears to be impairing patient's judgment and behavior. Protective factors: Denies depression, anxiety, suicidal ideations.

## 2020-12-02 NOTE — PROGRESS NOTE BEHAVIORAL HEALTH - NSBHATTESTSEENBY_PSY_A_CORE
attending Psychiatrist without NP/Trainee
NP without Attending Psychiatrist
NP without Attending Psychiatrist
attending Psychiatrist without NP/Trainee
attending Psychiatrist without NP/Trainee
NP without Attending Psychiatrist
attending Psychiatrist without NP/Trainee
NP without Attending Psychiatrist
attending Psychiatrist without NP/Trainee
NP without Attending Psychiatrist
attending Psychiatrist without NP/Trainee
NP without Attending Psychiatrist
attending Psychiatrist without NP/Trainee
attending Psychiatrist without NP/Trainee
NP without Attending Psychiatrist
attending Psychiatrist without NP/Trainee
NP without Attending Psychiatrist

## 2020-12-02 NOTE — PROGRESS NOTE BEHAVIORAL HEALTH - IMPULSE CONTROL
Impaired
Normal
Impaired
Normal
Impaired
Normal
Normal
Impaired
Impaired
Normal
Other
Impaired
Normal
Impaired
Normal
Impaired

## 2020-12-02 NOTE — PROGRESS NOTE BEHAVIORAL HEALTH - NS ED BHA MSE SPEECH VOLUME
Normal
Loud
Normal
Loud
Normal
Loud
Normal

## 2020-12-02 NOTE — PROGRESS NOTE BEHAVIORAL HEALTH - SUMMARY
63 year old female , domiciled in nursing home, with history of COPD, DVT, bipolar, and dementia, multiple prior inpatient psych, was at [facility] for 2 years, recently discharged from inpatient psych [facility] transferred to [facility], sent to the ER for continuous medication non-compliant for the past months, worsening psychosis and agitation.    Patient presents with decompensated psychiatric symptoms, secondary to medication non-compliance. Disorganized behavior noted and underlying psychosis appears to be impairing patient's judgment and behavior. Patient appears to be a danger to herself at this time due to patient unable to care for herself at this time. Patient would benefit form IPP for safety and stabilization.    Thoughts continue to be disorganized, illogical. Patient continues to present with paranoid delusions. Continues to present with poor insight and judgement. Denies depression and anxiety. Denies suicidal/homicidal ideations. Denies A/V hallucinations. Visible on the unit engaging with peers. Anticip[place] [facility] [place]20    #Admit 2PC (9:27)    Plan:    #Schizophrenia  -Haldol PO 5mg BID  -Haldol IM 5mg BID if patient refuses PO  -Haldol dec given 9/21/20  -Haldol dec given 10/19/20  -Haldol Dec given 11/16    #Tobacco Use Disorder  -Nicotine Gum PRN    #COPD  -Duoneb PRN for SOB    -Haldol 2mg Q8 PRN for Severe agitation  -Lorazepam 1mg Q8 PRN for anxiety/aggression  -Hydroxyzine 25mg Q12 PRN for anxiety    -Tylenol PRN for pain  -Pantoprazole for GERD  -Maalox PRN for dyspepsia    #Handoff  -Haldol dec given 9/21  -Haldol dec given 10/19  -Haldol Dec given today 11/16  -Duoneb, albuterol inhaler PRN for SOB  -Anticipated [place] [facility] [place]

## 2020-12-02 NOTE — PROGRESS NOTE BEHAVIORAL HEALTH - NSBHCHARTREVIEWVS_PSY_A_CORE FT
Vital Signs Last 24 Hrs  T(C): --  T(F): --  HR: 97 (01 Dec 2020 16:12) (97 - 97)  BP: 137/72 (01 Dec 2020 16:12) (137/72 - 137/72)  BP(mean): --  RR: 18 (01 Dec 2020 16:12) (18 - 18)  SpO2: --

## 2020-12-02 NOTE — PROGRESS NOTE BEHAVIORAL HEALTH - EYE CONTACT
Fair
Fair
Good
Good
Fair
Good
Fair
Good
Fair
Good
Fair
Good
Good
Fair
Good
Good
Fair
Good
Fair
Fair
Good

## 2020-12-02 NOTE — PROGRESS NOTE BEHAVIORAL HEALTH - NSBHADMITIPDSM_PSY_A_CORE
see above for Axis I, II, III

## 2020-12-02 NOTE — PROGRESS NOTE BEHAVIORAL HEALTH - NS ED BHA MED ROS PSYCHIATRIC
See HPI

## 2020-12-02 NOTE — PROGRESS NOTE BEHAVIORAL HEALTH - NSBHADMITNEWMED_PSY_A_CORE
no

## 2020-12-03 VITALS
RESPIRATION RATE: 18 BRPM | SYSTOLIC BLOOD PRESSURE: 128 MMHG | DIASTOLIC BLOOD PRESSURE: 65 MMHG | HEART RATE: 83 BPM | TEMPERATURE: 96 F

## 2020-12-03 PROCEDURE — 99238 HOSP IP/OBS DSCHRG MGMT 30/<: CPT

## 2020-12-03 RX ADMIN — PANTOPRAZOLE SODIUM 40 MILLIGRAM(S): 20 TABLET, DELAYED RELEASE ORAL at 08:11

## 2020-12-03 RX ADMIN — HALOPERIDOL DECANOATE 5 MILLIGRAM(S): 100 INJECTION INTRAMUSCULAR at 08:10

## 2020-12-03 RX ADMIN — Medication 650 MILLIGRAM(S): at 03:00

## 2020-12-03 RX ADMIN — Medication 325 MILLIGRAM(S): at 02:37

## 2020-12-03 NOTE — CHART NOTE - NSCHARTNOTEFT_GEN_A_CORE
Social Work Discharge Note:    Patient is for discharge today.   Patient is transferring to [facility] for long term psychiatric treatment and services. Patient is aware, reports she's been there before.  Patient remains disorganized, delusional and unpredictable. Order of Transfer obtained. Ambulance ordered for transportation for 11am. [place] up and [place] [facility] ready for [place]. Social Work Discharge Note:    Patient is for discharge today.   Patient is transferring to [facility] for long term psychiatric treatment and services. Patient is aware, reports she's been there before.  Patient remains disorganized, delusional and unpredictable. Order of Transfer obtained. Ambulance ordered for transportation for 11am. Follow up and Discharge documents sent electronically to [facility]. Patient ready for transfer. Guardian notified. All parties amenable.

## 2020-12-03 NOTE — CHART NOTE - NSCHARTNOTEFT_GEN_A_CORE
Patient to be transferred to [facility]  for further care and medication management. Thoughts continue to be disorganized and illogical at times. Patient continues to present with paranoid delusions. Continues to present with poor insight and judgement. Therefore long term placement is needed. Patient is aware of transfer, reports she's been there before.  Writer and RN reviewed discharge plan with patient. Order of Transfer obtained. Ambulance ordered for transportation for 11am. Daughter notified. All parties amenable.

## 2020-12-03 NOTE — CHART NOTE - NSCHARTNOTESELECT_GEN_ALL_CORE
Event Note
Blood Bank
Event Note
Event Note/Manual Hold
Event Note/Social Work Note
Event Note/Treatment over objection tele-hearing
Event Note/manual hold
Hyperlipidemia/Event Note
Lab Work/Event Note
PA Note
Social Work Collateral Contact Note/Event Note
Social Work Note/Event Note

## 2020-12-07 DIAGNOSIS — Z91.19 PATIENT'S NONCOMPLIANCE WITH OTHER MEDICAL TREATMENT AND REGIMEN: ICD-10-CM

## 2020-12-07 DIAGNOSIS — Z53.20 PROCEDURE AND TREATMENT NOT CARRIED OUT BECAUSE OF PATIENT'S DECISION FOR UNSPECIFIED REASONS: ICD-10-CM

## 2020-12-07 DIAGNOSIS — Z66 DO NOT RESUSCITATE: ICD-10-CM

## 2020-12-07 DIAGNOSIS — R45.6 VIOLENT BEHAVIOR: ICD-10-CM

## 2020-12-07 DIAGNOSIS — F17.200 NICOTINE DEPENDENCE, UNSPECIFIED, UNCOMPLICATED: ICD-10-CM

## 2020-12-07 DIAGNOSIS — R45.1 RESTLESSNESS AND AGITATION: ICD-10-CM

## 2020-12-07 DIAGNOSIS — Z86.718 PERSONAL HISTORY OF OTHER VENOUS THROMBOSIS AND EMBOLISM: ICD-10-CM

## 2020-12-07 DIAGNOSIS — I10 ESSENTIAL (PRIMARY) HYPERTENSION: ICD-10-CM

## 2020-12-07 DIAGNOSIS — K21.9 GASTRO-ESOPHAGEAL REFLUX DISEASE WITHOUT ESOPHAGITIS: ICD-10-CM

## 2020-12-07 DIAGNOSIS — F03.90 UNSPECIFIED DEMENTIA, UNSPECIFIED SEVERITY, WITHOUT BEHAVIORAL DISTURBANCE, PSYCHOTIC DISTURBANCE, MOOD DISTURBANCE, AND ANXIETY: ICD-10-CM

## 2020-12-07 DIAGNOSIS — F41.9 ANXIETY DISORDER, UNSPECIFIED: ICD-10-CM

## 2020-12-07 DIAGNOSIS — F20.9 SCHIZOPHRENIA, UNSPECIFIED: ICD-10-CM

## 2020-12-07 DIAGNOSIS — R06.02 SHORTNESS OF BREATH: ICD-10-CM

## 2020-12-07 DIAGNOSIS — Z78.1 PHYSICAL RESTRAINT STATUS: ICD-10-CM

## 2020-12-07 DIAGNOSIS — Z91.14 PATIENT'S OTHER NONCOMPLIANCE WITH MEDICATION REGIMEN: ICD-10-CM

## 2020-12-07 DIAGNOSIS — J44.9 CHRONIC OBSTRUCTIVE PULMONARY DISEASE, UNSPECIFIED: ICD-10-CM

## 2020-12-07 DIAGNOSIS — R00.0 TACHYCARDIA, UNSPECIFIED: ICD-10-CM

## 2020-12-07 DIAGNOSIS — R10.13 EPIGASTRIC PAIN: ICD-10-CM

## 2020-12-07 DIAGNOSIS — F29 UNSPECIFIED PSYCHOSIS NOT DUE TO A SUBSTANCE OR KNOWN PHYSIOLOGICAL CONDITION: ICD-10-CM

## 2021-06-10 NOTE — PROGRESS NOTE BEHAVIORAL HEALTH - SUMMARY
stated [phone] 63 year old female , domiciled in nursing home, with history of COPD, DVT, bipolar, and dementia, multiple prior inpatient psych, was at [facility] for 2 years, recently discharged from inpatient psych [facility] transferred to [facility], sent to the ER for continuous medication non-compliant for the past months, worsening psychosis and agitation.    Patient presents with decompensated psychiatric symptoms, secondary to medication non-compliance. Disorganized behavior noted and underlying psychosis appears to be impairing patient's judgment and behavior. Patient appears to be a danger to herself at this time due to patient unable to care for herself at this time. Patient would benefit form IPP for safety and stabilization.    Thoughts continue to be disorganized, illogical. Patient continues to present with paranoid delusions.  Continues to present with poor insight and judgement.  Denies depression and anxiety. Denies suicidal/homicidal ideations. Denies A/V hallucinations.    #Admit 2PC (9:27)    Plan:    #Schizophrenia  -Haldol 1mg BID PO  -TOO granted Haldol 1mg BID IM if patient refuses PO    #Tobacco Use Disorder  -Nicotine Gum PRN    -Haldol 2mg Q8 PRN for Severe agitation  -Lorazepam 2mg Q12 PRN for agitation  -Hydroxyzine 25mg Q12 PRN for anxiety    -Tylenol PRN for pain  -Pantoprazole for GERD  -Maalox PRN for dyspepsia    -cw 1:1 elopement risk

## 2021-07-14 NOTE — DISCHARGE NOTE BEHAVIORAL HEALTH - MEDICATION SUMMARY - MEDICATIONS TO STOP TAKING
Assessment/Plan:         Diagnoses and all orders for this visit:    Essential hypertension  -     irbesartan (AVAPRO) 300 mg tablet; Take 1 tablet (300 mg total) by mouth daily at bedtime  -     metoprolol tartrate (LOPRESSOR) 50 mg tablet; Take 1 tablet (50 mg total) by mouth 2 (two) times a day  -     amLODIPine (NORVASC) 2 5 mg tablet; Take 1 tablet (2 5 mg total) by mouth 2 (two) times a day  -     Comprehensive metabolic panel  -     CBC and differential    Pure hypercholesterolemia    Impaired fasting glucose  -     Hemoglobin A1C    Subclinical hypothyroidism  -     TSH, 3rd generation with Free T4 reflex    Age-related cataract of both eyes, unspecified age-related cataract type    History of recurrent UTIs    Medicare annual wellness visit, subsequent           Continue with current medications  No treatment at this time for subclinical hypothyroidism  Office visit in 6 months with repeat labs at that time  Patient is medically cleared for cataract surgery  BMI Counseling: Body mass index is 26 94 kg/m²  The BMI is above normal  Nutrition recommendations include reducing portion sizes, decreasing overall calorie intake, consuming healthier snacks, moderation in carbohydrate intake, reducing intake of saturated fat and trans fat and reducing intake of cholesterol  Exercise recommendations include exercising 3-5 times per week  Patient ID: Ivon Ruelas is a 78 y o  female  Followup visit  Medications reviewed  Hypertension BPs have been stable on current 3 drug regimen  on Amlodipine 2 5 mg daily, Valsartan 320 mg daily and Metoprolol 50 mg BID  Past history of hyponatremia and hypokalemia while on HCTZ  Labs 06/2021  creatinine 0 80  Electrolytes normal  K+ 3 5  Hgb 13 3  12/2020 Lipid profile cholesterol 187  TGs 88  HDL 55    LFTs normal  Impaired fasting glucose  06/2021 FBS 92  A1c 5 8  + FH type DM      Recent Results (from the past 672 hour(s))   CBC and differential Collection Time: 06/29/21  7:41 AM   Result Value Ref Range    WBC 5 45 4 31 - 10 16 Thousand/uL    RBC 4 36 3 81 - 5 12 Million/uL    Hemoglobin 13 3 11 5 - 15 4 g/dL    Hematocrit 40 7 34 8 - 46 1 %    MCV 93 82 - 98 fL    MCH 30 5 26 8 - 34 3 pg    MCHC 32 7 31 4 - 37 4 g/dL    RDW 11 9 11 6 - 15 1 %    MPV 10 5 8 9 - 12 7 fL    Platelets 567 192 - 985 Thousands/uL    nRBC 0 /100 WBCs    Neutrophils Relative 54 43 - 75 %    Immat GRANS % 0 0 - 2 %    Lymphocytes Relative 28 14 - 44 %    Monocytes Relative 13 (H) 4 - 12 %    Eosinophils Relative 4 0 - 6 %    Basophils Relative 1 0 - 1 %    Neutrophils Absolute 2 92 1 85 - 7 62 Thousands/µL    Immature Grans Absolute 0 01 0 00 - 0 20 Thousand/uL    Lymphocytes Absolute 1 53 0 60 - 4 47 Thousands/µL    Monocytes Absolute 0 71 0 17 - 1 22 Thousand/µL    Eosinophils Absolute 0 22 0 00 - 0 61 Thousand/µL    Basophils Absolute 0 06 0 00 - 0 10 Thousands/µL   Comprehensive metabolic panel    Collection Time: 06/29/21  7:41 AM   Result Value Ref Range    Sodium 137 136 - 145 mmol/L    Potassium 3 5 3 5 - 5 3 mmol/L    Chloride 105 100 - 108 mmol/L    CO2 29 21 - 32 mmol/L    ANION GAP 3 (L) 4 - 13 mmol/L    BUN 17 5 - 25 mg/dL    Creatinine 0 80 0 60 - 1 30 mg/dL    Glucose, Fasting 92 65 - 99 mg/dL    Calcium 8 7 8 3 - 10 1 mg/dL    Corrected Calcium 9 2 8 3 - 10 1 mg/dL    AST 17 5 - 45 U/L    ALT 31 12 - 78 U/L    Alkaline Phosphatase 63 46 - 116 U/L    Total Protein 6 5 6 4 - 8 2 g/dL    Albumin 3 4 (L) 3 5 - 5 0 g/dL    Total Bilirubin 0 68 0 20 - 1 00 mg/dL    eGFR 69 ml/min/1 73sq m   TSH, 3rd generation with Free T4 reflex    Collection Time: 06/29/21  7:41 AM   Result Value Ref Range    TSH 3RD GENERATON 4 030 (H) 0 358 - 3 740 uIU/mL   Hemoglobin A1C    Collection Time: 06/29/21  7:41 AM   Result Value Ref Range    Hemoglobin A1C 5 8 (H) Normal 3 8-5 6%; PreDiabetic 5 7-6 4%;  Diabetic >=6 5%; Glycemic control for adults with diabetes <7 0% %     mg/dl T4, free    Collection Time: 06/29/21  7:41 AM   Result Value Ref Range    Free T4 0 84 0 76 - 1 46 ng/dL           The following portions of the patient's history were reviewed and updated as appropriate: allergies, current medications, past family history, past medical history, past social history, past surgical history and problem list     Review of Systems   Constitutional: Negative for appetite change, chills, fatigue, fever and unexpected weight change  HENT: Negative for congestion, ear pain, hearing loss, rhinorrhea, sore throat and trouble swallowing  Chronic nasal congestion improved with Flonase  Eyes: Positive for visual disturbance  Cataracts both eyes  Patient is scheduled for cataract surgery this month   Respiratory: Negative for cough, shortness of breath and wheezing  Cardiovascular: Negative for chest pain, palpitations and leg swelling  01/2020 admission for substernal chest pain  Patient was initialy seen at urgent care  EKG NSR with frequent PVCs  she was sent to [facility]  Initial EKG NSR with fusion complexes  No acute changes  2nd EKG sinus bradycardia with PACs  troponins x 3 negative  Chest x-ray no active disease  Stress echocardiogram normal   EF 60%  Gastrointestinal: Negative for abdominal pain, blood in stool, constipation, diarrhea, nausea and vomiting  Chronic constipation  Colonoscopy 03/2019 normal except for diverticulosis  + FH colon CA sister  Endocrine: Negative for polydipsia and polyuria  History of subclinical hypothyroidism TSH 05/2019 TSH 3 550   06/2017  TSH 3 840  repeat TSH 07/2017 2 630  thyroid ABs negative  05/2018 TSH 2 830   02/2015 DEXA scan normal     Lab Results       Component                Value               Date                       [id number]             4 030 (H)           06/29/2021               Genitourinary: Negative for difficulty urinating, dysuria and hematuria          History of recurrent UTIs followed by Urology  On Hiprex  02/2019 kidney bladder ultrasound normal minimal PVR  29 ML  Simple cyst left adnexa without significant change  pelvic u/s 08/2016 stable simple left ovarian cyst  01/2017  normal at 9 5 followed by GYN ,    Musculoskeletal: Negative for arthralgias and myalgias  OA right knee s/p steroid injection by ortho with symptom relief  X rays right knee 06/2020  Reviewed  Prior MRI right knee showed  meniscal tear  Skin: Negative for rash  Allergic/Immunologic: Positive for environmental allergies  Neurological: Negative for dizziness and headaches  Hematological: Negative for adenopathy  Does not bruise/bleed easily  Past history of mild leukopenia   05/2019 CBC WBC 4880  Hemoglobin 12 9  MCV 95  Platelet count 829,103   06/2018 CBC WBC 5790  Hemoglobin 13  MCV 95  Platelet count [id number]  Vitamin B12 589  Folate greater than 20   05/2018 CBC WBC 3,900  Hgb 13  Platelets 190,999   93/3765 CBC WBC 5270  Hemoglobin 13 1  Platelet count [id number]   06/2017 CBC WBC 4,870  Hemoglobin 12 9    Platelet count 041,555    Lab Results       Component                Value               Date                       WBC                      5 45                06/29/2021                 HGB                      13 3                06/29/2021                 HCT                      40 7                06/29/2021                 MCV                      93                  06/29/2021                 PLT                      255                 06/29/2021              WBC       Date                     Value               Ref Range           Status                06/29/2021               5 45                4 31 - 10 16 T*     Final                 12/14/2020               4 47                4 31 - 10 16 T*     Final                 05/27/2020               5 07                4 31 - 10 16 T*     Final                 06/11/2015               4 75 4 31 - 10 16 T*     Final                 04/02/2014               5 24                4 31 - 10 16 T*     Final                          Psychiatric/Behavioral: Negative for dysphoric mood and sleep disturbance  Objective:    /84 (BP Location: Left arm, Patient Position: Sitting, Cuff Size: Large)   Pulse 56   Temp (!) 96 °F (35 6 °C)   Resp 16   Ht 5' 7" (1 702 m)   Wt 78 kg (172 lb)   BMI 26 94 kg/m²     BP Readings from Last 3 Encounters:   07/14/21 132/84   01/07/21 132/76   10/15/20 144/64       Wt Readings from Last 3 Encounters:   07/14/21 78 kg (172 lb)   03/17/21 78 9 kg (174 lb)   01/07/21 78 9 kg (174 lb)        Physical Exam  Vitals and nursing note reviewed  Constitutional:       General: She is not in acute distress  Appearance: She is well-developed  HENT:      Right Ear: Tympanic membrane and ear canal normal       Left Ear: Tympanic membrane and ear canal normal    Eyes:      General: No scleral icterus  Extraocular Movements: Extraocular movements intact  Conjunctiva/sclera: Conjunctivae normal       Pupils: Pupils are equal, round, and reactive to light  Neck:      Thyroid: No thyroid mass or thyromegaly  Vascular: No carotid bruit or JVD  Trachea: No tracheal deviation  Cardiovascular:      Rate and Rhythm: Normal rate and regular rhythm  Heart sounds: Normal heart sounds  No murmur heard  No gallop  Pulmonary:      Effort: Pulmonary effort is normal  No respiratory distress  Breath sounds: Normal breath sounds  No wheezing or rales  Abdominal:      General: Bowel sounds are normal  There is no distension or abdominal bruit  Palpations: Abdomen is soft  There is no hepatomegaly, splenomegaly or mass  Tenderness: There is no abdominal tenderness  There is no guarding or rebound  Musculoskeletal:      Right lower leg: No edema  Left lower leg: No edema  Lymphadenopathy:      Cervical: No cervical adenopathy  Upper Body:      Right upper body: No supraclavicular adenopathy  Left upper body: No supraclavicular adenopathy  Skin:     Findings: No rash  Nails: There is no clubbing  Neurological:      General: No focal deficit present  Mental Status: She is alert and oriented to person, place, and time     Psychiatric:         Mood and Affect: Mood normal  I will STOP taking the medications listed below when I get home from the hospital:    Invega 6 mg oral tablet, extended release  -- 1 tab(s) by mouth 2 times a day    Haldol 1 mg oral tablet  -- 1 tab(s) by mouth 3 times a day    LORazepam 2 mg/mL injectable solution  -- 2 milligram(s) injectable every 8 hours, As Needed - for agitation    pantoprazole 40 mg oral delayed release tablet  -- 1 tab(s) by mouth once a day   I will STOP taking the medications listed below when I get home from the hospital:    Invega 6 mg oral tablet, extended release  -- 1 tab(s) by mouth 2 times a day    Haldol 1 mg oral tablet  -- 1 tab(s) by mouth 3 times a day    LORazepam 2 mg/mL injectable solution  -- 2 milligram(s) injectable every 8 hours, As Needed - for agitation

## 2021-10-13 NOTE — H&P ADULT - NSTOBACCOSCREENHP_GEN_A_NCS
Patient ambulatory to the bathroom. Patient returned to the bathroom and pt's oxygen was 86% on room air. Patient placed on 2L and oxygen increased to 91%. Patient will continue to be on cardiac monitor, pulse oximeter and blood pressure cuff.  Mother at ca Patient declined to answer

## 2022-09-17 ENCOUNTER — INPATIENT (INPATIENT)
Facility: HOSPITAL | Age: 67
LOS: 4 days | Discharge: PSYCHIATRIC OR SUBSTANCE USE FACILITY | End: 2022-09-22
Attending: STUDENT IN AN ORGANIZED HEALTH CARE EDUCATION/TRAINING PROGRAM | Admitting: STUDENT IN AN ORGANIZED HEALTH CARE EDUCATION/TRAINING PROGRAM

## 2022-09-17 VITALS
HEIGHT: 62 IN | RESPIRATION RATE: 28 BRPM | SYSTOLIC BLOOD PRESSURE: 145 MMHG | OXYGEN SATURATION: 99 % | HEART RATE: 114 BPM | DIASTOLIC BLOOD PRESSURE: 70 MMHG | TEMPERATURE: 96 F

## 2022-09-17 LAB
ALBUMIN SERPL ELPH-MCNC: 4 G/DL — SIGNIFICANT CHANGE UP (ref 3.5–5.2)
ALP SERPL-CCNC: 64 U/L — SIGNIFICANT CHANGE UP (ref 30–115)
ALT FLD-CCNC: 13 U/L — SIGNIFICANT CHANGE UP (ref 0–41)
ANION GAP SERPL CALC-SCNC: 9 MMOL/L — SIGNIFICANT CHANGE UP (ref 7–14)
AST SERPL-CCNC: 21 U/L — SIGNIFICANT CHANGE UP (ref 0–41)
BASE EXCESS BLDV CALC-SCNC: 2.9 MMOL/L — SIGNIFICANT CHANGE UP (ref -2–3)
BASOPHILS # BLD AUTO: 0.03 K/UL — SIGNIFICANT CHANGE UP (ref 0–0.2)
BASOPHILS NFR BLD AUTO: 0.2 % — SIGNIFICANT CHANGE UP (ref 0–1)
BILIRUB SERPL-MCNC: 0.3 MG/DL — SIGNIFICANT CHANGE UP (ref 0.2–1.2)
BUN SERPL-MCNC: 20 MG/DL — SIGNIFICANT CHANGE UP (ref 10–20)
CA-I SERPL-SCNC: 1.23 MMOL/L — SIGNIFICANT CHANGE UP (ref 1.15–1.33)
CALCIUM SERPL-MCNC: 9.5 MG/DL — SIGNIFICANT CHANGE UP (ref 8.4–10.5)
CHLORIDE SERPL-SCNC: 105 MMOL/L — SIGNIFICANT CHANGE UP (ref 98–110)
CO2 SERPL-SCNC: 26 MMOL/L — SIGNIFICANT CHANGE UP (ref 17–32)
CREAT SERPL-MCNC: 0.6 MG/DL — LOW (ref 0.7–1.5)
EGFR: 97 ML/MIN/1.73M2 — SIGNIFICANT CHANGE UP
EOSINOPHIL # BLD AUTO: 0.18 K/UL — SIGNIFICANT CHANGE UP (ref 0–0.7)
EOSINOPHIL NFR BLD AUTO: 1.3 % — SIGNIFICANT CHANGE UP (ref 0–8)
GAS PNL BLDV: 135 MMOL/L — LOW (ref 136–145)
GAS PNL BLDV: SIGNIFICANT CHANGE UP
GLUCOSE SERPL-MCNC: 98 MG/DL — SIGNIFICANT CHANGE UP (ref 70–99)
HCO3 BLDV-SCNC: 30 MMOL/L — HIGH (ref 22–29)
HCT VFR BLD CALC: 42 % — SIGNIFICANT CHANGE UP (ref 37–47)
HCT VFR BLDA CALC: 61 % — CRITICAL HIGH (ref 39–51)
HGB BLD CALC-MCNC: 20.4 G/DL — CRITICAL HIGH (ref 12.6–17.4)
HGB BLD-MCNC: 13.9 G/DL — SIGNIFICANT CHANGE UP (ref 12–16)
IMM GRANULOCYTES NFR BLD AUTO: 0.6 % — HIGH (ref 0.1–0.3)
LACTATE BLDV-MCNC: 1 MMOL/L — SIGNIFICANT CHANGE UP (ref 0.5–2)
LYMPHOCYTES # BLD AUTO: 1.47 K/UL — SIGNIFICANT CHANGE UP (ref 1.2–3.4)
LYMPHOCYTES # BLD AUTO: 10.5 % — LOW (ref 20.5–51.1)
MCHC RBC-ENTMCNC: 28.7 PG — SIGNIFICANT CHANGE UP (ref 27–31)
MCHC RBC-ENTMCNC: 33.1 G/DL — SIGNIFICANT CHANGE UP (ref 32–37)
MCV RBC AUTO: 86.8 FL — SIGNIFICANT CHANGE UP (ref 81–99)
MONOCYTES # BLD AUTO: 1.11 K/UL — HIGH (ref 0.1–0.6)
MONOCYTES NFR BLD AUTO: 7.9 % — SIGNIFICANT CHANGE UP (ref 1.7–9.3)
NEUTROPHILS # BLD AUTO: 11.19 K/UL — HIGH (ref 1.4–6.5)
NEUTROPHILS NFR BLD AUTO: 79.5 % — HIGH (ref 42.2–75.2)
NRBC # BLD: 0 /100 WBCS — SIGNIFICANT CHANGE UP (ref 0–0)
NT-PROBNP SERPL-SCNC: 199 PG/ML — SIGNIFICANT CHANGE UP (ref 0–300)
PCO2 BLDV: 50 MMHG — HIGH (ref 39–42)
PH BLDV: 7.38 — SIGNIFICANT CHANGE UP (ref 7.32–7.43)
PLATELET # BLD AUTO: 158 K/UL — SIGNIFICANT CHANGE UP (ref 130–400)
PO2 BLDV: 56 MMHG — SIGNIFICANT CHANGE UP
POTASSIUM BLDV-SCNC: 4.1 MMOL/L — SIGNIFICANT CHANGE UP (ref 3.5–5.1)
POTASSIUM SERPL-MCNC: 5.2 MMOL/L — HIGH (ref 3.5–5)
POTASSIUM SERPL-SCNC: 5.2 MMOL/L — HIGH (ref 3.5–5)
PROT SERPL-MCNC: 6.6 G/DL — SIGNIFICANT CHANGE UP (ref 6–8)
RBC # BLD: 4.84 M/UL — SIGNIFICANT CHANGE UP (ref 4.2–5.4)
RBC # FLD: 13 % — SIGNIFICANT CHANGE UP (ref 11.5–14.5)
SAO2 % BLDV: 89 % — SIGNIFICANT CHANGE UP
SODIUM SERPL-SCNC: 140 MMOL/L — SIGNIFICANT CHANGE UP (ref 135–146)
TROPONIN T SERPL-MCNC: <0.01 NG/ML — SIGNIFICANT CHANGE UP
WBC # BLD: 14.06 K/UL — HIGH (ref 4.8–10.8)
WBC # FLD AUTO: 14.06 K/UL — HIGH (ref 4.8–10.8)

## 2022-09-17 PROCEDURE — 93010 ELECTROCARDIOGRAM REPORT: CPT

## 2022-09-17 PROCEDURE — 71045 X-RAY EXAM CHEST 1 VIEW: CPT | Mod: 26

## 2022-09-17 PROCEDURE — 99285 EMERGENCY DEPT VISIT HI MDM: CPT | Mod: CS

## 2022-09-17 RX ORDER — BUDESONIDE AND FORMOTEROL FUMARATE DIHYDRATE 160; 4.5 UG/1; UG/1
2 AEROSOL RESPIRATORY (INHALATION)
Qty: 0 | Refills: 0 | DISCHARGE

## 2022-09-17 RX ORDER — HALOPERIDOL DECANOATE 100 MG/ML
5 INJECTION INTRAMUSCULAR ONCE
Refills: 0 | Status: COMPLETED | OUTPATIENT
Start: 2022-09-17 | End: 2022-09-17

## 2022-09-17 RX ORDER — OLANZAPINE 15 MG/1
1 TABLET, FILM COATED ORAL
Qty: 0 | Refills: 0 | DISCHARGE

## 2022-09-17 RX ORDER — IPRATROPIUM/ALBUTEROL SULFATE 18-103MCG
3 AEROSOL WITH ADAPTER (GRAM) INHALATION ONCE
Refills: 0 | Status: COMPLETED | OUTPATIENT
Start: 2022-09-17 | End: 2022-09-17

## 2022-09-17 RX ORDER — RISPERIDONE 4 MG/1
4 TABLET ORAL
Refills: 0 | Status: DISCONTINUED | OUTPATIENT
Start: 2022-09-17 | End: 2022-09-22

## 2022-09-17 RX ORDER — BUDESONIDE AND FORMOTEROL FUMARATE DIHYDRATE 160; 4.5 UG/1; UG/1
2 AEROSOL RESPIRATORY (INHALATION)
Refills: 0 | Status: DISCONTINUED | OUTPATIENT
Start: 2022-09-17 | End: 2022-09-22

## 2022-09-17 RX ORDER — SIMVASTATIN 20 MG/1
20 TABLET, FILM COATED ORAL AT BEDTIME
Refills: 0 | Status: DISCONTINUED | OUTPATIENT
Start: 2022-09-17 | End: 2022-09-22

## 2022-09-17 RX ORDER — ENOXAPARIN SODIUM 100 MG/ML
30 INJECTION SUBCUTANEOUS EVERY 24 HOURS
Refills: 0 | Status: DISCONTINUED | OUTPATIENT
Start: 2022-09-17 | End: 2022-09-22

## 2022-09-17 RX ORDER — HALOPERIDOL DECANOATE 100 MG/ML
100 INJECTION INTRAMUSCULAR
Qty: 0 | Refills: 0 | DISCHARGE

## 2022-09-17 RX ORDER — FAMOTIDINE 10 MG/ML
20 INJECTION INTRAVENOUS
Refills: 0 | Status: DISCONTINUED | OUTPATIENT
Start: 2022-09-17 | End: 2022-09-22

## 2022-09-17 RX ORDER — FAMOTIDINE 10 MG/ML
1 INJECTION INTRAVENOUS
Qty: 0 | Refills: 0 | DISCHARGE

## 2022-09-17 RX ORDER — AZITHROMYCIN 500 MG/1
500 TABLET, FILM COATED ORAL ONCE
Refills: 0 | Status: COMPLETED | OUTPATIENT
Start: 2022-09-17 | End: 2022-09-17

## 2022-09-17 RX ORDER — BENZTROPINE MESYLATE 1 MG
1 TABLET ORAL AT BEDTIME
Refills: 0 | Status: DISCONTINUED | OUTPATIENT
Start: 2022-09-17 | End: 2022-09-22

## 2022-09-17 RX ORDER — ZINC OXIDE 200 MG/G
1 OINTMENT TOPICAL
Refills: 0 | Status: DISCONTINUED | OUTPATIENT
Start: 2022-09-17 | End: 2022-09-22

## 2022-09-17 RX ORDER — OLANZAPINE 15 MG/1
15 TABLET, FILM COATED ORAL DAILY
Refills: 0 | Status: DISCONTINUED | OUTPATIENT
Start: 2022-09-17 | End: 2022-09-22

## 2022-09-17 RX ORDER — CHOLECALCIFEROL (VITAMIN D3) 125 MCG
50 CAPSULE ORAL
Qty: 0 | Refills: 0 | DISCHARGE

## 2022-09-17 RX ORDER — HYDROXYZINE HCL 10 MG
1 TABLET ORAL
Qty: 0 | Refills: 0 | DISCHARGE

## 2022-09-17 RX ORDER — ALBUTEROL 90 UG/1
2 AEROSOL, METERED ORAL EVERY 6 HOURS
Refills: 0 | Status: DISCONTINUED | OUTPATIENT
Start: 2022-09-17 | End: 2022-09-22

## 2022-09-17 RX ORDER — SIMVASTATIN 20 MG/1
1 TABLET, FILM COATED ORAL
Qty: 0 | Refills: 0 | DISCHARGE

## 2022-09-17 RX ORDER — HYDROXYZINE HCL 10 MG
50 TABLET ORAL EVERY 8 HOURS
Refills: 0 | Status: DISCONTINUED | OUTPATIENT
Start: 2022-09-17 | End: 2022-09-22

## 2022-09-17 RX ADMIN — AZITHROMYCIN 255 MILLIGRAM(S): 500 TABLET, FILM COATED ORAL at 22:55

## 2022-09-17 RX ADMIN — HALOPERIDOL DECANOATE 5 MILLIGRAM(S): 100 INJECTION INTRAMUSCULAR at 22:04

## 2022-09-17 NOTE — ED PROVIDER NOTE - OBJECTIVE STATEMENT
66 yo female with a pmh of COPD, GERD, and schizophrenia presents from [facility] for SOB. pt started to feel SOB and was given a inhaler but was found to be 84% on RA. pt states her SOB started today and facility staff states she was well yesterday. pt denies any other symptoms including fevers, chill, headache, recent illness/travel, cough, abdominal pain, chest pain.

## 2022-09-17 NOTE — H&P ADULT - HISTORY OF PRESENT ILLNESS
66 yo female with a PMHx of COPD, GERD, and schizophrenia presents from [facility] for SOB. Patient started to feel SOB and was given a inhaler but was found to be 84% on RA. Patient  states her SOB started today and facility staff states she was well yesterday. No recent COPD exacerbations. Patient denies any other symptoms including fevers, chill, headache, recent illness/travel, cough, abdominal pain, chest pain.    In the ED patient was tachypneic and tachycardic T(F): 98.6  HR: 114  BP: 137/80  RR: 28  SpO2: 99% on 3L, s/p duonebs and IV Solumedrol 125mg IV improved to 96% SpO2 on room air. RVP panel and covid pcr sent to lab. Patient admitted to medicine for acute hypoxic respiratory failure likely secondary to  exaserbation     66 yo female with a PMHx of COPD, GERD, and schizophrenia presents from [facility] for SOB. Patient started to feel SOB and was given a inhaler but was found to be 84% on RA. Patient  states her SOB started today and facility staff states she was well yesterday. No recent COPD exacerbations. Patient denies any other symptoms including fevers, chill, headache, recent illness/travel, cough, abdominal pain, chest pain.    In the ED patient was tachypneic and tachycardic T(F): 98.6  HR: 114  BP: 137/80  RR: 28  SpO2: 99% on 3L, s/p duonebs and IV Solumedrol 125mg IV improved to 96% SpO2 on room air. RVP panel and covid pcr sent to lab. Patient admitted to medicine for acute hypoxic respiratory failure likely secondary to copd exaserbation

## 2022-09-17 NOTE — H&P ADULT - ASSESSMENT
#Acute hypoxic resp failure secondary to copd exaserbation  -hypoxia and tachypnea improved s/p duonebs and IV steroids  -weaned to room air in ED  PLAN:  - c/w duonebs bid and IV steroids  - albuterol PRN wheezing  - f/u RVP, CXR  - trend wbc, continue with IV azithromycin     #Schizophrenia  - c/w current psychiatric medication regimen   - patient currently normal affect without hallucinations/ illogical thinking    #GERD       #Acute hypoxic resp failure secondary to copd exaserbation  -hypoxia and tachypnea improved s/p duonebs and IV steroids  -weaned to room air in ED  PLAN:  - c/w duonebs bid and IV steroids  - albuterol PRN wheezing  - f/u RVP, CXR  - trend wbc, continue with IV azithromycin     #Schizophrenia  - c/w current psychiatric medication regimen olanzapine + bid risperidone  - patient currently normal affect without hallucinations/ illogical thinking    #GERD  -c/w home antacid    #Diet: dash/tlc  #Activity: ambulate as tolerated (pt independent at facility)  #DVT prophylaxis: lovenox subq

## 2022-09-17 NOTE — ED PROVIDER NOTE - PHYSICAL EXAMINATION
Gen: NAD, AOx3  Head: NCAT  HEENT: PERRL, oral mucosa moist, normal conjunctiva, oropharynx clear without exudate or erythema  Lung: no respiratory distress, dec bs diffusely, poor inspiratory effort  CV: normal s1/s2, rrr, Normal perfusion, pulses 2+ throughout  Abd: soft, NTND, no CVA tenderness  Genitourinary: no pelvic tenderness  MSK: No edema, no visible deformities  Neuro: No focal neurologic deficits  Skin: No rash   Psych: normal affect

## 2022-09-17 NOTE — H&P ADULT - NSHPLABSRESULTS_GEN_ALL_CORE
LABS:                        13.9   14.06 )-----------( 158      ( 17 Sep 2022 19:30 )             42.0     09-17    140  |  105  |  20  ----------------------------<  98  5.2<H>   |  26  |  0.6<L>    Ca    9.5      17 Sep 2022 19:30    TPro  6.6  /  Alb  4.0  /  TBili  0.3  /  DBili  x   /  AST  21  /  ALT  13  /  AlkPhos  64  09-17          Troponin T, Serum: <0.01 ng/mL (09-17-22 @ 19:30)      CARDIAC MARKERS ( 17 Sep 2022 19:30 )  x     / <0.01 ng/mL / x     / x     / x          RADIOLOGY:     CXR pending

## 2022-09-17 NOTE — ED PROVIDER NOTE - ATTENDING CONTRIBUTION TO CARE
68 yo f with pmh of gerd, schizophrenia, copd not on home o2, sent from [facility] for sob.  as per aide, pt usually has a hard time using her inhaler.  today, pt has been more sob.  found to be hypoxic as low as 84% on ra.  no cp or sob.  no fever or chills.  no abd pain, no leg pain or swelling.  pt is limited historian.  exam: nad, ncat, perrl, eomi, mmm, rrr, dec bs diffusely, poor inspiratory effort, abd soft, nt, nd aox3, no calf tenderness, no pitting edema imp: pt with sob, hypoxia, likely copd exacerbation r/o pna, r/o covid, will check labs, cxr, nebs, swab, admission

## 2022-09-17 NOTE — ED PROVIDER NOTE - NS ED ATTENDING STATEMENT MOD
I have seen and examined this patient and fully participated in the care of this patient as the teaching attending.  The service was shared with the CHENTE.  I reviewed and verified the documentation and independently performed the documented:

## 2022-09-18 LAB
RAPID RVP RESULT: SIGNIFICANT CHANGE UP
SARS-COV-2 RNA SPEC QL NAA+PROBE: SIGNIFICANT CHANGE UP

## 2022-09-18 PROCEDURE — 99223 1ST HOSP IP/OBS HIGH 75: CPT

## 2022-09-18 RX ADMIN — Medication 50 MILLIGRAM(S): at 08:15

## 2022-09-18 RX ADMIN — ZINC OXIDE 1 APPLICATION(S): 200 OINTMENT TOPICAL at 08:16

## 2022-09-18 RX ADMIN — BUDESONIDE AND FORMOTEROL FUMARATE DIHYDRATE 2 PUFF(S): 160; 4.5 AEROSOL RESPIRATORY (INHALATION) at 08:15

## 2022-09-18 RX ADMIN — RISPERIDONE 4 MILLIGRAM(S): 4 TABLET ORAL at 08:16

## 2022-09-18 RX ADMIN — Medication 50 MILLIGRAM(S): at 13:17

## 2022-09-18 RX ADMIN — OLANZAPINE 15 MILLIGRAM(S): 15 TABLET, FILM COATED ORAL at 12:16

## 2022-09-18 RX ADMIN — ZINC OXIDE 1 APPLICATION(S): 200 OINTMENT TOPICAL at 19:32
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
## 2022-09-18 NOTE — CHART NOTE - NSCHARTNOTEFT_GEN_A_CORE
patient was refused her morning labs and refused O2 supp although sat was 88.  Went at bedside and discussed risk of refusing O2 supp, the patient is AAOx3 and still didn't want it. She stated she will take her meds at 8am

## 2022-09-18 NOTE — PATIENT PROFILE ADULT - FALL HARM RISK - HARM RISK INTERVENTIONS

## 2022-09-18 NOTE — CHART NOTE - NSCHARTNOTEFT_GEN_A_CORE
Patient removed IV line and is refusing all night medications. She also refused putting a new a line. Patient removed IV line and is refusing all night medications. She also refused putting a new IV line. Patient removed IV line and is refusing all night medications. She also refused putting a new IV line.  Patient refused Tylenol for fever.

## 2022-09-19 PROCEDURE — 99233 SBSQ HOSP IP/OBS HIGH 50: CPT

## 2022-09-19 PROCEDURE — 99221 1ST HOSP IP/OBS SF/LOW 40: CPT

## 2022-09-19 RX ADMIN — Medication 50 MILLIGRAM(S): at 08:13

## 2022-09-19 RX ADMIN — ZINC OXIDE 1 APPLICATION(S): 200 OINTMENT TOPICAL at 17:40

## 2022-09-19 RX ADMIN — RISPERIDONE 4 MILLIGRAM(S): 4 TABLET ORAL at 17:38

## 2022-09-19 RX ADMIN — RISPERIDONE 4 MILLIGRAM(S): 4 TABLET ORAL at 08:13

## 2022-09-19 RX ADMIN — FAMOTIDINE 20 MILLIGRAM(S): 10 INJECTION INTRAVENOUS at 08:13

## 2022-09-19 RX ADMIN — ENOXAPARIN SODIUM 30 MILLIGRAM(S): 100 INJECTION SUBCUTANEOUS at 21:06

## 2022-09-19 RX ADMIN — OLANZAPINE 15 MILLIGRAM(S): 15 TABLET, FILM COATED ORAL at 17:37

## 2022-09-19 RX ADMIN — BUDESONIDE AND FORMOTEROL FUMARATE DIHYDRATE 2 PUFF(S): 160; 4.5 AEROSOL RESPIRATORY (INHALATION) at 21:03

## 2022-09-19 RX ADMIN — BUDESONIDE AND FORMOTEROL FUMARATE DIHYDRATE 2 PUFF(S): 160; 4.5 AEROSOL RESPIRATORY (INHALATION) at 08:13

## 2022-09-19 RX ADMIN — FAMOTIDINE 20 MILLIGRAM(S): 10 INJECTION INTRAVENOUS at 17:37

## 2022-09-19 RX ADMIN — ALBUTEROL 2 PUFF(S): 90 AEROSOL, METERED ORAL at 21:03
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
## 2022-09-19 NOTE — BH CONSULTATION LIAISON ASSESSMENT NOTE - DESCRIPTION
Has two adult children. Is  from . Attended two years of college studying psychology and liberal arts.

## 2022-09-19 NOTE — BH CONSULTATION LIAISON ASSESSMENT NOTE - NSBHATTESTCOMMENTATTENDFT_PSY_A_CORE
Ms Frazier is a 67 year old woman with a history of Schizophrenia who was admitted to the medical floor for the management of COPD exacerbation. Psychiatry consult was called for a mental health evaluation. According to the medical team, patient is refusing both P.OP and I.V medical medications and is impeding medical care .   Patient seems to have a chronic history of non adherence with medications in the context of poor insight into both her psychiatric and medical problems. Despite her noncompliance with medication , patient's psychotic symptoms seems to be stable however it seems that she is unable to take care of herself in the community , hence the inpatient psychiatric hospitalization in the long term   For now, patient does not appear to have acute symptoms of psychosis, siri or depression. She denies having current suicidal ideations, intent or plan.     At this time, patient is not considered an imminent danger to herself or others however she seems to have chronic symptoms of psychosis that is impairing her functioning enough that she continues to need inpatient psychiatric hospitalization in a long term psychiatric hospital. For now , there is no acute indication for re-starting her psychotropic medications. However , with regards to her compliance with medical medications , we recommend close coordination of care between the medical team in this hospital and the medical team covering the inpatient psychiatry floor in which patient resides in [facility]. Please re-call the  psychiatry team as needed .  Ms Frazier is a 67 year old woman with a history of Schizophrenia who was admitted to the medical floor for the management of COPD exacerbation. Psychiatry consult was called for a mental health evaluation. According to the medical team, patient is refusing both P.OP and I.V medical medications and is impeding medical care .   Patient seems to have a chronic history of non adherence with medications in the context of poor insight into both her psychiatric and medical problems. Despite her noncompliance with medication , patient's psychotic symptoms seems to be stable however it seems that she is unable to take care of herself in the community , hence the inpatient psychiatric hospitalization in the long term psychiatric hospital.   For now, patient does not appear to have acute symptoms of psychosis, siri or depression. She denies having current suicidal ideations, intent or plan.   At this time, patient is not considered an imminent danger to herself or others however she seems to have chronic symptoms of psychosis that is impairing her functioning enough that she continues to need inpatient psychiatric hospitalization in a long term psychiatric hospital. For now , there is no acute indication for re-starting her psychotropic medications. However , with regards to her compliance with medical medications , we recommend close coordination of care between the medical team in this hospital and the medical team covering the inpatient psychiatry floor in which patient resides in [facility]. Please re-call the  psychiatry team as needed .

## 2022-09-19 NOTE — BH CONSULTATION LIAISON ASSESSMENT NOTE - NSBHCONSULTFOLLOWAFTERCARE_PSY_A_CORE FT
On medical clearance, patient can be discharged back to the inpatient psychiatric floor , 3A of [facility], [address] [place] [address]

## 2022-09-19 NOTE — BH CONSULTATION LIAISON ASSESSMENT NOTE - NSBHINDICATION_PSY_ALL_CORE
Patient is admitted to an inpatient psychiatric floor in [facility]

## 2022-09-19 NOTE — PROGRESS NOTE ADULT - ASSESSMENT
66 yo female with a PMHx of COPD, GERD, and schizophrenia presents from [facility] for SOB.  #Acute hypoxic resp failure secondary to copd exaserbation  - patient saturating 93% on 4 L Nc   - s/p duonebs and IV steroids  - cw albuterol 90mcg q6  - patient spiked a fever of 38.4c, infectious workup was sent  - FU on Blood Cx, CBC, CMP ]  - patient is refusing IV line insertion, medications, blood test,  - FU psych note for management  - an attempt to reach [facility] was unsuccessful   - trend wbc, continue with IV azithromycin   - 11 am labs were ordered   - FU procal     #Schizophrenia  - c/w current psychiatric medication regimen olanzapine + bid risperidone + benztropine  - patient currently normal affect without hallucinations/ illogical thinking    #GERD  -c/w home antacid

## 2022-09-19 NOTE — BH CONSULTATION LIAISON ASSESSMENT NOTE - CURRENT MEDICATION
MEDICATIONS  (STANDING):  ALBUTerol    90 MICROgram(s) HFA Inhaler 2 Puff(s) Inhalation every 6 hours  benztropine 1 milliGRAM(s) Oral at bedtime  bisacodyl 5 milliGRAM(s) Oral at bedtime  budesonide 160 MICROgram(s)/formoterol 4.5 MICROgram(s) Inhaler 2 Puff(s) Inhalation two times a day  enoxaparin Injectable 30 milliGRAM(s) SubCutaneous every 24 hours  famotidine    Tablet 20 milliGRAM(s) Oral two times a day  hydrOXYzine hydrochloride 50 milliGRAM(s) Oral every 8 hours  OLANZapine 15 milliGRAM(s) Oral daily  risperiDONE   Tablet 4 milliGRAM(s) Oral two times a day  simvastatin 20 milliGRAM(s) Oral at bedtime  zinc oxide 20% Ointment 1 Application(s) Topical two times a day    MEDICATIONS  (PRN):

## 2022-09-19 NOTE — BH CONSULTATION LIAISON ASSESSMENT NOTE - NSBHCHARTREVIEWVS_PSY_A_CORE FT
Vital Signs Last 24 Hrs  T(C): 36.9 (19 Sep 2022 13:40), Max: 38.4 (19 Sep 2022 05:56)  T(F): 98.5 (19 Sep 2022 13:40), Max: 101.1 (19 Sep 2022 05:56)  HR: 89 (19 Sep 2022 13:40) (80 - 101)  BP: 122/56 (19 Sep 2022 13:40) (122/56 - 136/67)  BP(mean): --  RR: 18 (19 Sep 2022 13:40) (18 - 18)  SpO2: 93% (19 Sep 2022 05:56) (93% - 93%)    Parameters below as of 19 Sep 2022 05:56  Patient On (Oxygen Delivery Method): nasal cannula
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
## 2022-09-19 NOTE — PROGRESS NOTE ADULT - ASSESSMENT
68 yo female with a PMHx of COPD, GERD, and schizophrenia presents from [facility] for SOB. Patient started to feel SOB and was given a inhaler but was found to be 84% on RA. Patient  states her SOB started today and facility staff states she was well yesterday. No recent COPD exacerbations. Patient denies any other symptoms including fevers, chill, headache, recent illness/travel, cough, abdominal pain, chest pain.    In the ED patient was tachypneic and tachycardic T(F): 98.6  HR: 114  BP: 137/80  RR: 28  SpO2: 99% on 3L, s/p duonebs and IV Solumedrol 125mg IV improved to 96% SpO2 on room air. RVP panel and covid pcr sent to lab. Patient admitted to medicine for acute hypoxic respiratory failure likely secondary to copd exaserbation    #Acute hypoxic resp failure secondary to copd exacerbation  -hypoxia and tachypnea improved s/p duonebs and IV steroids  -weaned to room air in ED  - c/w duonebs bid and IV steroids  - albuterol PRN wheezing  -  RVP >> Neg , CXR > NAPD  -  continue with IV azithromycin     #Schizophrenia  - c/w current psychiatric medication regimen olanzapine + bid risperidone  - patient currently normal affect without hallucinations/ illogical thinking    #GERD  -c/w home antacid    #Diet: dash/tlc  #Activity: ambulate as tolerated (pt independent at facility)  #DVT prophylaxis: lovenox subq    #Progress Note Handoff  Pending (specify):  Clinical improvement   Family discussion:  Disposition: IPP

## 2022-09-19 NOTE — BH CONSULTATION LIAISON ASSESSMENT NOTE - RISK ASSESSMENT
The patient is at chronically elevated risk of health decompensation because she has a long history of medication noncompliance and several conditions like COPD and schizophrenia that require medication. Both her insight and judgement are chronically impaired. However, she is not at any acute risk of decompensating as her health currently stable and she resides on an inpatient unit that has psychiatrists and medical doctors.

## 2022-09-19 NOTE — BH CONSULTATION LIAISON ASSESSMENT NOTE - NSBHREFERDETAILS_PSY_A_CORE_FT
68 yo female with a PMHx of COPD, GERD, and schizophrenia admitted for COPD exacerbation. Psych consult was placed because she is refusing meds and IV.  mental health evaluation

## 2022-09-19 NOTE — BH CONSULTATION LIAISON ASSESSMENT NOTE - NSBHCONSULTRECOMMENDOTHER_PSY_A_CORE FT
There is no acute indication for standing psychotropic medications   We recommend coordination of care with the medical staff of the inpatient psychiatric floor that patient is admitted to in [facility] and the medical team in our hospital on how to safely treat the patient. For now , there is no acute indication to start psychotropic medications in the hospital.

## 2022-09-19 NOTE — BH CONSULTATION LIAISON ASSESSMENT NOTE - OTHER PAST PSYCHIATRIC HISTORY (INCLUDE DETAILS REGARDING ONSET, COURSE OF ILLNESS, INPATIENT/OUTPATIENT TREATMENT)
Schizophrenia, current resident on inpatient unit at [facility]; has had multiple inpatient hospitalizations prior to this one.

## 2022-09-20 DIAGNOSIS — F20.9 SCHIZOPHRENIA, UNSPECIFIED: ICD-10-CM

## 2022-09-20 PROCEDURE — 99233 SBSQ HOSP IP/OBS HIGH 50: CPT

## 2022-09-20 RX ADMIN — RISPERIDONE 4 MILLIGRAM(S): 4 TABLET ORAL at 08:31

## 2022-09-20 RX ADMIN — ZINC OXIDE 1 APPLICATION(S): 200 OINTMENT TOPICAL at 08:31

## 2022-09-20 RX ADMIN — FAMOTIDINE 20 MILLIGRAM(S): 10 INJECTION INTRAVENOUS at 08:30

## 2022-09-20 RX ADMIN — Medication 50 MILLIGRAM(S): at 08:31

## 2022-09-20 RX ADMIN — OLANZAPINE 15 MILLIGRAM(S): 15 TABLET, FILM COATED ORAL at 17:25

## 2022-09-20 RX ADMIN — ALBUTEROL 2 PUFF(S): 90 AEROSOL, METERED ORAL at 20:01

## 2022-09-20 RX ADMIN — FAMOTIDINE 20 MILLIGRAM(S): 10 INJECTION INTRAVENOUS at 17:25

## 2022-09-20 RX ADMIN — Medication 50 MILLIGRAM(S): at 17:25

## 2022-09-20 RX ADMIN — BUDESONIDE AND FORMOTEROL FUMARATE DIHYDRATE 2 PUFF(S): 160; 4.5 AEROSOL RESPIRATORY (INHALATION) at 08:31

## 2022-09-20 RX ADMIN — RISPERIDONE 4 MILLIGRAM(S): 4 TABLET ORAL at 18:11

## 2022-09-20 RX ADMIN — BUDESONIDE AND FORMOTEROL FUMARATE DIHYDRATE 2 PUFF(S): 160; 4.5 AEROSOL RESPIRATORY (INHALATION) at 20:31

## 2022-09-20 RX ADMIN — ZINC OXIDE 1 APPLICATION(S): 200 OINTMENT TOPICAL at 17:25

## 2022-09-20 NOTE — PROGRESS NOTE ADULT - ASSESSMENT
68 yo female with a PMHx of COPD, GERD, and schizophrenia presents from [facility] for SOB.  #Acute hypoxic resp failure secondary to copd exaserbation  - patient saturating 98% on 3 L Nc   - s/p duonebs and IV steroids  - cw albuterol 90mcg q6  - patient spiked a fever of 38.4c on 09/18, infectious workup was sent,   - FU on Blood Cx  - patient is refusing IV line insertion, medications, blood test,  - IV solumedrol was changed to prednisone 40 mg PO OD, because patient is refusing IV line   - cw hydorxyzine, benztropine, olanzapine, respiredone   - an attempt to reach [facility] was unsuccessful   - trend wbc, patient is refusing labs    - FU procal   - FU psych recs     #Schizophrenia  - c/w current psychiatric medication regimen olanzapine + bid risperidone + benztropine+ hydroxyzine   - patientis ANOx3 with delusional ideations     #GERD  -c/w famotidine

## 2022-09-20 NOTE — PROGRESS NOTE ADULT - ASSESSMENT
66 yo female with a PMHx of COPD, GERD, and schizophrenia presents from [facility] for SOB. Patient started to feel SOB and was given a inhaler but was found to be 84% on RA. Patient  states her SOB started today and facility staff states she was well yesterday. No recent COPD exacerbations. Patient denies any other symptoms including fevers, chill, headache, recent illness/travel, cough, abdominal pain, chest pain.    In the ED patient was tachypneic and tachycardic T(F): 98.6  HR: 114  BP: 137/80  RR: 28  SpO2: 99% on 3L, s/p duonebs and IV Solumedrol 125mg IV improved to 96% SpO2 on room air. RVP panel and covid pcr sent to lab. Patient admitted to medicine for acute hypoxic respiratory failure likely secondary to copd exaserbation    #Acute hypoxic resp failure secondary to copd exacerbation  -hypoxia and tachypnea improved s/p duonebs and IV steroids  -weaned to room air in ED >> Currently back to NC 2L sPO2 91%  - c/w duonebs bid and IV steroids ( pulled out IV and refusing meds )    switch Solumedrol to Prednisone.   - albuterol PRN wheezing  -  RVP >> Neg , CXR > NAPD    #Schizophrenia  - c/w current psychiatric medication regimen olanzapine + bid risperidone  - patient currently normal affect without hallucinations/ illogical thinking    #GERD  -c/w home antacid    #Diet: dash/tlc  #Activity: ambulate as tolerated (pt independent at facility)  #DVT prophylaxis: lovenox subq    #Progress Note Handoff  Pending (specify):  Clinical improvement with O2  Family discussion:  Disposition: IPP     68 yo female with a PMHx of COPD, GERD, and schizophrenia presents from [facility] for SOB. Patient started to feel SOB and was given a inhaler but was found to be 84% on RA. Patient  states her SOB started today and facility staff states she was well yesterday. No recent COPD exacerbations. Patient denies any other symptoms including fevers, chill, headache, recent illness/travel, cough, abdominal pain, chest pain.    In the ED patient was tachypneic and tachycardic T(F): 98.6  HR: 114  BP: 137/80  RR: 28  SpO2: 99% on 3L, s/p duonebs and IV Solumedrol 125mg IV improved to 96% SpO2 on room air. RVP panel and covid pcr sent to lab. Patient admitted to medicine for acute hypoxic respiratory failure likely secondary to copd exaserbation    #Acute hypoxic resp failure secondary to copd exacerbation  -hypoxia and tachypnea improved s/p duonebs and IV steroids  -weaned to room air in ED >> Currently back to NC 2L sPO2 91%  - c/w duonebs bid and IV steroids ( pulled out IV and refusing meds )    switch Solumedrol to Prednisone.   - albuterol PRN wheezing  -  RVP >> Neg , CXR > NAPD  - Had Temp 101 yesterday >> remains afebrile since then.    Patient kept refusing CXR, Blood draws and IV Access.    Spoke to jeovany Maharaj who is HCP to be notified if spikes fever again and pt needs to be worked up.    Patient clinically improving as of today.    Plan d/w the Daughter Carol Ann ( [phone] ) at length at bedside.     #Schizophrenia  - c/w current psychiatric medication regimen olanzapine + bid risperidone  - patient currently normal affect without hallucinations/ illogical thinking    #GERD  -c/w home antacid    #Diet: dash/tlc  #Activity: ambulate as tolerated (pt independent at facility)  #DVT prophylaxis: lovenox subq    #Progress Note Handoff  Pending (specify):  Clinical improvement with O2  Family discussion: With jeovany Maharaj   Disposition: IPP

## 2022-09-21 LAB
BASOPHILS # BLD AUTO: 0.04 K/UL — SIGNIFICANT CHANGE UP (ref 0–0.2)
BASOPHILS NFR BLD AUTO: 0.5 % — SIGNIFICANT CHANGE UP (ref 0–1)
EOSINOPHIL # BLD AUTO: 0.55 K/UL — SIGNIFICANT CHANGE UP (ref 0–0.7)
EOSINOPHIL NFR BLD AUTO: 7 % — SIGNIFICANT CHANGE UP (ref 0–8)
HCT VFR BLD CALC: 34 % — LOW (ref 37–47)
HGB BLD-MCNC: 11.1 G/DL — LOW (ref 12–16)
IMM GRANULOCYTES NFR BLD AUTO: 0.3 % — SIGNIFICANT CHANGE UP (ref 0.1–0.3)
LYMPHOCYTES # BLD AUTO: 1.29 K/UL — SIGNIFICANT CHANGE UP (ref 1.2–3.4)
LYMPHOCYTES # BLD AUTO: 16.3 % — LOW (ref 20.5–51.1)
MCHC RBC-ENTMCNC: 29.1 PG — SIGNIFICANT CHANGE UP (ref 27–31)
MCHC RBC-ENTMCNC: 32.6 G/DL — SIGNIFICANT CHANGE UP (ref 32–37)
MCV RBC AUTO: 89 FL — SIGNIFICANT CHANGE UP (ref 81–99)
MONOCYTES # BLD AUTO: 0.89 K/UL — HIGH (ref 0.1–0.6)
MONOCYTES NFR BLD AUTO: 11.3 % — HIGH (ref 1.7–9.3)
NEUTROPHILS # BLD AUTO: 5.12 K/UL — SIGNIFICANT CHANGE UP (ref 1.4–6.5)
NEUTROPHILS NFR BLD AUTO: 64.6 % — SIGNIFICANT CHANGE UP (ref 42.2–75.2)
NRBC # BLD: 0 /100 WBCS — SIGNIFICANT CHANGE UP (ref 0–0)
PLATELET # BLD AUTO: 143 K/UL — SIGNIFICANT CHANGE UP (ref 130–400)
RBC # BLD: 3.82 M/UL — LOW (ref 4.2–5.4)
RBC # FLD: 13.2 % — SIGNIFICANT CHANGE UP (ref 11.5–14.5)
WBC # BLD: 7.91 K/UL — SIGNIFICANT CHANGE UP (ref 4.8–10.8)
WBC # FLD AUTO: 7.91 K/UL — SIGNIFICANT CHANGE UP (ref 4.8–10.8)

## 2022-09-21 PROCEDURE — 99231 SBSQ HOSP IP/OBS SF/LOW 25: CPT

## 2022-09-21 RX ORDER — ACETAMINOPHEN 500 MG
650 TABLET ORAL ONCE
Refills: 0 | Status: COMPLETED | OUTPATIENT
Start: 2022-09-21 | End: 2022-09-21

## 2022-09-21 RX ADMIN — FAMOTIDINE 20 MILLIGRAM(S): 10 INJECTION INTRAVENOUS at 17:50

## 2022-09-21 RX ADMIN — Medication 650 MILLIGRAM(S): at 23:02

## 2022-09-21 RX ADMIN — OLANZAPINE 15 MILLIGRAM(S): 15 TABLET, FILM COATED ORAL at 17:50

## 2022-09-21 RX ADMIN — RISPERIDONE 4 MILLIGRAM(S): 4 TABLET ORAL at 17:50

## 2022-09-21 RX ADMIN — Medication 1 MILLIGRAM(S): at 17:50

## 2022-09-21 RX ADMIN — FAMOTIDINE 20 MILLIGRAM(S): 10 INJECTION INTRAVENOUS at 09:28

## 2022-09-21 RX ADMIN — SIMVASTATIN 20 MILLIGRAM(S): 20 TABLET, FILM COATED ORAL at 21:08

## 2022-09-21 RX ADMIN — Medication 50 MILLIGRAM(S): at 14:03

## 2022-09-21 RX ADMIN — Medication 650 MILLIGRAM(S): at 22:32

## 2022-09-21 RX ADMIN — ZINC OXIDE 1 APPLICATION(S): 200 OINTMENT TOPICAL at 17:51

## 2022-09-21 RX ADMIN — Medication 50 MILLIGRAM(S): at 21:08

## 2022-09-21 RX ADMIN — Medication 50 MILLIGRAM(S): at 09:44

## 2022-09-21 RX ADMIN — RISPERIDONE 4 MILLIGRAM(S): 4 TABLET ORAL at 09:27

## 2022-09-21 RX ADMIN — Medication 5 MILLIGRAM(S): at 17:49

## 2022-09-21 RX ADMIN — Medication 50 MILLIGRAM(S): at 09:28

## 2022-09-21 RX ADMIN — ZINC OXIDE 1 APPLICATION(S): 200 OINTMENT TOPICAL at 05:50

## 2022-09-21 NOTE — PROGRESS NOTE ADULT - ASSESSMENT
66 yo female with a PMHx of COPD, GERD, and schizophrenia presents from [facility] for SOB.  #Acute hypoxic resp failure secondary to copd exaserbation  - patient saturating 99% on RA  - s/p duonebs and IV steroids  - cw albuterol 90mcg q6  - patient spiked a fever of 38.4c on 09/18, infectious workup was sent, patient is afebrile   - FU on Blood Cx  - patient is refusing IV line insertion, medications, blood test.   - labs were drawn today, WBC is 7.91, Hb drop to 11.1  - cw prednisone 40 mg PO OD, because patient is refusing IV line   - cw hydorxyzine, benztropine, olanzapine, respiredone   - FU procal   - FU psych recs     #Schizophrenia  - c/w current psychiatric medication regimen olanzapine + bid risperidone + benztropine+ hydroxyzine   - patient is ANOx3 still with delusional ideations  - patient accepted only 1 tube blood withdraw by the resident      #GERD  -c/w famotidine     HCM;:  - patient is refusing most of her medications

## 2022-09-22 VITALS
RESPIRATION RATE: 18 BRPM | HEART RATE: 80 BPM | TEMPERATURE: 97 F | DIASTOLIC BLOOD PRESSURE: 61 MMHG | SYSTOLIC BLOOD PRESSURE: 139 MMHG

## 2022-09-22 PROCEDURE — 99238 HOSP IP/OBS DSCHRG MGMT 30/<: CPT

## 2022-09-22 RX ADMIN — Medication 50 MILLIGRAM(S): at 13:44

## 2022-09-22 RX ADMIN — ALBUTEROL 2 PUFF(S): 90 AEROSOL, METERED ORAL at 14:26

## 2022-09-22 RX ADMIN — ZINC OXIDE 1 APPLICATION(S): 200 OINTMENT TOPICAL at 05:26

## 2022-09-22 RX ADMIN — Medication 50 MILLIGRAM(S): at 05:25

## 2022-09-22 RX ADMIN — FAMOTIDINE 20 MILLIGRAM(S): 10 INJECTION INTRAVENOUS at 05:25

## 2022-09-22 RX ADMIN — ALBUTEROL 2 PUFF(S): 90 AEROSOL, METERED ORAL at 08:50

## 2022-09-22 NOTE — PROGRESS NOTE ADULT - PROVIDER SPECIALTY LIST ADULT
Hospitalist
Hospitalist
Internal Medicine
Internal Medicine
Hospitalist
Hospitalist
Internal Medicine

## 2022-09-22 NOTE — DISCHARGE NOTE PROVIDER - HOSPITAL COURSE
ED:  66 yo female with a PMHx of COPD, GERD, and schizophrenia presents from [facility] for SOB. Patient started to feel SOB and was given a inhaler but was found to be 84% on RA. Patient  states her SOB started today and facility staff states she was well 1 day ptp. No recent COPD exacerbations. Patient denied any other symptoms including fevers, chill, headache, recent illness/travel, cough, abdominal pain, chest pain.  In the ED patient was tachypneic and tachycardic T(F): 98.6  HR: 114  BP: 137/80  RR: 28  SpO2: 99% on 3L, s/p duonebs and IV Solumedrol 125mg IV improved to 96% SpO2 on room air. Patient admitted to medicine for acute hypoxic respiratory failure likely secondary to copd exaserbation      #Acute hypoxic resp failure secondary to copd exaserbation  - patient saturating 99% on RA  - s/p duonebs and IV steroids  - given albuterol 90mcg q6  - patient spiked a fever of 38.4c on 09/18, infectious workup was sent,   - patient refused blood draws   - patient is refusing IV line insertion, medications, blood test.   - labs were drawn on 09/21 , WBC is 7.91, Hb drop to 11.1  - given prednisone 40 mg PO OD, because patient is refusing IV line   - given hydorxyzine, benztropine, olanzapine, respiredone   - FU psych OP     #Schizophrenia  - given current psychiatric medication regimen olanzapine + bid risperidone + benztropine+ hydroxyzine   - patient is ANOx3 still with delusional ideations  - patient accepted only 1 tube blood withdraw by the resident      #GERD  - given famotidine     HCM;:  - patient is refusing most of her medications

## 2022-09-22 NOTE — DISCHARGE NOTE PROVIDER - PROVIDER TOKENS
PROVIDER:[TOKEN:[[id number]],FOLLOWUP:[2 weeks]],PROVIDER:[TOKEN:[[id number]],FOLLOWUP:[2 weeks]]

## 2022-09-22 NOTE — PROGRESS NOTE ADULT - SUBJECTIVE AND OBJECTIVE BOX
GAGANDEEP MORRIS  67y  Female      Patient is a 67y old  Female who presents with a chief complaint of SOB    INTERVAL HPI/OVERNIGHT EVENTS:      ******************************* REVIEW OF SYSTEMS:**********************************************    All other review of systems negative    *********************** VITALS ******************************************    T(F): 101.1 (09-19-22 @ 05:56)  HR: 101 (09-19-22 @ 05:56) (80 - 101)  BP: 136/67 (09-19-22 @ 05:56) (132/68 - 136/67)  RR: 18 (09-19-22 @ 05:56) (18 - 18)  SpO2: 93% (09-19-22 @ 05:56) (93% - 93%)            ******************************** PHYSICAL EXAM:**************************************************  GENERAL: NAD    PSYCH: no agitation, baseline mentation  HEENT:     NERVOUS SYSTEM:  Alert & Oriented X3,   PULMONARY: NELSON, CTA    CARDIOVASCULAR: S1S2 RRR    GI: Soft, NT, ND; BS present.    EXTREMITIES:  2+ Peripheral Pulses, No clubbing, cyanosis, or edema    LYMPH: No lymphadenopathy noted    SKIN: No rashes or lesions      **************************** LABS *******************************************************                          13.9   14.06 )-----------( 158      ( 17 Sep 2022 19:30 )             42.0     09-17    140  |  105  |  20  ----------------------------<  98  5.2<H>   |  26  |  0.6<L>    Ca    9.5      17 Sep 2022 19:30    TPro  6.6  /  Alb  4.0  /  TBili  0.3  /  DBili  x   /  AST  21  /  ALT  13  /  AlkPhos  64  09-17          Lactate Trend    CARDIAC MARKERS ( 17 Sep 2022 19:30 )  x     / <0.01 ng/mL / x     / x     / x          CAPILLARY BLOOD GLUCOSE              **************************Active Medications *******************************************  eggs (Other (U))  fish (Other (U))  Navane (Other (U))  penicillins (Other (U))      ALBUTerol    90 MICROgram(s) HFA Inhaler 2 Puff(s) Inhalation every 6 hours  benztropine 1 milliGRAM(s) Oral at bedtime  bisacodyl 5 milliGRAM(s) Oral at bedtime  budesonide 160 MICROgram(s)/formoterol 4.5 MICROgram(s) Inhaler 2 Puff(s) Inhalation two times a day  enoxaparin Injectable 30 milliGRAM(s) SubCutaneous every 24 hours  famotidine    Tablet 20 milliGRAM(s) Oral two times a day  hydrOXYzine hydrochloride 50 milliGRAM(s) Oral every 8 hours  OLANZapine 15 milliGRAM(s) Oral daily  risperiDONE   Tablet 4 milliGRAM(s) Oral two times a day  simvastatin 20 milliGRAM(s) Oral at bedtime  zinc oxide 20% Ointment 1 Application(s) Topical two times a day      ***************************************************  RADIOLOGY & ADDITIONAL TESTS:    Imaging Personally Reviewed:  [ ] YES  [ ] NO    HEALTH ISSUES - PROBLEM Dx:      
  GAGANDEEP MORRIS  67y  Female      Patient is a 67y old  Female who presents with a chief complaint of SOB    INTERVAL HPI/OVERNIGHT EVENTS:      ******************************* REVIEW OF SYSTEMS:**********************************************    All other review of systems negative    *********************** VITALS ******************************************    T(F): 97.5 (09-20-22 @ 05:46)  HR: 71 (09-20-22 @ 05:46) (71 - 100)  BP: 108/53 (09-20-22 @ 05:46) (108/53 - 132/62)  RR: 18 (09-20-22 @ 05:46) (18 - 18)  SpO2: 98% (09-20-22 @ 05:46) (98% - 98%)            ******************************** PHYSICAL EXAM:**************************************************  GENERAL: NAD    PSYCH: no agitation, baseline mentation  HEENT:     NERVOUS SYSTEM:  Alert & Oriented X3,     PULMONARY: NELSON, CTA    CARDIOVASCULAR: S1S2 RRR    GI: Soft, NT, ND; BS present.    EXTREMITIES:  2+ Peripheral Pulses, No clubbing, cyanosis, or edema    LYMPH: No lymphadenopathy noted    SKIN: No rashes or lesions      **************************** LABS *******************************************************                  Lactate Trend        CAPILLARY BLOOD GLUCOSE              **************************Active Medications *******************************************  eggs (Other (U))  fish (Other (U))  Navane (Other (U))  penicillins (Other (U))      ALBUTerol    90 MICROgram(s) HFA Inhaler 2 Puff(s) Inhalation every 6 hours  benztropine 1 milliGRAM(s) Oral at bedtime  bisacodyl 5 milliGRAM(s) Oral at bedtime  budesonide 160 MICROgram(s)/formoterol 4.5 MICROgram(s) Inhaler 2 Puff(s) Inhalation two times a day  enoxaparin Injectable 30 milliGRAM(s) SubCutaneous every 24 hours  famotidine    Tablet 20 milliGRAM(s) Oral two times a day  hydrOXYzine hydrochloride 50 milliGRAM(s) Oral every 8 hours  OLANZapine 15 milliGRAM(s) Oral daily  predniSONE   Tablet 50 milliGRAM(s) Oral daily  risperiDONE   Tablet 4 milliGRAM(s) Oral two times a day  simvastatin 20 milliGRAM(s) Oral at bedtime  zinc oxide 20% Ointment 1 Application(s) Topical two times a day      ***************************************************  RADIOLOGY & ADDITIONAL TESTS:    Imaging Personally Reviewed:  [ ] YES  [ ] NO    HEALTH ISSUES - PROBLEM Dx:      
SUBJECTIVE / OVERNIGHT EVENTS  Patient slept well overnight. No acute complaints this AM. Patient does not report fevers, chills, CP or n/v/d. patient on 4 L Nc     MEDICATIONS  ALBUTerol    90 MICROgram(s) HFA Inhaler 2 Puff(s) Inhalation every 6 hours  benztropine 1 milliGRAM(s) Oral at bedtime  bisacodyl 5 milliGRAM(s) Oral at bedtime  budesonide 160 MICROgram(s)/formoterol 4.5 MICROgram(s) Inhaler 2 Puff(s) Inhalation two times a day  enoxaparin Injectable 30 milliGRAM(s) SubCutaneous every 24 hours  famotidine    Tablet 20 milliGRAM(s) Oral two times a day  hydrOXYzine hydrochloride 50 milliGRAM(s) Oral every 8 hours  OLANZapine 15 milliGRAM(s) Oral daily  risperiDONE   Tablet 4 milliGRAM(s) Oral two times a day  simvastatin 20 milliGRAM(s) Oral at bedtime  zinc oxide 20% Ointment 1 Application(s) Topical two times a day      VITALS /  EXAM    T(C): 38.4 (09-19-22 @ 05:56), Max: 38.4 (09-19-22 @ 05:56)  HR: 101 (09-19-22 @ 05:56) (80 - 101)  BP: 136/67 (09-19-22 @ 05:56) (132/68 - 136/67)  RR: 18 (09-19-22 @ 05:56) (18 - 18)  SpO2: 93% (09-19-22 @ 05:56) (93% - 93%)    GENERAL: NAD, well-developed  CHEST/LUNG: Clear to auscultation bilaterally; No wheezes, rales or rhonchi  HEART: Regular rate and rhythm; No murmurs, rubs, or gallops  ABDOMEN: Soft, Nontender, Nondistended; Bowel sounds present, no masses.  EXTREMITIES:  2+ Peripheral Pulses, No clubbing, cyanosis, or edema    I's & O's     LABS             13.9   14.06 )-----------( 158      ( 09-17-22 @ 19:30 )             42.0     140  |  105  |  20  -------------------------<  98   09-17-22 @ 19:30  5.2  |  26  |  0.6    Ca      9.5     09-17-22 @ 19:30    TPro  6.6  /  Alb  4.0  /  TBili  0.3  /  DBili  x   /  AST  21  /  ALT  13  /  AlkPhos  64  /  GGT  x     09-17-22 @ 19:30      Troponin T, Serum: <0.01 ng/mL (09-17-22 @ 19:30)          Serum Pro-Brain Natriuretic Peptide: 199 pg/mL (09-17-22 @ 19:30)      Blood Gas Venous - Lactate: 1.00 mmol/L (09-17-22 @ 19:57)      MICRO / IMAGING / CARDIOLOGY  Telemetry: Reviewed   EKG: Reviewed    CULTURES    IMAGING  PACS Image:  (09-17-22 @ 19:43)    CARDIOLOGY  
SUBJECTIVE / OVERNIGHT EVENTS  Patient slept well overnight. No acute complaints this AM. Patient does not report fevers, chills, CP, SOB, or n/v/d. patient refused her meds overnight     MEDICATIONS  ALBUTerol    90 MICROgram(s) HFA Inhaler 2 Puff(s) Inhalation every 6 hours  benztropine 1 milliGRAM(s) Oral at bedtime  bisacodyl 5 milliGRAM(s) Oral at bedtime  budesonide 160 MICROgram(s)/formoterol 4.5 MICROgram(s) Inhaler 2 Puff(s) Inhalation two times a day  enoxaparin Injectable 30 milliGRAM(s) SubCutaneous every 24 hours  famotidine    Tablet 20 milliGRAM(s) Oral two times a day  hydrOXYzine hydrochloride 50 milliGRAM(s) Oral every 8 hours  OLANZapine 15 milliGRAM(s) Oral daily  predniSONE   Tablet 50 milliGRAM(s) Oral daily  risperiDONE   Tablet 4 milliGRAM(s) Oral two times a day  simvastatin 20 milliGRAM(s) Oral at bedtime  zinc oxide 20% Ointment 1 Application(s) Topical two times a day      VITALS /  EXAM    T(C): 35.7 (09-21-22 @ 05:00), Max: 35.7 (09-21-22 @ 05:00)  HR: 67 (09-21-22 @ 05:00) (67 - 89)  BP: 115/56 (09-21-22 @ 05:00) (115/56 - 124/59)  RR: 18 (09-21-22 @ 05:00) (18 - 19)  SpO2: 99% (09-21-22 @ 05:00) (99% - 99%)    GENERAL: NAD, well-developed  CHEST/LUNG: Clear to auscultation bilaterally; No wheezes, rales or rhonchi  HEART: Regular rate and rhythm; No murmurs, rubs, or gallops  ABDOMEN: Soft, Nontender, Nondistended; Bowel sounds present, no masses.  EXTREMITIES:  2+ Peripheral Pulses, No clubbing, cyanosis, or edema    I's & O's     LABS             11.1   7.91  )-----------( 143      ( 09-21-22 @ 11:46 )             34.0                             MICRO / IMAGING / CARDIOLOGY  Telemetry: Reviewed   EKG: Reviewed    CULTURES    IMAGING    CARDIOLOGY  
pt seen and examined.   feels better, has no complaints no sob, wants to be left alone.     ROS: no cp, no sob, no n/v, no fever    PAST MEDICAL & SURGICAL HISTORY:  Schizophrenia    Chronic obstructive lung disease    Chronic GERD          Vital Signs Last 24 Hrs  T(C): 35.7 (21 Sep 2022 05:00), Max: 35.7 (21 Sep 2022 05:00)  T(F): 96.2 (21 Sep 2022 05:00), Max: 96.2 (21 Sep 2022 05:00)  HR: 67 (21 Sep 2022 05:00) (67 - 89)  BP: 115/56 (21 Sep 2022 05:00) (115/56 - 124/59)  BP(mean): --  RR: 18 (21 Sep 2022 05:00) (18 - 19)  SpO2: 99% (21 Sep 2022 05:00) (99% - 99%)    Parameters below as of 21 Sep 2022 05:00  Patient On (Oxygen Delivery Method): room air        physical exam  constitutional NAD, AA, Respiratory  lungs CTA, CVS heart RRR, GI: abdomen Soft NT, ND, BS+, skin: intact  neuro exam Motor, sensory and CN normal, no deficit     MEDICATIONS  (STANDING):  ALBUTerol    90 MICROgram(s) HFA Inhaler 2 Puff(s) Inhalation every 6 hours  benztropine 1 milliGRAM(s) Oral at bedtime  bisacodyl 5 milliGRAM(s) Oral at bedtime  budesonide 160 MICROgram(s)/formoterol 4.5 MICROgram(s) Inhaler 2 Puff(s) Inhalation two times a day  enoxaparin Injectable 30 milliGRAM(s) SubCutaneous every 24 hours  famotidine    Tablet 20 milliGRAM(s) Oral two times a day  hydrOXYzine hydrochloride 50 milliGRAM(s) Oral every 8 hours  OLANZapine 15 milliGRAM(s) Oral daily  predniSONE   Tablet 50 milliGRAM(s) Oral daily  risperiDONE   Tablet 4 milliGRAM(s) Oral two times a day  simvastatin 20 milliGRAM(s) Oral at bedtime  zinc oxide 20% Ointment 1 Application(s) Topical two times a day    MEDICATIONS  (PRN):    Troponin T, Serum: <0.01 ng/mL (09.17.22 @ 19:30)                            11.1   7.91  )-----------( 143      ( 21 Sep 2022 11:46 )             34.0     < from: Xray Chest 1 View-PORTABLE IMMEDIATE (09.17.22 @ 19:43) >  There is no evidence of focal consolidation, pleural effusion or   pneumothorax.    < end of copied text >    a/p  # copd exac, vs acute bronchitis, received zithromax x1,  no need for steroids , dc   # schizophrenia, stable , pt is refusing meds, and care but this is her baseline   # dyslipidemia, cont meds     [facility] physician Dr Drew ([phone]), accepted pt, but pt cannot go back after 4 due to staffing issues,     #Progress Note Handoff  Pending (specify): dc to [facility]   Family discussion: [facility] physician , resident will update family   Disposition: [place]                               
pt seen and examined.   no new complaints     ROS: no cp, no sob, no n/v, no fever    PAST MEDICAL & SURGICAL HISTORY:  Schizophrenia  Chronic obstructive lung disease  Chronic GERD    Vital Signs Last 24 Hrs  T(C): 35.8 (22 Sep 2022 05:17), Max: 35.8 (21 Sep 2022 14:07)  T(F): 96.5 (22 Sep 2022 05:17), Max: 96.5 (21 Sep 2022 14:07)  HR: 73 (22 Sep 2022 05:17) (70 - 89)  BP: 149/65 (22 Sep 2022 05:17) (120/58 - 149/65)  BP(mean): --  RR: 18 (22 Sep 2022 05:17) (18 - 18)  SpO2: 93% (22 Sep 2022 07:45) (93% - 99%)    Parameters below as of 22 Sep 2022 07:45  Patient On (Oxygen Delivery Method): room air        physical exam  constitutional NAD, today pt is very alert and sitting in chair and eating , Respiratory  lungs CTA, CVS heart RRR, GI: abdomen Soft NT, ND, BS+, skin: intact  neuro exam Motor, sensory and CN normal, no deficit     MEDICATIONS  (STANDING):  ALBUTerol    90 MICROgram(s) HFA Inhaler 2 Puff(s) Inhalation every 6 hours  benztropine 1 milliGRAM(s) Oral at bedtime  bisacodyl 5 milliGRAM(s) Oral at bedtime  budesonide 160 MICROgram(s)/formoterol 4.5 MICROgram(s) Inhaler 2 Puff(s) Inhalation two times a day  enoxaparin Injectable 30 milliGRAM(s) SubCutaneous every 24 hours  famotidine    Tablet 20 milliGRAM(s) Oral two times a day  hydrOXYzine hydrochloride 50 milliGRAM(s) Oral every 8 hours  OLANZapine 15 milliGRAM(s) Oral daily  predniSONE   Tablet 50 milliGRAM(s) Oral daily  risperiDONE   Tablet 4 milliGRAM(s) Oral two times a day  simvastatin 20 milliGRAM(s) Oral at bedtime  zinc oxide 20% Ointment 1 Application(s) Topical two times a day                        11.1   7.91  )-----------( 143      ( 21 Sep 2022 11:46 )             34.0   a/p  < from: Xray Chest 1 View-PORTABLE IMMEDIATE (09.17.22 @ 19:43) >  There is no evidence of focal consolidation, pleural effusion or   pneumothorax.    < end of copied text >    a/p  # copd exac, vs acute bronchitis, received zithromax x1,  no need for steroids , dc   # schizophrenia, stable , pt is refusing meds, and care but this is her baseline   # dyslipidemia, cont meds     [facility] physician Dr Drew ([phone]), accepted pt.   #Progress Note Handoff  Pending (specify): dc to [facility] today    Family discussion:  physician , resident will update family   Disposition: [place]   time spent 35 min                                     
SUBJECTIVE / OVERNIGHT EVENTS  Patient slept well overnight. No acute complaints this AM. Patient does not report fevers, chills, CP, SOB, or n/v/d. patient is ANOx3. patient is delusional she says "Save [place] it is in danger"    MEDICATIONS  ALBUTerol    90 MICROgram(s) HFA Inhaler 2 Puff(s) Inhalation every 6 hours  benztropine 1 milliGRAM(s) Oral at bedtime  bisacodyl 5 milliGRAM(s) Oral at bedtime  budesonide 160 MICROgram(s)/formoterol 4.5 MICROgram(s) Inhaler 2 Puff(s) Inhalation two times a day  enoxaparin Injectable 30 milliGRAM(s) SubCutaneous every 24 hours  famotidine    Tablet 20 milliGRAM(s) Oral two times a day  hydrOXYzine hydrochloride 50 milliGRAM(s) Oral every 8 hours  OLANZapine 15 milliGRAM(s) Oral daily  predniSONE   Tablet 50 milliGRAM(s) Oral daily  risperiDONE   Tablet 4 milliGRAM(s) Oral two times a day  simvastatin 20 milliGRAM(s) Oral at bedtime  zinc oxide 20% Ointment 1 Application(s) Topical two times a day      VITALS /  EXAM    T(C): 36.4 (09-20-22 @ 05:46), Max: 36.9 (09-19-22 @ 13:40)  HR: 71 (09-20-22 @ 05:46) (71 - 100)  BP: 108/53 (09-20-22 @ 05:46) (108/53 - 132/62)  RR: 18 (09-20-22 @ 05:46) (18 - 18)  SpO2: 98% (09-20-22 @ 05:46) (98% - 98%)    GENERAL: NAD, well-developed, ANOx3, yet delusional   CHEST/LUNG: Clear to auscultation bilaterally; No wheezes, rales or rhonchi  HEART: Regular rate and rhythm; No murmurs, rubs, or gallops  ABDOMEN: Soft, Nontender, Nondistended; Bowel sounds present, no masses.  EXTREMITIES:  2+ Peripheral Pulses, No clubbing, cyanosis, or edema    I's & O's     LABS            Troponin T, Serum: <0.01 ng/mL (09-17-22 @ 19:30)          Serum Pro-Brain Natriuretic Peptide: 199 pg/mL (09-17-22 @ 19:30)      Blood Gas Venous - Lactate: 1.00 mmol/L (09-17-22 @ 19:57)      MICRO / IMAGING / CARDIOLOGY  Telemetry: Reviewed   EKG: Reviewed    CULTURES    IMAGING    CARDIOLOGY

## 2022-09-22 NOTE — DISCHARGE NOTE PROVIDER - CARE PROVIDERS DIRECT ADDRESSES
[email or web address]

## 2022-09-22 NOTE — DISCHARGE NOTE PROVIDER - CARE PROVIDER_API CALL
Laurence Salguero  Internal Medicine  [address] [place] [address]  Phone: ()-  Fax: ()-  Follow Up Time: 2 weeks    Kunal Capps)  Psychiatry  [address] [place] [address]  Phone: [phone]  Fax: [phone]  Follow Up Time: 2 weeks

## 2022-09-22 NOTE — DISCHARGE NOTE PROVIDER - NSDCFUADDAPPT_GEN_ALL_CORE_FT
Please follow up with your primary care physician within two weeks after discharge to update them on your recent hospitalization and to review any changes in your medications.  Please followup with Dr. Capps the Psychiatrist in 2 weeks    Please follow up with your primary care physician within two weeks after discharge to update them on your recent hospitalization and to review any changes in your medications.  Please followup with Dr. Capps the Psychiatrist in 2 weeks   Followup with [facility] in 1 week

## 2022-09-22 NOTE — DISCHARGE NOTE NURSING/CASE MANAGEMENT/SOCIAL WORK - NSDCFUADDAPPT_GEN_ALL_CORE_FT
Please follow up with your primary care physician within two weeks after discharge to update them on your recent hospitalization and to review any changes in your medications.  Please followup with Dr. Capps the Psychiatrist in 2 weeks   Followup with [facility] in 1 week

## 2022-09-22 NOTE — DISCHARGE NOTE PROVIDER - NSDCCPCAREPLAN_GEN_ALL_CORE_FT
PRINCIPAL DISCHARGE DIAGNOSIS  Diagnosis: COPD exacerbation  Assessment and Plan of Treatment: you can to the hospital for shortness of breath. this is known as COPd exacerbation. Chronic obstructive pulmonary disease (COPD) is a disease that limits the flow of air in and out of your lungs. This makes it harder to breathe. With COPD, you are also more likely to get lung infections. COPD includes chronic bronchitis and emphysema. COPD is most often caused by heavy, long-term cigarette smoking. If you smoke, quit. It is the best thing you can do for your COPD and your overall health. To avoid infections, wash your hands often. Do your best to keep your hands away from your face. Most germs are spread from your hands to your mouth. Get a flu shot every year. Also ask your provider about pneumonia vaccines. To stay healthy, get enough sleep, exercise regularly, and eat a balanced diet.  Take your medicines exactly as directed. Don't skip doses.  Call your provider immediately if you have any of the following:  Shortness of breath, wheezing, or coughing  Increased mucus  Yellow, green, bloody, or smelly mucus  Fever or chills  Tightness in your chest that does not go away with rest or medicine  An irregular heartbeat or a feeling that your heart is beating very fast  Swollen ankles.

## 2022-09-22 NOTE — DISCHARGE NOTE NURSING/CASE MANAGEMENT/SOCIAL WORK - PATIENT PORTAL LINK FT
You can access the FollowMyHealth Patient Portal offered by [facility] by registering at the following website: [email or web address]. By joining [employer]’s FollowMyHealth portal, you will also be able to view your health information using other applications (apps) compatible with our system.

## 2022-09-22 NOTE — DISCHARGE NOTE NURSING/CASE MANAGEMENT/SOCIAL WORK - NSDCPEFALRISK_GEN_ALL_CORE
For information on Fall & Injury Prevention, visit: [email or web address] OR  [email or web address] OR  https://www.cdc.gov/steadi/patient.html

## 2022-09-27 DIAGNOSIS — Z79.899 OTHER LONG TERM (CURRENT) DRUG THERAPY: ICD-10-CM

## 2022-09-27 DIAGNOSIS — E78.5 HYPERLIPIDEMIA, UNSPECIFIED: ICD-10-CM

## 2022-09-27 DIAGNOSIS — K21.9 GASTRO-ESOPHAGEAL REFLUX DISEASE WITHOUT ESOPHAGITIS: ICD-10-CM

## 2022-09-27 DIAGNOSIS — Z88.0 ALLERGY STATUS TO PENICILLIN: ICD-10-CM

## 2022-09-27 DIAGNOSIS — Z91.013 ALLERGY TO SEAFOOD: ICD-10-CM

## 2022-09-27 DIAGNOSIS — Z91.14 PATIENT'S OTHER NONCOMPLIANCE WITH MEDICATION REGIMEN: ICD-10-CM

## 2022-09-27 DIAGNOSIS — F20.9 SCHIZOPHRENIA, UNSPECIFIED: ICD-10-CM

## 2022-09-27 DIAGNOSIS — R50.9 FEVER, UNSPECIFIED: ICD-10-CM

## 2022-09-27 DIAGNOSIS — J44.1 CHRONIC OBSTRUCTIVE PULMONARY DISEASE WITH (ACUTE) EXACERBATION: ICD-10-CM

## 2022-09-27 DIAGNOSIS — Z91.19 PATIENT'S NONCOMPLIANCE WITH OTHER MEDICAL TREATMENT AND REGIMEN: ICD-10-CM

## 2022-09-27 DIAGNOSIS — Z81.8 FAMILY HISTORY OF OTHER MENTAL AND BEHAVIORAL DISORDERS: ICD-10-CM

## 2022-09-27 DIAGNOSIS — J96.01 ACUTE RESPIRATORY FAILURE WITH HYPOXIA: ICD-10-CM

## 2022-09-27 DIAGNOSIS — Z87.891 PERSONAL HISTORY OF NICOTINE DEPENDENCE: ICD-10-CM

## 2022-09-27 DIAGNOSIS — Z91.012 ALLERGY TO EGGS: ICD-10-CM

## 2022-09-27 DIAGNOSIS — Z88.8 ALLERGY STATUS TO OTHER DRUGS, MEDICAMENTS AND BIOLOGICAL SUBSTANCES: ICD-10-CM

## 2022-10-06 DIAGNOSIS — Z91.19 PATIENT'S NONCOMPLIANCE WITH OTHER MEDICAL TREATMENT AND REGIMEN: ICD-10-CM

## 2022-12-10 ENCOUNTER — INPATIENT (INPATIENT)
Facility: HOSPITAL | Age: 67
LOS: 3 days | Discharge: HOME | End: 2022-12-14
Attending: HOSPITALIST | Admitting: HOSPITALIST

## 2022-12-10 VITALS
SYSTOLIC BLOOD PRESSURE: 139 MMHG | HEIGHT: 64 IN | DIASTOLIC BLOOD PRESSURE: 91 MMHG | OXYGEN SATURATION: 95 % | TEMPERATURE: 98 F | WEIGHT: 154.98 LBS | HEART RATE: 119 BPM | RESPIRATION RATE: 20 BRPM

## 2022-12-10 LAB
ALBUMIN SERPL ELPH-MCNC: 3.9 G/DL — SIGNIFICANT CHANGE UP (ref 3.5–5.2)
ALP SERPL-CCNC: 60 U/L — SIGNIFICANT CHANGE UP (ref 30–115)
ALT FLD-CCNC: 14 U/L — SIGNIFICANT CHANGE UP (ref 0–41)
ANION GAP SERPL CALC-SCNC: 10 MMOL/L — SIGNIFICANT CHANGE UP (ref 7–14)
APTT BLD: 31.7 SEC — SIGNIFICANT CHANGE UP (ref 27–39.2)
AST SERPL-CCNC: 12 U/L — SIGNIFICANT CHANGE UP (ref 0–41)
BASE EXCESS BLDV CALC-SCNC: 1.1 MMOL/L — SIGNIFICANT CHANGE UP (ref -2–3)
BASOPHILS # BLD AUTO: 0.03 K/UL — SIGNIFICANT CHANGE UP (ref 0–0.2)
BASOPHILS NFR BLD AUTO: 0.2 % — SIGNIFICANT CHANGE UP (ref 0–1)
BILIRUB SERPL-MCNC: 0.3 MG/DL — SIGNIFICANT CHANGE UP (ref 0.2–1.2)
BUN SERPL-MCNC: 17 MG/DL — SIGNIFICANT CHANGE UP (ref 10–20)
CA-I SERPL-SCNC: 1.26 MMOL/L — SIGNIFICANT CHANGE UP (ref 1.15–1.33)
CALCIUM SERPL-MCNC: 9.2 MG/DL — SIGNIFICANT CHANGE UP (ref 8.4–10.5)
CHLORIDE SERPL-SCNC: 104 MMOL/L — SIGNIFICANT CHANGE UP (ref 98–110)
CO2 SERPL-SCNC: 28 MMOL/L — SIGNIFICANT CHANGE UP (ref 17–32)
CREAT SERPL-MCNC: 0.7 MG/DL — SIGNIFICANT CHANGE UP (ref 0.7–1.5)
EGFR: 94 ML/MIN/1.73M2 — SIGNIFICANT CHANGE UP
EOSINOPHIL # BLD AUTO: 0.09 K/UL — SIGNIFICANT CHANGE UP (ref 0–0.7)
EOSINOPHIL NFR BLD AUTO: 0.6 % — SIGNIFICANT CHANGE UP (ref 0–8)
GAS PNL BLDV: 136 MMOL/L — SIGNIFICANT CHANGE UP (ref 136–145)
GAS PNL BLDV: SIGNIFICANT CHANGE UP
GAS PNL BLDV: SIGNIFICANT CHANGE UP
GLUCOSE SERPL-MCNC: 125 MG/DL — HIGH (ref 70–99)
HCO3 BLDV-SCNC: 29 MMOL/L — SIGNIFICANT CHANGE UP (ref 22–29)
HCT VFR BLD CALC: 38.2 % — SIGNIFICANT CHANGE UP (ref 37–47)
HCT VFR BLDA CALC: 52 % — HIGH (ref 39–51)
HGB BLD CALC-MCNC: 17.4 G/DL — SIGNIFICANT CHANGE UP (ref 12.6–17.4)
HGB BLD-MCNC: 12.3 G/DL — SIGNIFICANT CHANGE UP (ref 12–16)
IMM GRANULOCYTES NFR BLD AUTO: 0.4 % — HIGH (ref 0.1–0.3)
INR BLD: 1 RATIO — SIGNIFICANT CHANGE UP (ref 0.65–1.3)
LACTATE BLDV-MCNC: 1.3 MMOL/L — SIGNIFICANT CHANGE UP (ref 0.5–2)
LYMPHOCYTES # BLD AUTO: 1.79 K/UL — SIGNIFICANT CHANGE UP (ref 1.2–3.4)
LYMPHOCYTES # BLD AUTO: 11.3 % — LOW (ref 20.5–51.1)
MCHC RBC-ENTMCNC: 28.8 PG — SIGNIFICANT CHANGE UP (ref 27–31)
MCHC RBC-ENTMCNC: 32.2 G/DL — SIGNIFICANT CHANGE UP (ref 32–37)
MCV RBC AUTO: 89.5 FL — SIGNIFICANT CHANGE UP (ref 81–99)
MONOCYTES # BLD AUTO: 0.95 K/UL — HIGH (ref 0.1–0.6)
MONOCYTES NFR BLD AUTO: 6 % — SIGNIFICANT CHANGE UP (ref 1.7–9.3)
NEUTROPHILS # BLD AUTO: 12.9 K/UL — HIGH (ref 1.4–6.5)
NEUTROPHILS NFR BLD AUTO: 81.5 % — HIGH (ref 42.2–75.2)
NRBC # BLD: 0 /100 WBCS — SIGNIFICANT CHANGE UP (ref 0–0)
NT-PROBNP SERPL-SCNC: 107 PG/ML — SIGNIFICANT CHANGE UP (ref 0–300)
PCO2 BLDV: 58 MMHG — HIGH (ref 39–42)
PH BLDV: 7.31 — LOW (ref 7.32–7.43)
PLATELET # BLD AUTO: 204 K/UL — SIGNIFICANT CHANGE UP (ref 130–400)
PO2 BLDV: 47 MMHG — SIGNIFICANT CHANGE UP
POTASSIUM BLDV-SCNC: 4.2 MMOL/L — SIGNIFICANT CHANGE UP (ref 3.5–5.1)
POTASSIUM SERPL-MCNC: 4.4 MMOL/L — SIGNIFICANT CHANGE UP (ref 3.5–5)
POTASSIUM SERPL-SCNC: 4.4 MMOL/L — SIGNIFICANT CHANGE UP (ref 3.5–5)
PROT SERPL-MCNC: 6.1 G/DL — SIGNIFICANT CHANGE UP (ref 6–8)
PROTHROM AB SERPL-ACNC: 11.4 SEC — SIGNIFICANT CHANGE UP (ref 9.95–12.87)
RBC # BLD: 4.27 M/UL — SIGNIFICANT CHANGE UP (ref 4.2–5.4)
RBC # FLD: 13.6 % — SIGNIFICANT CHANGE UP (ref 11.5–14.5)
SAO2 % BLDV: 74.6 % — SIGNIFICANT CHANGE UP
SODIUM SERPL-SCNC: 142 MMOL/L — SIGNIFICANT CHANGE UP (ref 135–146)
TROPONIN T SERPL-MCNC: <0.01 NG/ML — SIGNIFICANT CHANGE UP
WBC # BLD: 15.83 K/UL — HIGH (ref 4.8–10.8)
WBC # FLD AUTO: 15.83 K/UL — HIGH (ref 4.8–10.8)

## 2022-12-10 PROCEDURE — 71275 CT ANGIOGRAPHY CHEST: CPT | Mod: 26,MA

## 2022-12-10 PROCEDURE — 99223 1ST HOSP IP/OBS HIGH 75: CPT

## 2022-12-10 PROCEDURE — 93010 ELECTROCARDIOGRAM REPORT: CPT

## 2022-12-10 PROCEDURE — 71045 X-RAY EXAM CHEST 1 VIEW: CPT | Mod: 26

## 2022-12-10 PROCEDURE — 99285 EMERGENCY DEPT VISIT HI MDM: CPT | Mod: FS,CS

## 2022-12-10 RX ORDER — IPRATROPIUM/ALBUTEROL SULFATE 18-103MCG
3 AEROSOL WITH ADAPTER (GRAM) INHALATION ONCE
Refills: 0 | Status: COMPLETED | OUTPATIENT
Start: 2022-12-10 | End: 2022-12-10

## 2022-12-10 RX ORDER — FAMOTIDINE 10 MG/ML
20 INJECTION INTRAVENOUS
Refills: 0 | Status: DISCONTINUED | OUTPATIENT
Start: 2022-12-10 | End: 2022-12-14

## 2022-12-10 RX ORDER — ALBUTEROL 90 UG/1
2 AEROSOL, METERED ORAL EVERY 6 HOURS
Refills: 0 | Status: DISCONTINUED | OUTPATIENT
Start: 2022-12-10 | End: 2022-12-14

## 2022-12-10 RX ORDER — BENZTROPINE MESYLATE 1 MG
1 TABLET ORAL
Qty: 0 | Refills: 0 | DISCHARGE

## 2022-12-10 RX ORDER — ENOXAPARIN SODIUM 100 MG/ML
40 INJECTION SUBCUTANEOUS EVERY 24 HOURS
Refills: 0 | Status: DISCONTINUED | OUTPATIENT
Start: 2022-12-10 | End: 2022-12-14

## 2022-12-10 RX ORDER — HALOPERIDOL DECANOATE 100 MG/ML
5 INJECTION INTRAMUSCULAR ONCE
Refills: 0 | Status: COMPLETED | OUTPATIENT
Start: 2022-12-10 | End: 2022-12-10

## 2022-12-10 RX ORDER — LANOLIN ALCOHOL/MO/W.PET/CERES
3 CREAM (GRAM) TOPICAL AT BEDTIME
Refills: 0 | Status: DISCONTINUED | OUTPATIENT
Start: 2022-12-10 | End: 2022-12-14

## 2022-12-10 RX ORDER — RISPERIDONE 4 MG/1
1 TABLET ORAL
Qty: 0 | Refills: 0 | DISCHARGE

## 2022-12-10 RX ORDER — OLANZAPINE 15 MG/1
10 TABLET, FILM COATED ORAL AT BEDTIME
Refills: 0 | Status: DISCONTINUED | OUTPATIENT
Start: 2022-12-10 | End: 2022-12-14

## 2022-12-10 RX ORDER — SIMVASTATIN 20 MG/1
20 TABLET, FILM COATED ORAL AT BEDTIME
Refills: 0 | Status: DISCONTINUED | OUTPATIENT
Start: 2022-12-10 | End: 2022-12-14

## 2022-12-10 RX ORDER — ZINC OXIDE 200 MG/G
1 OINTMENT TOPICAL
Qty: 0 | Refills: 0 | DISCHARGE

## 2022-12-10 RX ORDER — ACETAMINOPHEN 500 MG
650 TABLET ORAL EVERY 6 HOURS
Refills: 0 | Status: DISCONTINUED | OUTPATIENT
Start: 2022-12-10 | End: 2022-12-14

## 2022-12-10 RX ORDER — TIOTROPIUM BROMIDE 18 UG/1
2 CAPSULE ORAL; RESPIRATORY (INHALATION) DAILY
Refills: 0 | Status: DISCONTINUED | OUTPATIENT
Start: 2022-12-10 | End: 2022-12-14

## 2022-12-10 RX ORDER — OLANZAPINE 15 MG/1
10 TABLET, FILM COATED ORAL
Qty: 0 | Refills: 0 | DISCHARGE

## 2022-12-10 RX ORDER — OLANZAPINE 15 MG/1
15 TABLET, FILM COATED ORAL DAILY
Refills: 0 | Status: DISCONTINUED | OUTPATIENT
Start: 2022-12-10 | End: 2022-12-14

## 2022-12-10 RX ORDER — ARIPIPRAZOLE 15 MG/1
20 TABLET ORAL DAILY
Refills: 0 | Status: DISCONTINUED | OUTPATIENT
Start: 2022-12-10 | End: 2022-12-14

## 2022-12-10 RX ORDER — HALOPERIDOL DECANOATE 100 MG/ML
1 INJECTION INTRAMUSCULAR
Qty: 0 | Refills: 0 | DISCHARGE

## 2022-12-10 RX ORDER — BUDESONIDE AND FORMOTEROL FUMARATE DIHYDRATE 160; 4.5 UG/1; UG/1
2 AEROSOL RESPIRATORY (INHALATION)
Refills: 0 | Status: DISCONTINUED | OUTPATIENT
Start: 2022-12-10 | End: 2022-12-14

## 2022-12-10 RX ORDER — ARIPIPRAZOLE 15 MG/1
1 TABLET ORAL
Qty: 0 | Refills: 0 | DISCHARGE

## 2022-12-10 RX ORDER — HALOPERIDOL DECANOATE 100 MG/ML
5 INJECTION INTRAMUSCULAR DAILY
Refills: 0 | Status: DISCONTINUED | OUTPATIENT
Start: 2022-12-10 | End: 2022-12-14

## 2022-12-10 RX ORDER — OLANZAPINE 15 MG/1
30 TABLET, FILM COATED ORAL ONCE
Refills: 0 | Status: COMPLETED | OUTPATIENT
Start: 2022-12-10 | End: 2022-12-10

## 2022-12-10 RX ORDER — HYDROXYZINE HCL 10 MG
50 TABLET ORAL EVERY 8 HOURS
Refills: 0 | Status: DISCONTINUED | OUTPATIENT
Start: 2022-12-10 | End: 2022-12-14

## 2022-12-10 RX ORDER — LEVOFLOXACIN 5 MG/ML
750 INJECTION, SOLUTION INTRAVENOUS EVERY 24 HOURS
Refills: 0 | Status: DISCONTINUED | OUTPATIENT
Start: 2022-12-11 | End: 2022-12-11

## 2022-12-10 RX ORDER — ONDANSETRON 8 MG/1
4 TABLET, FILM COATED ORAL EVERY 8 HOURS
Refills: 0 | Status: DISCONTINUED | OUTPATIENT
Start: 2022-12-10 | End: 2022-12-14

## 2022-12-10 RX ADMIN — Medication 60 MILLIGRAM(S): at 18:48

## 2022-12-10 RX ADMIN — Medication 3 MILLILITER(S): at 18:48

## 2022-12-10 RX ADMIN — HALOPERIDOL DECANOATE 5 MILLIGRAM(S): 100 INJECTION INTRAMUSCULAR at 19:34

## 2022-12-10 RX ADMIN — Medication 2 MILLIGRAM(S): at 19:34

## 2022-12-10 NOTE — ED PROVIDER NOTE - NS ED MD TWO NIGHTS YN
Post-Care Instructions: I reviewed with the patient in detail post-care instructions. Patient is to wear sunprotection, and avoid picking at any of the treated lesions. Pt may apply Vaseline to crusted or scabbing areas. Render Post-Care Instructions In Note?: yes Detail Level: Detailed Consent: The patient's verbal consent was obtained including but not limited to risks of crusting, scabbing, blistering, scarring, darker or lighter pigmentary change, recurrence, incomplete removal and infection. Number Of Freeze-Thaw Cycles: 2 freeze-thaw cycles Render Note In Bullet Format When Appropriate: No Duration Of Freeze Thaw-Cycle (Seconds): 0 Yes

## 2022-12-10 NOTE — ED PROVIDER NOTE - CHIEF COMPLAINT
Daily Note     Today's date: 2022  Patient name: Leah Blanco  : 2006  MRN: [id number]  Referring provider: Amador Anderson  Dx:   Encounter Diagnosis     ICD-10-CM    1  Scheuermann's kyphosis of lumbar spine  M42 06                   Subjective: felt looser after IE but one day later could not sleep due to thoracic pain      Objective: See treatment diary below      Assessment: Tolerated treatment well  Pt reports improved sxs from baseline following MT today  Verbal and tactile cues required from PT for safe execution of therapeutic exercises  Patient demonstrated fatigue post treatment and would benefit from continued PT      Plan: Progress treatment as tolerated         EPOC: 22      Manuals             PA dwayne thoracic spine NS                                                   Neuro Re-Ed             Mid row iso 3x10 Peach TB 3"            Shld ext iso 3x10 peach TB 3"            Scapular + cervical retraction iso 10x10" prone                                                                Ther Ex             Doorway pec stretch 10x10"            Thoracic rotation in chair 2x10 5"            Thoracic flexion in chair 10x5"            Thoracic extension in chair 10x5"            Open Book stretch 2x10 10" L & R                                                   Ther Activity             UE Ergo 4'/4'                          Gait Training                                       Modalities
The patient is a 67y Female complaining of shortness of breath.

## 2022-12-10 NOTE — ED ADULT NURSE NOTE - OBJECTIVE STATEMENT
staff from [facility] unit at bedside pt is sob upon talking pt on ra pt is refusing blood work, pt has daughter at bedside slight work or breathing noted.

## 2022-12-10 NOTE — ED PROVIDER NOTE - PHYSICAL EXAMINATION
CONST: Well appearing in NAD  EYES: Sclera and conjunctiva clear.   ENT: No nasal discharge.  Oropharynx normal appearing, no erythema or exudates.   CARD: Normal S1 S2; Normal rate and rhythm  RESP: Mild wheeze R upper lung fields. No distress.   GI: Soft, non-tender, non-distended.  SKIN: Warm, dry, no acute rashes.   NEURO: A&Ox2, No focal deficits.   Psych; Paranoid, anxious.

## 2022-12-10 NOTE — H&P ADULT - ATTENDING COMMENTS
Patient seen and examined at bedside independently of the residents. I read the resident's note and agree with the plan with the additions and corrections as noted below.    REVIEW OF SYSTEMS:  CONSTITUTIONAL: No weakness, fevers or chills  EYES/ENT: No visual changes;  No vertigo or throat pain   NECK: No pain or stiffness  RESPIRATORY: No cough, wheezing, hemoptysis. SOB.   CARDIOVASCULAR: No chest pain or palpitations  GASTROINTESTINAL: No abdominal or epigastric pain. No nausea, vomiting, or hematemesis; No diarrhea or constipation. No melena or hematochezia.  GENITOURINARY: No dysuria, frequency or hematuria  NEUROLOGICAL: No numbness or weakness  MSK: No pain. No weakness.   SKIN: No itching, rashes.     PMH: COPD, GERD, Schizophrenia and recent COVID-19 infection    FHx: Reviewed. No fhx of asthma/copd, No fhx of liver and pulmonary disease. No fhx of hematological disorder.     Physical Exam:  GEN: No acute distress. Awake, Alert and oriented x 3.   Head: Atraumatic, Normocephalic.   Eye: PEERLA. No sclera icterus. EOMI.   ENT: Normal oropharynx, no thyromegaly, no mass, no lymphadenopathy.   LUNGS: Clear to auscultation bilaterally. No wheeze/rales/crackles.   HEART: Normal. S1/S2 present. RRR. No murmur/gallops.   ABD: Soft, non-tender, non-distended. Bowel sounds present.   EXT: No pitting edema. No erythema. No tenderness.  Integumentary: No rash, No sore, No petechia.   NEURO: CN III-XII intact. Strength: 5/5 b/l ULE. Sensory intact b/l ULE.     Vital Signs Last 24 Hrs  T(C): 36.6 (10 Dec 2022 16:50), Max: 36.6 (10 Dec 2022 16:50)  T(F): 97.8 (10 Dec 2022 16:50), Max: 97.8 (10 Dec 2022 16:50)  HR: 104 (10 Dec 2022 19:10) (104 - 119)  BP: 139/91 (10 Dec 2022 16:50) (139/91 - 139/91)  BP(mean): --  RR: 20 (10 Dec 2022 16:50) (20 - 20)  SpO2: 92% (10 Dec 2022 19:03) (92% - 95%)    Parameters below as of 10 Dec 2022 19:03  Patient On (Oxygen Delivery Method): room air    I&O's Summary    10 Dec 2022 07:01  -  11 Dec 2022 02:35  --------------------------------------------------------  IN: 0 mL / OUT: 201 mL / NET: -201 mL      Please see the above notes for Labs and radiology.     Assessment and Plan:     66 yo F with hx of COPD, GERD, Schizophrenia and recent COVID-19 infection presents from [facility] to ED for 2 days hx of SOB, found to be hypoxic.     Sepsis and AHRF likely 2/2 PNA   - CTA negative for acute PE, however it shows  - s/p levequin x 1 in ED.   - c/w vanco and levaquin for now (pt was recently at [facility]).   - Check MRSA nares, If neg --> may dc vanco.   - f/u BCx, atypical PNA panel.   - check COVID-19 PCR/RVP.   - ID consult.     COPD - patient is not wheezing. c/w inhalers prn.   Schizophrenia - on olanzapine, haldol, aripiprazole.   GERD - pepcid    DVT ppx: Lovenox SC  GI ppx: Pepcid  Diet: DASH    Activity: as tolerated.     Date seen by the attendin/10/2022.  Total time spent: 75 minutes. Patient seen and examined at bedside independently of the residents. I read the resident's note and agree with the plan with the additions and corrections as noted below.    REVIEW OF SYSTEMS:  CONSTITUTIONAL: No weakness, fevers or chills  EYES/ENT: No visual changes;  No vertigo or throat pain   NECK: No pain or stiffness  RESPIRATORY: No cough, wheezing, hemoptysis. SOB.   CARDIOVASCULAR: No chest pain or palpitations  GASTROINTESTINAL: No abdominal or epigastric pain. No nausea, vomiting, or hematemesis; No diarrhea or constipation. No melena or hematochezia.  GENITOURINARY: No dysuria, frequency or hematuria  NEUROLOGICAL: No numbness or weakness  MSK: No pain. No weakness.   SKIN: No itching, rashes.     PMH: COPD, GERD, Schizophrenia and recent COVID-19 infection    FHx: Reviewed. No fhx of asthma/copd, No fhx of liver and pulmonary disease. No fhx of hematological disorder.     Physical Exam:  GEN: No acute distress. Awake, Alert and oriented x 2.   Head: Atraumatic, Normocephalic.   Eye: PEERLA. No sclera icterus. EOMI.   ENT: Normal oropharynx, no thyromegaly, no mass, no lymphadenopathy.   LUNGS: Clear to auscultation bilaterally. No wheeze/rales/crackles.   HEART: Normal. S1/S2 present. RRR. No murmur/gallops.   ABD: Soft, non-tender, non-distended. Bowel sounds present.   EXT: No pitting edema. No erythema. No tenderness.  Integumentary: No rash, No sore, No petechia.   NEURO: CN III-XII intact. Strength: 5/5 b/l ULE. Sensory intact b/l ULE.     Vital Signs Last 24 Hrs  T(C): 36.6 (10 Dec 2022 16:50), Max: 36.6 (10 Dec 2022 16:50)  T(F): 97.8 (10 Dec 2022 16:50), Max: 97.8 (10 Dec 2022 16:50)  HR: 104 (10 Dec 2022 19:10) (104 - 119)  BP: 139/91 (10 Dec 2022 16:50) (139/91 - 139/91)  BP(mean): --  RR: 20 (10 Dec 2022 16:50) (20 - 20)  SpO2: 92% (10 Dec 2022 19:03) (92% - 95%)    Parameters below as of 10 Dec 2022 19:03  Patient On (Oxygen Delivery Method): room air    I&O's Summary    10 Dec 2022 07:01  -  11 Dec 2022 02:35  --------------------------------------------------------  IN: 0 mL / OUT: 201 mL / NET: -201 mL      Please see the above notes for Labs and radiology.     Assessment and Plan:     66 yo F with hx of COPD, GERD, Schizophrenia and recent COVID-19 infection presents from [facility] to ED for 2 days hx of SOB, found to be hypoxic.     Sepsis and AHRF likely 2/2 PNA   - CTA negative for acute PE, however it shows  - s/p levequin x 1 in ED.   - c/w vanco and levaquin for now (pt was recently at [facility]).   - Check MRSA nares, If neg --> may dc vanco.   - f/u BCx, atypical PNA panel.   - check COVID-19 PCR/RVP.   - ID consult.     COPD - patient is not wheezing. c/w inhalers prn.   Schizophrenia - on olanzapine, haldol, aripiprazole.   GERD - pepcid    DVT ppx: Lovenox SC  GI ppx: Pepcid  Diet: DASH    Activity: as tolerated.     Date seen by the attendin/10/2022.  Total time spent: 75 minutes.

## 2022-12-10 NOTE — ED ADULT NURSE NOTE - CHIEF COMPLAINT QUOTE
Sent in from [place] for Shortness of breath . Patient is 91% on room air with increased work of breathing . Denied any HI/SI ideation. Alert,oriented x 3

## 2022-12-10 NOTE — ED PROVIDER NOTE - CLINICAL SUMMARY MEDICAL DECISION MAKING FREE TEXT BOX
crystal: signed out to Dr. Garcia. hypoxemic resp failure. ED w/up including ekg, labs, CTPE notable for PNA. given IV abx. no resp distress and normal WOB. sats mid 90s on 4LNC. admitted to medicine

## 2022-12-10 NOTE — ED PROVIDER NOTE - OBJECTIVE STATEMENT
66 y/o female w/ a PMHx presents from [place] to the ED complaining of shortness of breath x 2day. Hx provided by aide at [facility]. Patient today was found to be hypoxic at 4 pm 72% on RA. Patient refused to take her PRN albuterol treatments. Was started on 2L nasal cannula and sent to ED for evaluation. Of note patient was recently discharged from [facility] after testing positive for COVID on course of oral steroids which patient refused to take.

## 2022-12-10 NOTE — ED PROVIDER NOTE - ATTENDING APP SHARED VISIT CONTRIBUTION OF CARE
68 yo f with pmh of schizophrenia, gerd, copd no home O2, sent from [facility] for sob x 2 days.  pt was found hypoxic on RA.  pt refused her neb tx at the facility so sent to ED.  pt was recently discharged from [facility] for copd exacerbation and covid, but has been noncompliant with her nebs and steroids.  no fever ,no chills.  no cp.  pt denies sob.  no leg pain or swelling.  exam: nad, ncat, perrl, eomi, mmm, rrr, ctab, abd soft, nt, nd aox3, no calf tenderness, no pitting edema imp: pt with sob x 2 days, refusing her nebs and meds at [facility], currently also refusing labs, will try nebs and oral steroids

## 2022-12-10 NOTE — H&P ADULT - NSHPPHYSICALEXAM_GEN_ALL_CORE
GENERAL: NAD, lying in bed comfortably  HEAD:  Atraumatic, Normocephalic  EYES: EOMI, PERRLA, conjunctiva and sclera clear  ENT: Moist mucous membranes  NECK: Supple, No JVD  CHEST/LUNG: Clear to auscultation bilaterally; No rales, rhonchi, wheezing, or rubs. Unlabored respirations  HEART: Regular rate and rhythm; No murmurs, rubs, or gallops  ABDOMEN: Bowel sounds present; Soft, Nontender, Nondistended. No hepatomegally  EXTREMITIES:  2+ Peripheral Pulses, brisk capillary refill. No clubbing, cyanosis, or edema  NERVOUS SYSTEM:  Alert & Oriented X3, speech clear. No deficits   MSK: FROM all 4 extremities, full and equal strength  SKIN: No rashes or lesions GENERAL: NAD, lying in bed recently sedated , arousable to touch   HEAD:  Atraumatic, Normocephalic  EYES: EOMI,  conjunctiva and sclera clear  ENT: Moist mucous membranes  NECK: Supple,  CHEST/LUNG: Clear to auscultation bilaterally;  Unlabored respirations on 3L NC  HEART: Regular rate and rhythm  ABDOMEN:  Soft, Nontender, Nondistended.   EXTREMITIES:  2+ Peripheral Pulses, brisk capillary refill.   NERVOUS SYSTEM:  Sedated  SKIN: No rashes or lesions

## 2022-12-10 NOTE — H&P ADULT - NSHPLABSRESULTS_GEN_ALL_CORE
(12-10 @ 20:37)                      12.3  15.83 )-----------( 204                 38.2    Neutrophils = 12.90 (81.5%)  Lymphocytes = 1.79 (11.3%)  Eosinophils = 0.09 (0.6%)  Basophils = 0.03 (0.2%)  Monocytes = 0.95 (6.0%)  Bands = --%    12-10    142  |  104  |  17  ----------------------------<  125<H>  4.4   |  28  |  0.7    Ca    9.2      10 Dec 2022 20:37    TPro  6.1  /  Alb  3.9  /  TBili  0.3  /  DBili  x   /  AST  12  /  ALT  14  /  AlkPhos  60  12-10    ( 10 Dec 2022 20:37 )   PT: 11.40 sec;   INR: 1.00 ratio;       PTT:31.7 sec  CARDIAC MARKERS ( 10 Dec 2022 20:37 )  Trop <0.01 ng/mL / CK x     / CKMB x           Venous Blood Gas:  12-10 @ 21:59  7.31/58/47/29/74.6  VBG Lactate: 1.30

## 2022-12-10 NOTE — H&P ADULT - HISTORY OF PRESENT ILLNESS
68 y/o female     PMHx     presents from [facility] to the ED complaining of shortness of breath x 2day.     Hx provided by aide at [facility]. Patient today was found to be hypoxic at 4 pm 72% on RA. Patient refused to take her PRN albuterol treatments. Was started on 2L nasal cannula and sent to ED for evaluation.     Of note patient was recently discharged from [facility] after testing positive for COVID on course of oral steroids which patient refused to take.    In the ED :  BP: 139/91  HR : 119  RR : 20  T : 97.8  O2: 95% on 3L NC     WBC : 15.8  VBG : ph : 7.31, CO2: 58, HCO3:29,  lac: 1.3     CT angio chest : No PE  Patchy consolidation within the inferior aspect of the left upper, left lower and right middle lobes.   Extensive emphysematous changes.   68 y/o female     PMHx  COPD, GERD, and schizophrenia     presents from [facility] to the ED complaining of shortness of breath x 2day.     At the time of interview patient was sedated to go for CT chest and unable to provide Hx. Hx provided by aide at [facility]. Patient today was found to be hypoxic at 4 pm 72% on RA. Patient refused to take her PRN albuterol treatments. Was started on 2L nasal cannula and sent to ED for evaluation.     Of note patient was recently discharged from [facility] after testing positive for COVID on course of oral steroids which patient refused to take.    In the ED :  BP: 139/91  HR : 119  RR : 20  T : 97.8  O2: 95% on 3L NC     WBC : 15.8  VBG : ph : 7.31, CO2: 58, HCO3:29,  lac: 1.3     CT angio chest : No PE  Patchy consolidation within the inferior aspect of the left upper, left lower and right middle lobes.   Extensive emphysematous changes.   66 y/o female   PMHx  COPD, GERD, and schizophrenia   Presents from [facility] to the ED complaining of shortness of breath x 2days.     At the time of interview patient was sedated to go for CT chest and unable to provide Hx. Hx provided by aide at [facility]. Patient today was found to be hypoxic at 4 pm 72% on RA. Patient refused to take her PRN albuterol treatments. Was started on 2L nasal cannula and sent to ED for evaluation.     Of note patient was recently discharged from [facility] after testing positive for COVID on course of oral steroids which patient refused to take.    In the ED :  BP: 139/91  HR : 119  RR : 20  T : 97.8  O2: 95% on 3L NC     WBC : 15.8  VBG : ph : 7.31, CO2: 58, HCO3:29,  lac: 1.3     CT angio chest : No PE  Patchy consolidation within the inferior aspect of the left upper, left lower and right middle lobes.   Extensive emphysematous changes.

## 2022-12-10 NOTE — ED ADULT TRIAGE NOTE - CHIEF COMPLAINT QUOTE
Sent in from [place] for Shortness of breath . Patient is 91% on room air with increased work of breathing . Denied any HI/SI ideation. Alert,oriented x 3

## 2022-12-10 NOTE — ED PROVIDER NOTE - NS ED ROS FT
Review of Systems:  	•	CONSTITUTIONAL - no fever, no diaphoresis, no chills  	•	SKIN - no rash  	•	HEMATOLOGIC - no bleeding, no bruising  	•	EYES - no eye pain, no blurry vision  	•	ENT - no congestion  	•	RESPIRATORY - + shortness of breath, no cough  	•	CARDIAC - no chest pain, no palpitations  	•	GI - no abd pain, no nausea, no vomiting, no diarrhea, no constipation  	•	GENITO-URINARY - no dysuria; no hematuria, no increased urinary frequency  	•	MUSCULOSKELETAL - no joint paint, no swelling, no redness  	•	NEUROLOGIC - no weakness, no headache, no paresthesias, no LOC  	•	PSYCH - no anxiety, no depression  	All other ROS are negative except as documented in HPI.

## 2022-12-10 NOTE — ED PROVIDER NOTE - NS ED ATTENDING STATEMENT MOD
This was a shared visit with the CHENTE. I reviewed and verified the documentation and independently performed the documented:

## 2022-12-10 NOTE — H&P ADULT - ASSESSMENT
66 yo female with a PMHx of COPD, GERD, and schizophrenia presents from [facility] for SOB.     # Acute hypoxic resp failure   # Hx of recent COVID - steroid non complaint   # Hx COPD   - patient saturating X on X   - s/p Duoneb and prednisone 60 Po x1 in ED   - started ion Levaquin in ED     - f/u Bcx , UA, urine, strep legionella , MRSA nares , procal           #Schizophrenia  - from psychiatric institution   - hx of uncooperative behaviour wiht blood work and medication. cooperative for now, capacity in question   - given current psychiatric medication regimen olanzapine + bid risperidone + benztropine+ hydroxyzine   - FU psych OP      #GERD  - c/w famotidine     # DVT : Lovenox   # Diet : Regular   # Activity : [place]   # Dispo : form psych home   # Code : full 68 yo female with a PMHx of COPD, GERD, and schizophrenia presents from [facility] for hypoxia.     # Acute hypoxic resp failure   # Hx of recent COVID - steroid non complaint   # Hx COPD   - patient saturating 97% on 3L   - s/p Duoneb and prednisone 60 Po x1 in ED   - started ion Levaquin in ED (Penicillin allergy in chart unknown severity)  - f/u Bcx , UA, urine, strep legionella , MRSA nares , procal           #Schizophrenia  - from psychiatric institution   - hx of uncooperative behaviour wiht blood work and medication. cooperative for now, capacity in question   - given current psychiatric medication regimen olanzapine + bid risperidone + benztropine+ hydroxyzine   - FU psych OP      #GERD  - c/w famotidine     # DVT : Lovenox   # Diet : Regular   # Activity : [place]   # Dispo : form psych home   # Code : full 68 yo female with a PMHx of COPD, GERD, and schizophrenia presents from [facility] for hypoxia.     # Acute hypoxic resp failure   # Hx of recent COVID - steroid non complaint   # Hx COPD   - patient saturating 97% on 3L   - s/p Duoneb and prednisone 60 Po x1 in ED   - started ion Levaquin in ED (Penicillin allergy in chart unknown severity) - continue   - no previous culture on record   - f/u Bcx , UA, urine, strep legionella , MRSA nares , procal , RVP  - c/w albuterol., symbicort, spireva     #Schizophrenia  - from psychiatric institution   - hx of uncooperative behaviour whit blood work and medication. Needed sedation for CT chest.  capacity in question   - c/w olanzapine + haldol + hydroxyzine + aripiprazole   - F/u psych OP      #GERD  - c/w home famotidine     # DVT : Lovenox   # Diet : Regular - place once assigned to medicine   # Activity : [place]   # Dispo : from psych home   # Code : full

## 2022-12-11 LAB
FLUAV AG NPH QL: SIGNIFICANT CHANGE UP
FLUBV AG NPH QL: SIGNIFICANT CHANGE UP
RSV RNA NPH QL NAA+NON-PROBE: SIGNIFICANT CHANGE UP
SARS-COV-2 RNA SPEC QL NAA+PROBE: DETECTED

## 2022-12-11 PROCEDURE — 99233 SBSQ HOSP IP/OBS HIGH 50: CPT

## 2022-12-11 RX ORDER — VANCOMYCIN HCL 1 G
VIAL (EA) INTRAVENOUS
Refills: 0 | Status: DISCONTINUED | OUTPATIENT
Start: 2022-12-11 | End: 2022-12-11

## 2022-12-11 RX ORDER — VANCOMYCIN HCL 1 G
1000 VIAL (EA) INTRAVENOUS EVERY 12 HOURS
Refills: 0 | Status: DISCONTINUED | OUTPATIENT
Start: 2022-12-11 | End: 2022-12-11

## 2022-12-11 RX ORDER — VANCOMYCIN HCL 1 G
1000 VIAL (EA) INTRAVENOUS ONCE
Refills: 0 | Status: COMPLETED | OUTPATIENT
Start: 2022-12-11 | End: 2022-12-11

## 2022-12-11 RX ADMIN — SIMVASTATIN 20 MILLIGRAM(S): 20 TABLET, FILM COATED ORAL at 23:50

## 2022-12-11 RX ADMIN — FAMOTIDINE 20 MILLIGRAM(S): 10 INJECTION INTRAVENOUS at 05:32

## 2022-12-11 RX ADMIN — BUDESONIDE AND FORMOTEROL FUMARATE DIHYDRATE 2 PUFF(S): 160; 4.5 AEROSOL RESPIRATORY (INHALATION) at 08:21

## 2022-12-11 RX ADMIN — ARIPIPRAZOLE 20 MILLIGRAM(S): 15 TABLET ORAL at 11:09

## 2022-12-11 RX ADMIN — Medication 50 MILLIGRAM(S): at 05:32

## 2022-12-11 RX ADMIN — FAMOTIDINE 20 MILLIGRAM(S): 10 INJECTION INTRAVENOUS at 17:28

## 2022-12-11 RX ADMIN — ALBUTEROL 2 PUFF(S): 90 AEROSOL, METERED ORAL at 15:57

## 2022-12-11 RX ADMIN — HALOPERIDOL DECANOATE 5 MILLIGRAM(S): 100 INJECTION INTRAMUSCULAR at 11:09

## 2022-12-11 RX ADMIN — Medication 50 MILLIGRAM(S): at 23:49

## 2022-12-11 RX ADMIN — OLANZAPINE 15 MILLIGRAM(S): 15 TABLET, FILM COATED ORAL at 11:09

## 2022-12-11 RX ADMIN — Medication 250 MILLIGRAM(S): at 05:31

## 2022-12-11 RX ADMIN — Medication 50 MILLIGRAM(S): at 14:25

## 2022-12-11 NOTE — CONSULT NOTE ADULT - SUBJECTIVE AND OBJECTIVE BOX
GAGANDEEP MORRIS  67y, Female  Allergy: eggs (Other (U))  fish (Other (U))  Navane (Other (U))  penicillins (Other (U))      All historical available data reviewed.    HPI:  66 y/o female   PMHx  COPD, GERD, and schizophrenia   Presents from [facility] to the ED complaining of shortness of breath x 2days.     At the time of interview patient was sedated to go for CT chest and unable to provide Hx. Hx provided by aide at [facility]. Patient today was found to be hypoxic at 4 pm 72% on RA. Patient refused to take her PRN albuterol treatments. Was started on 2L nasal cannula and sent to ED for evaluation.     Of note patient was recently discharged from [facility] after testing positive for COVID on course of oral steroids which patient refused to take.    In the ED :  BP: 139/91  HR : 119  RR : 20  T : 97.8  O2: 95% on 3L NC     WBC : 15.8  VBG : ph : 7.31, CO2: 58, HCO3:29,  lac: 1.3     CT angio chest : No PE  Patchy consolidation within the inferior aspect of the left upper, left lower and right middle lobes.   Extensive emphysematous changes.   (10 Dec 2022 22:15)    FAMILY HISTORY:    PAST MEDICAL & SURGICAL HISTORY:  Schizophrenia      Chronic obstructive lung disease      Chronic GERD            VITALS:  T(F): 97.8, Max: 97.8 (12-10-22 @ 16:50)  HR: 104  BP: 139/91  RR: 20Vital Signs Last 24 Hrs  T(C): 36.6 (10 Dec 2022 16:50), Max: 36.6 (10 Dec 2022 16:50)  T(F): 97.8 (10 Dec 2022 16:50), Max: 97.8 (10 Dec 2022 16:50)  HR: 104 (10 Dec 2022 19:10) (104 - 119)  BP: 139/91 (10 Dec 2022 16:50) (139/91 - 139/91)  BP(mean): --  RR: 20 (10 Dec 2022 16:50) (20 - 20)  SpO2: 92% (10 Dec 2022 19:03) (92% - 95%)    Parameters below as of 10 Dec 2022 19:03  Patient On (Oxygen Delivery Method): room air        TESTS & MEASUREMENTS:                        12.3   15.83 )-----------( 204      ( 10 Dec 2022 20:37 )             38.2     12-10    142  |  104  |  17  ----------------------------<  125<H>  4.4   |  28  |  0.7    Ca    9.2      10 Dec 2022 20:37    TPro  6.1  /  Alb  3.9  /  TBili  0.3  /  DBili  x   /  AST  12  /  ALT  14  /  AlkPhos  60  12-10    LIVER FUNCTIONS - ( 10 Dec 2022 20:37 )  Alb: 3.9 g/dL / Pro: 6.1 g/dL / ALK PHOS: 60 U/L / ALT: 14 U/L / AST: 12 U/L / GGT: x                   RADIOLOGY & ADDITIONAL TESTS:  Personal review of radiological diagnostics performed  Echo and EKG results noted when applicable.     MEDICATIONS:  acetaminophen     Tablet .. 650 milliGRAM(s) Oral every 6 hours PRN  albuterol    90 MICROgram(s) HFA Inhaler 2 Puff(s) Inhalation every 6 hours  aluminum hydroxide/magnesium hydroxide/simethicone Suspension 30 milliLiter(s) Oral every 4 hours PRN  ARIPiprazole 20 milliGRAM(s) Oral daily  budesonide 160 MICROgram(s)/formoterol 4.5 MICROgram(s) Inhaler 2 Puff(s) Inhalation two times a day  enoxaparin Injectable 40 milliGRAM(s) SubCutaneous every 24 hours  famotidine    Tablet 20 milliGRAM(s) Oral two times a day  haloperidol    Injectable 5 milliGRAM(s) IntraMuscular daily  hydrOXYzine hydrochloride 50 milliGRAM(s) Oral every 8 hours  levoFLOXacin IVPB 750 milliGRAM(s) IV Intermittent every 24 hours  melatonin 3 milliGRAM(s) Oral at bedtime PRN  OLANZapine 15 milliGRAM(s) Oral daily  OLANZapine Injectable 10 milliGRAM(s) IntraMuscular at bedtime  ondansetron Injectable 4 milliGRAM(s) IV Push every 8 hours PRN  simvastatin 20 milliGRAM(s) Oral at bedtime  tiotropium 2.5 MICROgram(s) Inhaler 2 Puff(s) Inhalation daily  vancomycin  IVPB      vancomycin  IVPB 1000 milliGRAM(s) IV Intermittent once  vancomycin  IVPB 1000 milliGRAM(s) IV Intermittent every 12 hours      ANTIBIOTICS:  levoFLOXacin IVPB 750 milliGRAM(s) IV Intermittent every 24 hours  vancomycin  IVPB      vancomycin  IVPB 1000 milliGRAM(s) IV Intermittent once  vancomycin  IVPB 1000 milliGRAM(s) IV Intermittent every 12 hours

## 2022-12-11 NOTE — CONSULT NOTE ADULT - ASSESSMENT
68 y/o female from [facility] complaining of shortness of breath x 2days.   Recently discharged from [facility] after testing positive for COVID on course of oral steroids which patient refused to take.  ED : O2: 95% on 3L NC   CT angio chest : No PE. Patchy consolidation within the inferior aspect of the left upper, left lower and right middle lobes. Extensive emphysematous changes.    IMPRESSION;  66 y/o female from [facility] complaining of shortness of breath x 2days.   Recently discharged from [facility] after testing positive for COVID on course of oral steroids which patient refused to take.  ED : O2: 95% on 3L NC   CT angio chest : No PE. Patchy consolidation within the inferior aspect of the left upper, left lower and right middle lobes. Extensive emphysematous changes.    IMPRESSION;   COVID with a positive test and asymptomatic  On RA  CXR no GGO   CT with faint opacities   Vaccination status unclear  Possible aspiration with chemical pneumonitis  Clinically no multilobar bacterial PNA and will not recommend treating as such.    RECOMMENDATIONS;    mg iv on Day 1, then 100 mg iv D2 and D3.   No steroids  D/c ABx  Please do not hesitate to recall ID if any questions arise either through [employer] or through [employer]

## 2022-12-12 PROCEDURE — 99232 SBSQ HOSP IP/OBS MODERATE 35: CPT

## 2022-12-12 RX ORDER — REMDESIVIR 5 MG/ML
200 INJECTION INTRAVENOUS EVERY 24 HOURS
Refills: 0 | Status: COMPLETED | OUTPATIENT
Start: 2022-12-12 | End: 2022-12-12

## 2022-12-12 RX ORDER — REMDESIVIR 5 MG/ML
100 INJECTION INTRAVENOUS EVERY 24 HOURS
Refills: 0 | Status: COMPLETED | OUTPATIENT
Start: 2022-12-13 | End: 2022-12-14

## 2022-12-12 RX ORDER — REMDESIVIR 5 MG/ML
INJECTION INTRAVENOUS
Refills: 0 | Status: COMPLETED | OUTPATIENT
Start: 2022-12-12 | End: 2022-12-14

## 2022-12-12 RX ADMIN — HALOPERIDOL DECANOATE 5 MILLIGRAM(S): 100 INJECTION INTRAMUSCULAR at 12:06

## 2022-12-12 RX ADMIN — Medication 50 MILLIGRAM(S): at 06:37

## 2022-12-12 RX ADMIN — ARIPIPRAZOLE 20 MILLIGRAM(S): 15 TABLET ORAL at 12:06

## 2022-12-12 RX ADMIN — ALBUTEROL 2 PUFF(S): 90 AEROSOL, METERED ORAL at 20:04

## 2022-12-12 RX ADMIN — OLANZAPINE 10 MILLIGRAM(S): 15 TABLET, FILM COATED ORAL at 22:46

## 2022-12-12 RX ADMIN — ALBUTEROL 2 PUFF(S): 90 AEROSOL, METERED ORAL at 14:53

## 2022-12-12 RX ADMIN — ENOXAPARIN SODIUM 40 MILLIGRAM(S): 100 INJECTION SUBCUTANEOUS at 12:00

## 2022-12-12 RX ADMIN — Medication 50 MILLIGRAM(S): at 22:45

## 2022-12-12 RX ADMIN — Medication 50 MILLIGRAM(S): at 14:42

## 2022-12-12 RX ADMIN — ALBUTEROL 2 PUFF(S): 90 AEROSOL, METERED ORAL at 08:29

## 2022-12-12 RX ADMIN — SIMVASTATIN 20 MILLIGRAM(S): 20 TABLET, FILM COATED ORAL at 22:46

## 2022-12-12 RX ADMIN — OLANZAPINE 15 MILLIGRAM(S): 15 TABLET, FILM COATED ORAL at 12:06

## 2022-12-12 RX ADMIN — BUDESONIDE AND FORMOTEROL FUMARATE DIHYDRATE 2 PUFF(S): 160; 4.5 AEROSOL RESPIRATORY (INHALATION) at 20:00

## 2022-12-12 RX ADMIN — BUDESONIDE AND FORMOTEROL FUMARATE DIHYDRATE 2 PUFF(S): 160; 4.5 AEROSOL RESPIRATORY (INHALATION) at 08:29

## 2022-12-12 RX ADMIN — FAMOTIDINE 20 MILLIGRAM(S): 10 INJECTION INTRAVENOUS at 06:37

## 2022-12-12 RX ADMIN — FAMOTIDINE 20 MILLIGRAM(S): 10 INJECTION INTRAVENOUS at 17:13

## 2022-12-12 RX ADMIN — REMDESIVIR 200 MILLIGRAM(S): 5 INJECTION INTRAVENOUS at 17:13

## 2022-12-12 NOTE — ED ADULT NURSE REASSESSMENT NOTE - NS ED NURSE REASSESS COMMENT FT1
Assumed care from previous day shift nurse c/o pneumonia , + COVID , with own sitter at the bedside , pt no respiratory distress noted , no accessory muscles used , no grimaced face noted , call light within reached , fall alarm on

## 2022-12-12 NOTE — PATIENT PROFILE ADULT - FALL HARM RISK - HARM RISK INTERVENTIONS

## 2022-12-12 NOTE — SWALLOW BEDSIDE ASSESSMENT ADULT - SLP PERTINENT HISTORY OF CURRENT PROBLEM
68 y/o female PMHx  COPD, GERD, and schizophrenia. Presents from [facility] to the ED complaining of shortness of breath x 2days. Found to have Transient hypoxia, COVID+, Possible aspiration pneumonitis. CT PE 12/10: Negative for PE, emphesymatous change. Patchy consolidations within left upper, left lower, and right middle lobes

## 2022-12-12 NOTE — PATIENT PROFILE ADULT - PUBLIC BENEFITS
Pt following up on referral for retina specialist. I informed pt that referral was faxed 8/23/19 (per encounter). Pt verbalized understanding.    no

## 2022-12-12 NOTE — PHYSICAL THERAPY INITIAL EVALUATION ADULT - PERTINENT HX OF CURRENT PROBLEM, REHAB EVAL
66 y/o female   PMHx  COPD, GERD, and schizophrenia   Presents from [facility] to the ED complaining of shortness of breath x 2days.     At the time of interview patient was sedated to go for CT chest and unable to provide Hx. Hx provided by aide at beside. Patient today was found to be hypoxic at 4 pm 72% on RA. Patient refused to take her PRN albuterol treatments. Was started on 2L nasal cannula and sent to ED for evaluation.     Of note patient was recently discharged from [facility] after testing positive for COVID on course of oral steroids which patient refused to take.

## 2022-12-13 PROCEDURE — 99233 SBSQ HOSP IP/OBS HIGH 50: CPT

## 2022-12-13 RX ADMIN — FAMOTIDINE 20 MILLIGRAM(S): 10 INJECTION INTRAVENOUS at 18:00

## 2022-12-13 RX ADMIN — Medication 50 MILLIGRAM(S): at 05:36

## 2022-12-13 RX ADMIN — Medication 50 MILLIGRAM(S): at 22:01

## 2022-12-13 RX ADMIN — Medication 50 MILLIGRAM(S): at 14:58

## 2022-12-13 RX ADMIN — ARIPIPRAZOLE 20 MILLIGRAM(S): 15 TABLET ORAL at 14:58

## 2022-12-13 RX ADMIN — SIMVASTATIN 20 MILLIGRAM(S): 20 TABLET, FILM COATED ORAL at 22:02

## 2022-12-13 RX ADMIN — OLANZAPINE 15 MILLIGRAM(S): 15 TABLET, FILM COATED ORAL at 14:58

## 2022-12-13 RX ADMIN — ALBUTEROL 2 PUFF(S): 90 AEROSOL, METERED ORAL at 20:12

## 2022-12-13 RX ADMIN — FAMOTIDINE 20 MILLIGRAM(S): 10 INJECTION INTRAVENOUS at 05:37

## 2022-12-13 NOTE — PROGRESS NOTE ADULT - ASSESSMENT
66 y/o female PMHx  COPD, GERD, and schizophrenia   Presents from [facility] facility to the ED complaining of shortness of breath x 2days.     #Transient hypoxia  #COVID+  #Possible aspiration pneumonitis  CT PE 12/10: Negative for PE, emphesymatous change. Patchy consolidations within left upper, left lower, and right middle lobes  ID eval appreciated, no clinical signs of PNA  Passed S/S eval  - Monitor off abx  - No steroids needed  - RDV x 3d today completing dose.   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 
66 y/o female PMHx  COPD, GERD, and schizophrenia   Presents from [facility] to the ED complaining of shortness of breath x 2days.     #Transient hypoxia  #COVID+  #Possible aspiration pneumonitis  CT PE 12/10: Negative for PE, emphesymatous change. Patchy consolidations within left upper, left lower, and right middle lobes  ID eval appreciated, no clinical signs of PNA  - Monitor off abx  - No steroids needed  - S/S eval for aspiration  - RDV x 3d  - monitor O2 saturations during admission    #COPD  Not in acute exacerbation  - Duonebs PRN  - Cont spiriva    #HLD  - Cont simvastatin    #Schizophrenia  - Cont psych meds, on haldol, olanzapine, aripriprazole    DVT PPX, Lovenox    #Progress Note Handoff  Pending (specify): RDV x3d, monitor O2 sat, s/s eval  Family discussion: d/w pt regarding eval for hypoxia  Disposition: [facility]
68 y/o female PMHx  COPD, GERD, and schizophrenia   Presents from [facility] to the ED complaining of shortness of breath x 2days.     #Transient hypoxia  #COVID+  #Possible aspiration pneumonitis  CT PE 12/10: Negative for PE, emphesymatous change. Patchy consolidations within left upper, left lower, and right middle lobes  ID eval appreciated, no clinical signs of PNA  Passed S/S eval  - Monitor off abx  - No steroids needed  - RDV x 3d  - monitor O2 saturations during admission  - DC planning 48h back to [place]    #COPD  Not in acute exacerbation  - Duonebs PRN  - Cont spiriva    #HLD  - Cont simvastatin    #Schizophrenia  - Cont psych meds, on haldol, olanzapine, aripriprazole    DVT PPX, Lovenox    #Progress Note Handoff  Pending (specify): RDV x3d, monitor O2 sat  Family discussion: d/w pt regarding eval for hypoxia  Disposition: [facility]

## 2022-12-13 NOTE — DISCHARGE NOTE PROVIDER - NSDCCPCAREPLAN_GEN_ALL_CORE_FT
PRINCIPAL DISCHARGE DIAGNOSIS  Diagnosis: Hypoxia  Assessment and Plan of Treatment: You were admitted to the hospital after having an epsiode of hypoxia. You were found to be COVID positive. During your hospital stay, you were treated for covid with remdesivir. Your clinical status improved and no longer required supplemental oxygen. Please follow up with your primary care doctor.  Call your local emergency number (911 in the [place]) if:   •You have trouble breathing or shortness of breath at rest.  •You have chest pain or pressure that lasts longer than 5 minutes.  •You become confused or hard to wake.  •Your lips or face are blue.  Return to the emergency department if:   •You have a fever of 104°F (40°C) or higher.  Call your doctor if:   •You have symptoms of COVID-19.  •You have questions or concerns about your condition or care.

## 2022-12-13 NOTE — DISCHARGE NOTE PROVIDER - CARE PROVIDER_API CALL
Laurence Salguero  Internal Medicine  [address] [place] [address]  Phone: [phone]  Fax: ()-  Follow Up Time: 2 weeks

## 2022-12-13 NOTE — DISCHARGE NOTE PROVIDER - NSDCMRMEDTOKEN_GEN_ALL_CORE_FT
ARIPiprazole 20 mg oral tablet: 1 tab(s) orally once a day  bisacodyl 5 mg oral delayed release tablet: 1 tab(s) orally once a day  budesonide-formoterol 160 mcg-4.5 mcg/inh inhalation aerosol: 2 puff(s) inhaled 2 times a day  cholecalciferol oral tablet: 50 microgram(s) orally  famotidine 20 mg oral tablet: 1 tab(s) orally 2 times a day  haloperidol 5 mg/mL injectable solution: 1 milliliter(s) injectable once a day  hydrOXYzine hydrochloride 50 mg oral tablet: 1 tab(s) orally every 8 hours  ipratropium-albuterol 0.5 mg-2.5 mg/3 mLinhalation solution: 3 milliliter(s) inhaled every 6 hours, As needed, Shortness of Breath Continue as prescribed until told otherwise by your provider  OLANZapine 10 mg intramuscular injection: 10 milligram(s) intramuscular once a day (at bedtime)  OLANZapine 15 mg oral tablet: 1 tab(s) orally once a day  simvastatin 20 mg oral tablet: 1 tab(s) orally once a day (at bedtime)

## 2022-12-13 NOTE — DISCHARGE NOTE PROVIDER - HOSPITAL COURSE
68 y/o female PMHx  COPD, GERD, and schizophrenia presents from [place] [facility] to the ED complaining of shortness of breath x 2days.     At the time of interview patient was sedated to go for CT chest and unable to provide Hx. Hx provided by aide at beside. Patient today was found to be hypoxic at 4 pm 72% on RA. Patient refused to take her PRN albuterol treatments. Was started on 2L nasal cannula and sent to ED for evaluation.     Of note patient was recently discharged from [facility] after testing positive for COVID on course of oral steroids which patient refused to take.    In the ED :  BP: 139/91  HR : 119  RR : 20  T : 97.8  O2: 95% on 3L NC     WBC : 15.8  VBG : ph : 7.31, CO2: 58, HCO3:29,  lac: 1.3     CT angio chest : No PE  Patchy consolidation within the inferior aspect of the left upper, left lower and right middle lobes.   Extensive emphysematous changes.    Pt admitted for further workup. The following problems were addressed:     #Transient hypoxia  #COVID+  #Possible aspiration pneumonitis  CT PE 12/10: Negative for PE, emphesymatous change. Patchy consolidations within left upper, left lower, and right middle lobes  ID eval appreciated, no clinical signs of PNA  Passed S/S eval  - Monitor off abx  - No steroids needed  - RDV x 3d today completing dose.   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     At the time of interview patient was sedated to go for CT chest and unable to provide Hx. Hx provided by aide at beside. Patient was found to be hypoxic at 4 pm 72% on RA. Patient refused to take her PRN albuterol treatments. Was started on 2L nasal cannula and sent to ED for evaluation.     Of note patient was recently discharged from [facility] after testing positive for COVID on course of oral steroids which patient refused to take.    In the ED :  BP: 139/91  HR : 119  RR : 20  T : 97.8  O2: 95% on 3L NC     WBC : 15.8  VBG : ph : 7.31, CO2: 58, HCO3:29,  lac: 1.3     CT angio chest : No PE  Patchy consolidation within the inferior aspect of the left upper, left lower and right middle lobes.   Extensive emphysematous changes.    Pt admitted for further workup. The following problems were addressed:     #Transient hypoxia  #COVID+  #Possible aspiration pneumonitis  CT PE 12/10: Negative for PE, emphesymatous change. Patchy consolidations within left upper, left lower, and right middle lobes  ID eval appreciated, no clinical signs of PNA  Passed S/S eval  - Monitor off abx  - No steroids needed  - RDV x 3d today completing dose.   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

## 2022-12-13 NOTE — PROGRESS NOTE ADULT - SUBJECTIVE AND OBJECTIVE BOX
GAGANDEEP MORRIS  67y, Female  Allergy: eggs (Other (U))  fish (Other (U))  Navane (Other (U))  penicillins (Other (U))    Hospital Day: 2d    Patient seen and examined. No acute events overnight    PMH/PSH:  PAST MEDICAL & SURGICAL HISTORY:  Schizophrenia      Chronic obstructive lung disease      Chronic GERD          VITALS:  T(F): 98.8 (12-12-22 @ 06:32), Max: 98.8 (12-12-22 @ 06:32)  HR: 100 (12-12-22 @ 06:32)  BP: 132/79 (12-12-22 @ 06:32) (110/51 - 132/79)  RR: 20 (12-12-22 @ 06:32)  SpO2: 92% (12-12-22 @ 06:32)    TESTS & MEASUREMENTS:  Weight (Kg): 70.3 (12-10-22 @ 16:50)  BMI (kg/m2): 26.6 (12-10)    12-10-22 @ 07:01  -  12-11-22 @ 07:00  --------------------------------------------------------  IN: 0 mL / OUT: 201 mL / NET: -201 mL                            12.3   15.83 )-----------( 204      ( 10 Dec 2022 20:37 )             38.2     PT/INR - ( 10 Dec 2022 20:37 )   PT: 11.40 sec;   INR: 1.00 ratio         PTT - ( 10 Dec 2022 20:37 )  PTT:31.7 sec  12-10    142  |  104  |  17  ----------------------------<  125<H>  4.4   |  28  |  0.7    Ca    9.2      10 Dec 2022 20:37    TPro  6.1  /  Alb  3.9  /  TBili  0.3  /  DBili  x   /  AST  12  /  ALT  14  /  AlkPhos  60  12-10    LIVER FUNCTIONS - ( 10 Dec 2022 20:37 )  Alb: 3.9 g/dL / Pro: 6.1 g/dL / ALK PHOS: 60 U/L / ALT: 14 U/L / AST: 12 U/L / GGT: x           CARDIAC MARKERS ( 10 Dec 2022 20:37 )  x     / <0.01 ng/mL / x     / x     / x            Culture - Blood (collected 12-10-22 @ 23:02)  Source: .Blood Blood-Peripheral  Preliminary Report (12-12-22 @ 09:01):    No growth to date.    Culture - Blood (collected 12-10-22 @ 23:02)  Source: .Blood Blood-Peripheral  Preliminary Report (12-12-22 @ 09:01):    No growth to date.          RADIOLOGY & ADDITIONAL TESTS:    RECENT DIAGNOSTIC ORDERS:      MEDICATIONS:  MEDICATIONS  (STANDING):  albuterol    90 MICROgram(s) HFA Inhaler 2 Puff(s) Inhalation every 6 hours  ARIPiprazole 20 milliGRAM(s) Oral daily  budesonide 160 MICROgram(s)/formoterol 4.5 MICROgram(s) Inhaler 2 Puff(s) Inhalation two times a day  enoxaparin Injectable 40 milliGRAM(s) SubCutaneous every 24 hours  famotidine    Tablet 20 milliGRAM(s) Oral two times a day  haloperidol    Injectable 5 milliGRAM(s) IntraMuscular daily  hydrOXYzine hydrochloride 50 milliGRAM(s) Oral every 8 hours  OLANZapine 15 milliGRAM(s) Oral daily  OLANZapine Injectable 10 milliGRAM(s) IntraMuscular at bedtime  remdesivir  IVPB   IV Intermittent   simvastatin 20 milliGRAM(s) Oral at bedtime  tiotropium 2.5 MICROgram(s) Inhaler 2 Puff(s) Inhalation daily    MEDICATIONS  (PRN):  acetaminophen     Tablet .. 650 milliGRAM(s) Oral every 6 hours PRN Temp greater or equal to 38C (100.4F), Mild Pain (1 - 3)  aluminum hydroxide/magnesium hydroxide/simethicone Suspension 30 milliLiter(s) Oral every 4 hours PRN Dyspepsia  melatonin 3 milliGRAM(s) Oral at bedtime PRN Insomnia  ondansetron Injectable 4 milliGRAM(s) IV Push every 8 hours PRN Nausea and/or Vomiting      HOME MEDICATIONS:  ARIPiprazole 20 mg oral tablet (12-10)  bisacodyl 5 mg oral delayed release tablet (09-17)  budesonide-formoterol 160 mcg-4.5 mcg/inh inhalation aerosol (09-17)  cholecalciferol oral tablet (09-17)  famotidine 20 mg oral tablet (09-17)  haloperidol 5 mg/mL injectable solution (12-10)  hydrOXYzine hydrochloride 50 mg oral tablet (09-17)  ipratropium-albuterol 0.5 mg-2.5 mg/3 mLinhalation solution (09-17)  OLANZapine 10 mg intramuscular injection (12-10)  OLANZapine 15 mg oral tablet (09-17)  simvastatin 20 mg oral tablet (09-17)      REVIEW OF SYSTEMS:  All other review of systems is negative unless indicated above.     PHYSICAL EXAM:  PHYSICAL EXAM:  GENERAL: NAD, well-developed  HEAD:  Atraumatic, Normocephalic  NECK: Supple, No JVD  CHEST/LUNG: Clear to auscultation bilaterally; No wheeze  HEART: Regular rate and rhythm; No murmurs, rubs, or gallops  ABDOMEN: Soft, Nontender, Nondistended; Bowel sounds present  EXTREMITIES:  2+ Peripheral Pulses, No clubbing, cyanosis, or edema  SKIN: No rashes or lesions      
  GAGANDEEP MORRIS  67y, Female  Allergy: eggs (Other (U))  fish (Other (U))  Navane (Other (U))  penicillins (Other (U))    Hospital Day: 1d    Patient seen and examined. No acute events overnight    PMH/PSH:  PAST MEDICAL & SURGICAL HISTORY:  Schizophrenia      Chronic obstructive lung disease      Chronic GERD    VITALS:  T(F): 96.7 (12-11-22 @ 08:07), Max: 97.8 (12-10-22 @ 16:50)  HR: 79 (12-11-22 @ 08:07)  BP: 108/51 (12-11-22 @ 08:07) (108/51 - 139/91)  RR: 18 (12-11-22 @ 08:07)  SpO2: 98% (12-11-22 @ 08:07)    TESTS & MEASUREMENTS:  Weight (Kg): 70.3 (12-10-22 @ 16:50)  BMI (kg/m2): 26.6 (12-10)    12-10-22 @ 07:01  -  12-11-22 @ 07:00  --------------------------------------------------------  IN: 0 mL / OUT: 201 mL / NET: -201 mL                        12.3   15.83 )-----------( 204      ( 10 Dec 2022 20:37 )             38.2     PT/INR - ( 10 Dec 2022 20:37 )   PT: 11.40 sec;   INR: 1.00 ratio         PTT - ( 10 Dec 2022 20:37 )  PTT:31.7 sec  12-10    142  |  104  |  17  ----------------------------<  125<H>  4.4   |  28  |  0.7    Ca    9.2      10 Dec 2022 20:37    TPro  6.1  /  Alb  3.9  /  TBili  0.3  /  DBili  x   /  AST  12  /  ALT  14  /  AlkPhos  60  12-10    LIVER FUNCTIONS - ( 10 Dec 2022 20:37 )  Alb: 3.9 g/dL / Pro: 6.1 g/dL / ALK PHOS: 60 U/L / ALT: 14 U/L / AST: 12 U/L / GGT: x           CARDIAC MARKERS ( 10 Dec 2022 20:37 )  x     / <0.01 ng/mL / x     / x     / x        RECENT DIAGNOSTIC ORDERS:  Respiratory Viral Panel with COVID-19 by MALGORZATA: STAT (12-10-22 @ 23:34)  MRSA/MSSA PCR: Routine (12-10-22 @ 23:26)  Legionella pneumophila Antigen, Urine: Routine (12-10-22 @ 23:26)  Streptococcus pneumoniae Ag, Ur: STAT (12-10-22 @ 23:26)  Urinalysis: STAT (12-10-22 @ 23:26)  Culture - Blood: STAT  Specimen Source: Blood-Peripheral  Addl Info: Peripheral Site 2: Draw set 2 from separate site or at least 5 minutes apart if same site (12-10-22 @ 21:44)  Culture - Blood: STAT  Specimen Source: Blood-Peripheral  Addl Info: Peripheral Site 1: Draw prior to antibiotic administration (2 sets for suspected bacteremi (12-10-22 @ 21:44)    MEDICATIONS:  MEDICATIONS  (STANDING):  albuterol    90 MICROgram(s) HFA Inhaler 2 Puff(s) Inhalation every 6 hours  ARIPiprazole 20 milliGRAM(s) Oral daily  budesonide 160 MICROgram(s)/formoterol 4.5 MICROgram(s) Inhaler 2 Puff(s) Inhalation two times a day  enoxaparin Injectable 40 milliGRAM(s) SubCutaneous every 24 hours  famotidine    Tablet 20 milliGRAM(s) Oral two times a day  haloperidol    Injectable 5 milliGRAM(s) IntraMuscular daily  hydrOXYzine hydrochloride 50 milliGRAM(s) Oral every 8 hours  OLANZapine 15 milliGRAM(s) Oral daily  OLANZapine Injectable 10 milliGRAM(s) IntraMuscular at bedtime  simvastatin 20 milliGRAM(s) Oral at bedtime  tiotropium 2.5 MICROgram(s) Inhaler 2 Puff(s) Inhalation daily    MEDICATIONS  (PRN):  acetaminophen     Tablet .. 650 milliGRAM(s) Oral every 6 hours PRN Temp greater or equal to 38C (100.4F), Mild Pain (1 - 3)  aluminum hydroxide/magnesium hydroxide/simethicone Suspension 30 milliLiter(s) Oral every 4 hours PRN Dyspepsia  melatonin 3 milliGRAM(s) Oral at bedtime PRN Insomnia  ondansetron Injectable 4 milliGRAM(s) IV Push every 8 hours PRN Nausea and/or Vomiting    HOME MEDICATIONS:  ARIPiprazole 20 mg oral tablet (12-10)  bisacodyl 5 mg oral delayed release tablet (09-17)  budesonide-formoterol 160 mcg-4.5 mcg/inh inhalation aerosol (09-17)  cholecalciferol oral tablet (09-17)  famotidine 20 mg oral tablet (09-17)  haloperidol 5 mg/mL injectable solution (12-10)  hydrOXYzine hydrochloride 50 mg oral tablet (09-17)  ipratropium-albuterol 0.5 mg-2.5 mg/3 mLinhalation solution (09-17)  OLANZapine 10 mg intramuscular injection (12-10)  OLANZapine 15 mg oral tablet (09-17)  simvastatin 20 mg oral tablet (09-17)    REVIEW OF SYSTEMS:  All other review of systems is negative unless indicated above.     PHYSICAL EXAM:  PHYSICAL EXAM:  GENERAL: NAD, well-developed  HEAD:  Atraumatic, Normocephalic  NECK: Supple, No JVD  CHEST/LUNG: Clear to auscultation bilaterally; No wheeze  HEART: Regular rate and rhythm; No murmurs, rubs, or gallops  ABDOMEN: Soft, Nontender, Nondistended; Bowel sounds present  EXTREMITIES:  2+ Peripheral Pulses, No clubbing, cyanosis, or edema  SKIN: No rashes or lesions      
  GAGANDEEP MORRIS  67y, Female  Allergy: eggs (Other (U))  fish (Other (U))  Navane (Other (U))  penicillins (Other (U))    Hospital Day: 3d    Patient seen and examined. No acute events overnight    PMH/PSH:    Schizophrenia    Chronic obstructive lung disease    Chronic GERD    VITALS:  T(C): 36.9 (13 Dec 2022 13:15), Max: 36.9 (13 Dec 2022 13:15)  T(F): 98.5 (13 Dec 2022 13:15), Max: 98.5 (13 Dec 2022 13:15)  HR: 85 (13 Dec 2022 13:15) (79 - 102)  BP: 108/55 (13 Dec 2022 13:15) (108/55 - 145/71)  RR: 18 (13 Dec 2022 13:15) (18 - 18)  SpO2: 93% (13 Dec 2022 05:10) (93% - 96%)    Parameters below as of 12 Dec 2022 19:15  Patient On (Oxygen Delivery Method): room air      TESTS & MEASUREMENTS:  Weight (Kg): 70.3 (12-10-22 @ 16:50)  BMI (kg/m2): 26.6 (12-10)    12-10-22 @ 07:01  -  12-11-22 @ 07:00  --------------------------------------------------------  IN: 0 mL / OUT: 201 mL / NET: -201 mL                          12.3   15.83 )-----------( 204      ( 10 Dec 2022 20:37 )             38.2     PT/INR - ( 10 Dec 2022 20:37 )   PT: 11.40 sec;   INR: 1.00 ratio         PTT - ( 10 Dec 2022 20:37 )  PTT:31.7 sec  12-10    142  |  104  |  17  ----------------------------<  125<H>  4.4   |  28  |  0.7    Ca    9.2      10 Dec 2022 20:37    TPro  6.1  /  Alb  3.9  /  TBili  0.3  /  DBili  x   /  AST  12  /  ALT  14  /  AlkPhos  60  12-10    LIVER FUNCTIONS - ( 10 Dec 2022 20:37 )  Alb: 3.9 g/dL / Pro: 6.1 g/dL / ALK PHOS: 60 U/L / ALT: 14 U/L / AST: 12 U/L / GGT: x           CARDIAC MARKERS ( 10 Dec 2022 20:37 )  x     / <0.01 ng/mL / x     / x     / x          Culture - Blood (collected 12-10-22 @ 23:02)  Source: .Blood Blood-Peripheral  Preliminary Report (12-12-22 @ 09:01):    No growth to date.    Culture - Blood (collected 12-10-22 @ 23:02)  Source: .Blood Blood-Peripheral  Preliminary Report (12-12-22 @ 09:01):  No growth to date.    RADIOLOGY & ADDITIONAL TESTS: reviewed by me     MEDICATIONS:    MEDICATIONS  (STANDING):  albuterol    90 MICROgram(s) HFA Inhaler 2 Puff(s) Inhalation every 6 hours  ARIPiprazole 20 milliGRAM(s) Oral daily  budesonide 160 MICROgram(s)/formoterol 4.5 MICROgram(s) Inhaler 2 Puff(s) Inhalation two times a day  enoxaparin Injectable 40 milliGRAM(s) SubCutaneous every 24 hours  famotidine    Tablet 20 milliGRAM(s) Oral two times a day  haloperidol    Injectable 5 milliGRAM(s) IntraMuscular daily  hydrOXYzine hydrochloride 50 milliGRAM(s) Oral every 8 hours  OLANZapine 15 milliGRAM(s) Oral daily  OLANZapine Injectable 10 milliGRAM(s) IntraMuscular at bedtime  remdesivir  IVPB 100 milliGRAM(s) IV Intermittent every 24 hours  simvastatin 20 milliGRAM(s) Oral at bedtime  tiotropium 2.5 MICROgram(s) Inhaler 2 Puff(s) Inhalation daily    MEDICATIONS  (PRN):  acetaminophen     Tablet .. 650 milliGRAM(s) Oral every 6 hours PRN Temp greater or equal to 38C (100.4F), Mild Pain (1 - 3)  aluminum hydroxide/magnesium hydroxide/simethicone Suspension 30 milliLiter(s) Oral every 4 hours PRN Dyspepsia  melatonin 3 milliGRAM(s) Oral at bedtime PRN Insomnia  ondansetron Injectable 4 milliGRAM(s) IV Push every 8 hours PRN Nausea and/or Vomiting    HOME MEDICATIONS:  ARIPiprazole 20 mg oral tablet (12-10)  bisacodyl 5 mg oral delayed release tablet (09-17)  budesonide-formoterol 160 mcg-4.5 mcg/inh inhalation aerosol (09-17)  cholecalciferol oral tablet (09-17)  famotidine 20 mg oral tablet (09-17)  haloperidol 5 mg/mL injectable solution (12-10)  hydrOXYzine hydrochloride 50 mg oral tablet (09-17)  ipratropium-albuterol 0.5 mg-2.5 mg/3 mLinhalation solution (09-17)  OLANZapine 10 mg intramuscular injection (12-10)  OLANZapine 15 mg oral tablet (09-17)  simvastatin 20 mg oral tablet (09-17)      REVIEW OF SYSTEMS:  All other review of systems is negative unless indicated above.     PHYSICAL EXAM:  PHYSICAL EXAM:  GENERAL: NAD, well-developed  HEAD:  Atraumatic, Normocephalic  NECK: Supple, No JVD  CHEST/LUNG: Clear to auscultation bilaterally; No wheeze  HEART: Regular rate and rhythm; No murmurs, rubs, or gallops  ABDOMEN: Soft, Nontender, Nondistended; Bowel sounds present  EXTREMITIES:  2+ Peripheral Pulses, No clubbing, cyanosis, or edema  SKIN: No rashes or lesions

## 2022-12-13 NOTE — DISCHARGE NOTE PROVIDER - ATTENDING DISCHARGE PHYSICAL EXAMINATION:
Vital Signs Last 24 Hrs  T(F): 97.2 (14 Dec 2022 05:16), Max: 98.9 (13 Dec 2022 20:05)  HR: 69 (14 Dec 2022 05:16) (69 - 85)  BP: 128/73 (14 Dec 2022 05:16) (108/55 - 128/73)  RR: 18 (14 Dec 2022 05:16) (16 - 18)  SpO2: 98% (14 Dec 2022 05:16) (98% - 98%)    PHYSICAL EXAM:  GENERAL: NAD, well-groomed, well-developed  HEAD:  Atraumatic, Normocephalic  EYES: EOMI, conjunctiva and sclera clear  ENMT: Moist mucous membranes, ok dentition, no thrush  NECK: Supple, No JVD  CHEST/LUNG: Clear to auscultation bilaterally, good air entry, non-labored breathing  HEART: RRR; S1/S2, No murmur  ABDOMEN: Soft, Nontender, Nondistended; Bowel sounds present  VASCULAR: Normal pulses, Normal capillary refill  EXTREMITIES: No calf tenderness, No cyanosis, No edema  LYMPH: Normal; No lymphadenopathy noted  SKIN: Warm, Intact  PSYCH: Normal mood, Normal affect  NERVOUS SYSTEM:  A/O x3, Good concentration; CN 2-12 intact, No focal deficits

## 2022-12-14 VITALS
OXYGEN SATURATION: 95 % | DIASTOLIC BLOOD PRESSURE: 65 MMHG | TEMPERATURE: 96 F | HEART RATE: 82 BPM | RESPIRATION RATE: 18 BRPM | SYSTOLIC BLOOD PRESSURE: 137 MMHG

## 2022-12-14 PROCEDURE — 99232 SBSQ HOSP IP/OBS MODERATE 35: CPT

## 2022-12-14 RX ADMIN — Medication 50 MILLIGRAM(S): at 13:22

## 2022-12-14 RX ADMIN — ARIPIPRAZOLE 20 MILLIGRAM(S): 15 TABLET ORAL at 12:00

## 2022-12-14 RX ADMIN — BUDESONIDE AND FORMOTEROL FUMARATE DIHYDRATE 2 PUFF(S): 160; 4.5 AEROSOL RESPIRATORY (INHALATION) at 08:58

## 2022-12-14 RX ADMIN — FAMOTIDINE 20 MILLIGRAM(S): 10 INJECTION INTRAVENOUS at 05:26

## 2022-12-14 RX ADMIN — ALBUTEROL 2 PUFF(S): 90 AEROSOL, METERED ORAL at 14:11

## 2022-12-14 RX ADMIN — ALBUTEROL 2 PUFF(S): 90 AEROSOL, METERED ORAL at 08:58

## 2022-12-14 RX ADMIN — Medication 50 MILLIGRAM(S): at 05:26

## 2022-12-14 RX ADMIN — OLANZAPINE 15 MILLIGRAM(S): 15 TABLET, FILM COATED ORAL at 12:00

## 2022-12-14 NOTE — CHART NOTE - NSCHARTNOTEFT_GEN_A_CORE
<<<RESIDENT DISCHARGE NOTE>>>     GAGANDEEP MORRIS  MRN-[id number]    Spoke to Dr. Drew at [facility]; ok to take patient back.    No overnight events.    VITAL SIGNS:  T(F): 97.2 (12-14-22 @ 05:16), Max: 98.9 (12-13-22 @ 20:05)  HR: 69 (12-14-22 @ 05:16)  BP: 128/73 (12-14-22 @ 05:16)  SpO2: 98% (12-14-22 @ 05:16)      PHYSICAL EXAMINATION:  General: NAD  Head & Neck: atraumatic  Pulmonary: clear to ascultation  Cardiovascular: regular rate and rhythm  Gastrointestinal/Abdomen & Pelvis: soft, nontender, nondistended  Neurologic/Motor: non focal      DISCHARGE MEDICATION RECONCILIATION REVIEWED     DISPOSITION:   [  ] Home,    [  ] Home with Visiting Nursing Services,   [    ]  SNF/ NH,    [   ] Acute Rehab (4A),   [ X  ] Other (Specify:__[facility]_______)

## 2022-12-14 NOTE — DISCHARGE NOTE NURSING/CASE MANAGEMENT/SOCIAL WORK - NSDCPEFALRISK_GEN_ALL_CORE
For information on Fall & Injury Prevention, visit: [email or web address] OR  [email or web address] OR  https://www.cdc.gov/steadi/patient.html

## 2022-12-16 LAB
CULTURE RESULTS: SIGNIFICANT CHANGE UP
CULTURE RESULTS: SIGNIFICANT CHANGE UP
SPECIMEN SOURCE: SIGNIFICANT CHANGE UP
SPECIMEN SOURCE: SIGNIFICANT CHANGE UP

## 2022-12-20 DIAGNOSIS — K59.09 OTHER CONSTIPATION: ICD-10-CM

## 2022-12-20 DIAGNOSIS — Z87.891 PERSONAL HISTORY OF NICOTINE DEPENDENCE: ICD-10-CM

## 2022-12-20 DIAGNOSIS — U07.1 COVID-19: ICD-10-CM

## 2022-12-20 DIAGNOSIS — A41.9 SEPSIS, UNSPECIFIED ORGANISM: ICD-10-CM

## 2022-12-20 DIAGNOSIS — Z88.0 ALLERGY STATUS TO PENICILLIN: ICD-10-CM

## 2022-12-20 DIAGNOSIS — J18.9 PNEUMONIA, UNSPECIFIED ORGANISM: ICD-10-CM

## 2022-12-20 DIAGNOSIS — J43.9 EMPHYSEMA, UNSPECIFIED: ICD-10-CM

## 2022-12-20 DIAGNOSIS — Z91.012 ALLERGY TO EGGS: ICD-10-CM

## 2022-12-20 DIAGNOSIS — K21.9 GASTRO-ESOPHAGEAL REFLUX DISEASE WITHOUT ESOPHAGITIS: ICD-10-CM

## 2022-12-20 DIAGNOSIS — J96.01 ACUTE RESPIRATORY FAILURE WITH HYPOXIA: ICD-10-CM

## 2022-12-20 DIAGNOSIS — Z91.013 ALLERGY TO SEAFOOD: ICD-10-CM

## 2022-12-20 DIAGNOSIS — Z86.16 PERSONAL HISTORY OF COVID-19: ICD-10-CM

## 2022-12-20 DIAGNOSIS — J69.0 PNEUMONITIS DUE TO INHALATION OF FOOD AND VOMIT: ICD-10-CM

## 2022-12-20 DIAGNOSIS — E78.5 HYPERLIPIDEMIA, UNSPECIFIED: ICD-10-CM

## 2022-12-20 DIAGNOSIS — F20.9 SCHIZOPHRENIA, UNSPECIFIED: ICD-10-CM

## 2023-01-19 NOTE — PROGRESS NOTE BEHAVIORAL HEALTH - SUMMARY
Physical Therapy    Visit Type: initial evaluation  Co-treat with: Occupational therapist  Precautions:  Medical precautions: 64-year-old male with history of hypertension, found with abdominal aortic aneurysm  Lines:       Lines in chart and on patient reviewed, precautions maintained throughout session.  Safety Measures: bed alarm and chair alarm  SUBJECTIVE  Patient agreed to participate in therapy this date.  Pt reports his neighbor does the shopping   Patient / Family Goal: maximize function    Pain     Location: incision pain -not rated      OBJECTIVE     Cognitive Status   Orientation    - Oriented to: person, place, time and situation  Functional Communication   - Overall Status: within functional limits  Following Direction   - follows all commands and directions consistently    Vitals:  Blood Pressure (mmhg): 131/79  Pulse Ox (%): 97  Heart Rate (beats per minute): 102  Monitored throughout the session      Range of Motion (ROM)   (degrees unless noted; active unless noted; norms in ( ); negative=lacking to 0, positive=beyond 0)  WNL: LLE, RLE      Sitting Balance  (SHADIA = base of support)  Static      - Trial 1 details: minimal assist and moderate assist  Pt with posterior leaning, has difficulty with anterior weight shifting     Standing Balance  (SHADIA = base of support)  Firm Surface: Double Leg      - Static, Eyes Open       - Trial 1 details: total assist and with double UE support  Pt in standing requires tot A x2 to recover from buckling of B knees        Bed Mobility  - Supine to sit: maximal assist, with tactile cues, with verbal cues  Transfers  Assistive devices: 2 person, hand hold  - Sit to stand: 2 persons, maximal assist, with tactile cues, with verbal cues  - Stand to sit: 2 persons, maximal assist, with tactile cues, with verbal cues  - Stand pivot: total assist - non-dependent, 2 persons  Requires B knees blocked, has multiples multiple buckling epsidoes requiring tot A to recover.         Interventions     Training provided: safety training and neuromuscular reeducation  Discuss the possible need of rehab. Pt reports understanding   Skilled input: Verbal instruction/cues  Verbal Consent: Writer verbally educated and received verbal consent for hand placement, positioning of patient, and techniques to be performed today from patient for clothing adjustments for techniques as described above and how they are pertinent to the patient's plan of care.         ASSESSMENT   Impairments: balance, strength, activity tolerance, range of motion, shortness of breath, aerobic capacity, cardiovascular endurance and pain  Functional Limitations: all functional mobility  Recommended Consults: PT: Physical Medicine and Rehabilitation Physician    Discharge Recommendations  Recommendation for Discharge: PT IL: Patient is appropriate for intensive daily Physical Therapy with the oversight of a Physical Medicine and Rehabilitation physician  PT Identified Barriers to Discharge: BLE weakness, pain, decrease social support     • Skilled therapy is required to address these limitations in attempt to maximize the patient's independence.     • Predicted patient presentation: Low (stable) - Patient comorbidities and complexities, as defined above, will have little effect on progress for prescribed plan of care.    Education:   - Present and ready to learn: patient  Education provided during session:  - Results of above outlined education: Verbalizes understanding    Patient at End of Session:   Location: in chair  Safety measures: alarm system in place/re-engaged, equipment intact and lines intact  Handoff to: nurse    PLAN   Suggestions for next session as indicated: PT Frequency: 6-7 x per week  Frequency Comments: THUR 1/6-7x LS 1/19 PMR sc    Interventions: balance, gait training, neuromuscular re-education, ROM, strengthening, safety education, patient/family training, bed mobility, endurance training and functional  transfer training  Agreement to plan and goals: patient agrees with goals and treatment plan        GOALS  Review Date: 3/2/2023  Long Term Goals: (to be met by time of discharge from hospital)  Sit to supine: Patient will complete sit to supine independent.  Supine to sit: Patient will complete supine to sit independent.  Sit to stand: Patient will complete sit to stand transfer with least restrictive device, modified independent.   Stand to sit: Patient will complete stand to sit transfer with least restrictive device, modified independent.   Ambulation (even): Patient will ambulate on even surface for 150 feet with least restrictive device, modified independent.     Documented in the chart in the following areas: Prior Level of Function. Assessment.      Therapy procedure time and total treatment time can be found documented on the Time Entry flowsheet   63 year old female , domiciled in nursing home, with history of COPD, DVT, bipolar, and dementia, multiple prior inpatient psych, was at [facility] for 2 years, recently discharged from inpatient psych [facility] transferred to [facility], sent to the ER for continuous medication non-compliant for the past months, worsening psychosis and agitation.    Patient presents with decompensated psychiatric symptoms, secondary to medication non-compliance. Disorganized behavior noted and underlying psychosis appears to be impairing patient's judgment and behavior. Patient appears to be a danger to herself at this time due to patient unable to care for herself at this time. Patient would benefit form IPP for safety and stabilization.    Thoughts continue to be disorganized, illogical. Patient continues to present with paranoid delusions.  Continues to present with poor insight and judgement.  Denies depression and anxiety. Denies suicidal/homicidal ideations. Denies A/V hallucinations.    #Admit 2PC (9:27)    Plan:    #Schizophrenia  -Haldol 1mg BID    #Tobacco Use Disorder  -Nicotine Gum PRN    -Haldol 2mg Q8 PRN for Severe agitation  -Lorazepam 1mg Q12 PRN for agitation  -Hydroxyzine 25mg Q12 PRN for anxiety    -Tylenol PRN for pain  -Pantoprazole for GERD  -Maalox PRN for dyspepsia    -cw 1:1 elopement risk

## 2023-04-16 NOTE — ED BEHAVIORAL HEALTH ASSESSMENT NOTE - ESTIMATED INTELLIGENCE
Nutrition Assessment   Reason for Consult/Assessment: Initial, Wound consult,      Diagnosis and Hx: Reviewed     Pertinent Nutrition History: Met with pt- sleeping in chair upon entering room, reports appetite / po intake good PTA, very vague regarding intake states depends on what were having    Pertinent Nutrition Information: labs blood elevated- A1c  medication: reviewed                                 Diet Order: Regular                  Diet tolerance: c/o decreased appetite states not hungry, consumed 50% lunch 4/15- and hasn't eaten breakfast yet this am, states again not hungry, LBM (PTA)    Food Allergies: Yes (lactose intolerant)    Demographic/Anthropometrics Information  Gender: female  Patient Age: 59 year old  Height:   Ht Readings from Last 1 Encounters:   04/15/23 4' 6\" (1.372 m)      Weight:   Wt Readings from Last 1 Encounters:   04/15/23 69.1 kg      BMI:   BMI Readings from Last 1 Encounters:   04/15/23 36.73 kg/m²       Usual Weight:  (hx 68.2 kg (11/8/22) 71.3 kg (7/29/22))  % Weight Change: stable from wt hx  Weight change significant: No        Estimated Needs:  Calculated Energy Needs: 0834-1562  kcal               Calculated protein needs: 64-86  g    Calculated Fluid Needs: 1ml/kcal              NFPE  Nutrition Focused Physical Exam  Physical Exam Completed: Yes (partial visual -)  Body Fat  Overall Body Fat: Normal  Muscle Mass  Overall Muscle Mass: Normal  Skin Assessment/Wounds  Wounds Present: Wounds present (left buttock wound)             TREATMENT PLAN: Monitoring & Interventions   Intervention: Meals and snacks, Nutrition education, Nutrition supplement therapy            Meals & snacks:   currently on regular diet-   Recommend: change diet to moderate consistent CHO diet to help optimize blood sugar control for healing . Stressed need for adequate nutrition with emphasis on 3 well balanced meals per day along with good sources of protein rich foods at each meal (reviewed sources  with patient)                                                              Nutrition supplement therapy: will initiate Ensure max 1 x day per patient agreement    Nutrition education: will add nutrition for wound healing information and basic diabetic diet guidelines for home to [facility]. Recommend: Referral to outpatient dietitian for comprehensive diabetes diet education.    Goal: Increase oral intake to >/equal 75% of meals and supplements   Intervention goal status: Initiated  Time frame to achieve goal: Ongoing     Dietitian will monitor: Biochemical data, medical tests, procedures, Food, beverage, and nutrient intake            Nutrition Diagnosis / PES  Nutrition Diagnosis: Inadequate oral intake  Related to: Increased nutrition demands s/p surgery, Increased nutritional demands for healing, decreased appetite  As evidenced by: patient consuming 0-50% meals so far        Primary Nutrition Diagnosis status: New nutrition diagnosis                  Average

## 2023-08-03 NOTE — DISCHARGE NOTE BEHAVIORAL HEALTH - PHYSICIAN SECTION COMPLETE
Yes Posterior Auricular Interpolation Flap Text: Due to geometric and functional constraints, a flap reconstruction was performed to reconstruct the defect. To that end, adjacent tissue was incised and carried over to close the defect in the following manner: A decision was made to reconstruct the defect utilizing an interpolation axial flap and a staged reconstruction.  A telfa template was made of the defect.  This telfa template was then used to outline the posterior auricular interpolation flap.  The donor area for the pedicle flap was then injected with anesthesia.  The flap was excised through the skin and subcutaneous tissue down to the layer of the underlying musculature.  The pedicle flap was carefully excised within this deep plane to maintain its blood supply.  The edges of the donor site were undermined.   The donor site was closed in a primary fashion.  The pedicle was then rotated into position and sutured.  Once the tube was sutured into place, adequate blood supply was confirmed with blanching and refill.  The pedicle was then wrapped with xeroform gauze and dressed appropriately with a telfa and gauze bandage to ensure continued blood supply and protect the attached pedicle.

## 2024-01-26 NOTE — BH CONSULTATION LIAISON ASSESSMENT NOTE - NSBHCONSULTMEDAGITATION_PSY_A_CORE FT
no
denies pain/discomfort (Rating = 0)
Recommend Haldol 1-2 mg P.O Q 6 hrs PRN for agitation. Please note that this medication can be given via the intramuscular route if patient is severely agitated and is considered a danger to herself or others. Please ensure that QTC is < 500

## 2024-03-14 NOTE — ED ADULT NURSE NOTE - CAS DISCH BELONGINGS RETURNED
RT Inhaler-Nebulizer Bronchodilator Protocol Note    There is a bronchodilator order in the chart from a provider indicating to follow the RT Bronchodilator Protocol and there is an “Initiate RT Inhaler-Nebulizer Bronchodilator Protocol” order as well (see protocol at bottom of note).    CXR Findings:  XR CHEST PORTABLE    Result Date: 3/14/2024  Impression: 1. No acute cardiopulmonary process. This document has been electronically signed by: Asia Lowery DO on 03/14/2024 02:51 AM      The findings from the last RT Protocol Assessment were as follows:   History Pulmonary Disease: Chronic pulmonary disease  Respiratory Pattern: Regular pattern and RR 12-20 bpm  Breath Sounds: Intermittent or unilateral wheezes  Cough: Strong, spontaneous, non-productive  Indication for Bronchodilator Therapy: Wheezing associated with pulm disorder  Bronchodilator Assessment Score: 6    Aerosolized bronchodilator medication orders have been revised according to the RT Inhaler-Nebulizer Bronchodilator Protocol below.    Respiratory Therapist to perform RT Therapy Protocol Assessment initially then follow the protocol.  Repeat RT Therapy Protocol Assessment PRN for score 0-3 or on second treatment, BID, and PRN for scores above 3.    No Indications - adjust the frequency to every 6 hours PRN wheezing or bronchospasm, if no treatments needed after 48 hours then discontinue using Per Protocol order mode.     If indication present, adjust the RT bronchodilator orders based on the Bronchodilator Assessment Score as indicated below.  Use Inhaler orders unless patient has one or more of the following: on home nebulizer, not able to hold breath for 10 seconds, is not alert and oriented, cannot activate and use MDI correctly, or respiratory rate 25 breaths per minute or more, then use the equivalent nebulizer order(s) with same Frequency and PRN reasons based on the score.  If a patient is on this medication at home then do not decrease  Yes

## 2024-03-20 NOTE — ED PROVIDER NOTE - NSICDXPASTMEDICALHX_GEN_ALL_CORE_FT
Pt to clinic for hydration. Pt offers no complaints today. Tolerated infusion without complications. Pt port accessed, flushed, and de-accessed with positive blood returned. AVS was offered, pt declined. Pt aware of next appt on 03/22 at 10:30 am . Pt ambulated out of clinic safely.   PAST MEDICAL HISTORY:  Chronic GERD     Chronic obstructive lung disease     Schizophrenia

## 2024-04-16 NOTE — PHYSICAL THERAPY INITIAL EVALUATION ADULT - LEVEL OF INDEPENDENCE: GAIT, REHAB EVAL
Left Voicemail (1st Attempt) for the patient to call back and schedule the following:    Appointment type: [facility] ENT  Provider: Douglas  Return date: 6 weeks from 4/19  Specialty phone number: [phone]  Additional appointment(s) needed:   Additonal Notes: Patient needs 6 week follow up from 4/19 after treatment with Dr. Cole.      supervision

## 2024-09-28 NOTE — PROGRESS NOTE BEHAVIORAL HEALTH - NSBHFUPINTERVALHXFT_PSY_A_CORE
Patient seen and evaluated, chart reviewed. As per nursing report patient continues to be intermittently irritable, agitated, disruptive, verbally and physically aggressive toward staff requiring PRN'S. This weekend said to be have been scanning the door when staff were exciting the floor. One of the PCA's stood in front of her to prevent her from running out the door and patient kicked the PCA. Patient was due for her IM Haldol 2mg however IM Ativan 2mg was ordered for agitation as an adjunct. Medication was given to good effect. Periodically refusing vitals, labs and EKG. 1:1 in place for safety/elopement risk. Reluctantly takes po meds with encouragement. TOO in place to ensure medication compliance. On evaluation patient calm and cooperative stating she feels fine and asking when can she go home. States she wants to be discharged as soon as possible. Patient continues to have impaired concentration. Thoughts continue to be disorganized and illogical. Patient continues to present with paranoid delusions. Continues to present with poor insight and judgement. Denies depression and anxiety. Denies suicidal/homicidal ideations. Denies A/V hallucinations.     Haldol to be increased. EKG ordered for AM. No Patient seen and evaluated, chart reviewed. As per nursing report patient continues to be intermittently irritable, agitated, disruptive, verbally and physically aggressive toward staff requiring PRN'S. This weekend said to be have been scanning the door when staff were exciting the floor. One of the PCA's stood in front of her to prevent her from running out the door and patient kicked the PCA. Patient was due for her IM Haldol 2mg however IM Ativan 2mg was ordered for agitation as an adjunct. Medication was given to good effect. Periodically refusing vitals, labs and EKG. Reluctantly takes po meds with encouragement. TOO in place to ensure medication compliance. On evaluation patient calm and cooperative stating she feels fine and asking when can she go home. States she wants to be discharged as soon as possible. Patient continues to have impaired concentration. Thoughts continue to be disorganized and illogical. Patient continues to present with paranoid delusions. Continues to present with poor insight and judgement. Denies depression and anxiety. Denies suicidal/homicidal ideations. Denies A/V hallucinations.     Haldol to be increased. EKG ordered for AM.

## 2024-10-07 NOTE — PROGRESS NOTE BEHAVIORAL HEALTH - THOUGHT PROCESS
The patient is a 67y Female complaining of see chief complaint quote. Disorganized/Illogical/Impaired reasoning/Other Disorganized/Illogical/Impaired reasoning
